# Patient Record
Sex: FEMALE | Race: WHITE | NOT HISPANIC OR LATINO | Employment: FULL TIME | URBAN - METROPOLITAN AREA
[De-identification: names, ages, dates, MRNs, and addresses within clinical notes are randomized per-mention and may not be internally consistent; named-entity substitution may affect disease eponyms.]

---

## 2017-06-10 ENCOUNTER — OFFICE VISIT (OUTPATIENT)
Dept: URGENT CARE | Facility: CLINIC | Age: 47
End: 2017-06-10
Payer: COMMERCIAL

## 2017-06-10 PROCEDURE — 99203 OFFICE O/P NEW LOW 30 MIN: CPT

## 2017-08-23 ENCOUNTER — ALLSCRIPTS OFFICE VISIT (OUTPATIENT)
Dept: OTHER | Facility: OTHER | Age: 47
End: 2017-08-23

## 2017-08-23 DIAGNOSIS — R63.5 ABNORMAL WEIGHT GAIN: ICD-10-CM

## 2017-08-23 DIAGNOSIS — E66.3 OVERWEIGHT(278.02): ICD-10-CM

## 2017-08-23 DIAGNOSIS — Z13.220 ENCOUNTER FOR SCREENING FOR LIPOID DISORDERS: ICD-10-CM

## 2018-01-12 NOTE — CONSULTS
Chief Complaint  Chief Complaint Free Text Note Form: New patient here with daughter for MWM consult; Gelacio Jimenes Poplin 1/8; patient reports of B/L ankle edema that radiates up into knees, states she's had multiple studies done with never any real sense of why the edema, now wonders if it could be a dietary issue  History of Present Illness  Free Text HPI: Obesity-  Severity: Mild  Onset: a few years ago and then slowly increased since then  Modifiers: Busy with her daughter  Has tried juicing, but was unable to sustain  Tried exercising for 2 years with a  with portion control, but had an abnormal mammogram and wasn't able to keep up with the lifestyle changes  Low carb/ketogenic diets with minimal results  1 cup decaf coffee and unsweetened ice tea, otherwise exclusively water  Associated Symptoms: self image, feels tired  Goals: improve health, feel more comfortable in her own skin, feel better in clothes, more energy  Past Medical History    1  History of Denial Of Any Significant Medical History   2  History of allergy (V15 09) (Z88 9)   3  History of contact dermatitis (V13 3) (Z87 2)   4  History of Skin rash (782 1) (R21)  Active Problems And Past Medical History Reviewed: The active problems and past medical history were reviewed and updated today  Assessment    1  Abnormal weight gain (783 1) (R63 5)   2  Overweight (BMI 25 0-29 9) (278 02) (E66 3)   3  Screening for hyperlipidemia (V77 91) (Z13 220)    Plan   1  (1) COMPREHENSIVE METABOLIC PANEL; Status:Active; Requested for:[id number];    2  (1) INSULIN, FASTING; Status:Active; Requested for:[id number];    3  (1) LIPID PANEL, FASTING; Status:Active; Requested for:[id number];     Discussion/Summary  Discussion Summary:   51 yo with w/ weight gain here to pursue medical weight management to improve weight and health      Overweight/Weight Gain:  -discussed options of conservative vs QI program +/- meal replacement  -discussed role of weight loss medications  -initial focus of 5-10% weight loss over 3-6 mos for improved health  -screening labs: Per patient were done about 6 months ago, but did not include LP  Was told by her PCP that labs were fine  LP, CMP, and fasting insulin ordered  Will request labs done by PCP  The patient is interested in VLCD or IL program  She will call when she is ready to schedule  Goals and Barriers: The patient has the current Goals: Goals:  1  Food log www  [employer] <[email or web address]>  2  No sugary beverages  At least 64oz of water daily  3  Increase physical activity by 10 minutes daily  Patient's Capacity to Self-Care: Patient is able to Self-Care  Understands and agrees with treatment plan: The treatment plan was reviewed with the patient/guardian  The patient/guardian understands and agrees with the treatment plan   Self Referrals:   Self Referrals: Yes Self Referred      Review of Systems  Focused-Female:   Constitutional: no fever and no chills  ENT: no sore throat  Cardiovascular: no chest pain    The patient presents with complaints of palpitations (infrequent)  Respiratory: no shortness of breath and no cough  Gastrointestinal: Denies GERD, but no abdominal pain, no nausea, no vomiting, no constipation and no diarrhea  Genitourinary: no dysuria  Musculoskeletal: no arthralgias  Integumentary: no rashes  The patient presents with complaints of headache (sinus headaches)  Other Symptoms: PSYCH: denies sx of depression and anxiety  ROS Reviewed:   ROS reviewed  Active Problems    1  Acute sinusitis (461 9) (J01 90)   2  Seasonal allergies (477 9) (J30 2)    Surgical History    1  History of Incisional Breast Biopsy   2  History of Oral Surgery Tooth Extraction [place] Tooth  Surgical History Reviewed: The surgical history was reviewed and updated today  Family History  Mother    1   Family history of malignant neoplasm of breast (V16 3) (Z80 3)  Paternal Grandmother    2  Family history of myocardial infarction (V17 3) (Z82 49)  Maternal Aunt    3  Family history of malignant neoplasm of breast (V16 3) (Z80 3)  Family History    4  Family history of Cancer  Family History Reviewed: The family history was reviewed and updated today  Social History    · Never A Smoker  Social History Reviewed: The social history was reviewed and updated today  Current Meds   1  Allegra Allergy 180 MG Oral Tablet; Therapy: (Lily Paige) to Recorded  Medication List Reviewed: The medication list was reviewed and updated today  Allergies    1  Sulfa Drugs    Vitals  Vital Signs    Recorded: 23Aug2017 01:08PM Recorded: 23Aug2017 12:49PM   Temperature 98 4 F 99 F   Heart Rate  72   Respiration  16   Systolic  606   Diastolic  82   Height  5 ft 3 5 in   Weight  164 lb 9 6 oz   BMI Calculated  28 7   BSA Calculated  1 79   Waist Circumference  34   Neck Circumference  13     Physical Exam    Constitutional   General appearance: Abnormal   well developed and overweight  Pulmonary   Respiratory effort: No increased work of breathing or signs of respiratory distress  Auscultation of lungs: Clear to auscultation  Cardiovascular   Auscultation of heart: Normal rate and rhythm, normal S1 and S2, without murmurs  Examination of extremities for edema and/or varicosities: Abnormal   bilateral ankle trace+ pitting edema  Abdomen   Abdomen: Non-tender, no masses  Musculoskeletal   Gait and station: Normal     Skin normal to inspection overall  Psychiatric tearful when discussing weight, otherwise affect was appropriate          Results/Data  STOP BANG Questionnaire [id number] 12:58PM User, Guilherme     Test Name Result Flag Reference   STOP BANG Questionnaire Risk of KIMMIE Low Risk     Snoring: No  Tired: Yes  Observed: No  Blood Pressure: No  BMI: No  Age: No  Neck Circumference: No  Gender: No   STOP BANG Questionnaire KIMMIE Total Score 1 Snoring: No  Tired: Yes  Observed: No  Blood Pressure: No  BMI: No  Age: No  Neck Circumference: No  Gender: No       Signatures   Electronically signed by : RAO Kwok;  Aug 23 2017  1:45PM EST                       (Author)    Electronically signed by : JIMMY Dorman ; Aug 23 2017  2:15PM EST                       (Co-author)

## 2018-01-14 VITALS
BODY MASS INDEX: 28.1 KG/M2 | HEART RATE: 72 BPM | DIASTOLIC BLOOD PRESSURE: 82 MMHG | WEIGHT: 164.6 LBS | RESPIRATION RATE: 16 BRPM | TEMPERATURE: 98.4 F | HEIGHT: 64 IN | SYSTOLIC BLOOD PRESSURE: 124 MMHG

## 2018-04-26 ENCOUNTER — OFFICE VISIT (OUTPATIENT)
Dept: URGENT CARE | Facility: CLINIC | Age: 48
End: 2018-04-26
Payer: COMMERCIAL

## 2018-04-26 VITALS
DIASTOLIC BLOOD PRESSURE: 82 MMHG | HEIGHT: 63 IN | WEIGHT: 150 LBS | OXYGEN SATURATION: 99 % | RESPIRATION RATE: 16 BRPM | BODY MASS INDEX: 26.58 KG/M2 | TEMPERATURE: 99.1 F | HEART RATE: 72 BPM | SYSTOLIC BLOOD PRESSURE: 132 MMHG

## 2018-04-26 DIAGNOSIS — J30.89 SEASONAL ALLERGIC RHINITIS DUE TO OTHER ALLERGIC TRIGGER: ICD-10-CM

## 2018-04-26 DIAGNOSIS — H10.33 ACUTE BACTERIAL CONJUNCTIVITIS OF BOTH EYES: Primary | ICD-10-CM

## 2018-04-26 PROCEDURE — 99213 OFFICE O/P EST LOW 20 MIN: CPT | Performed by: PHYSICIAN ASSISTANT

## 2018-04-26 RX ORDER — TOBRAMYCIN 3 MG/ML
2 SOLUTION/ DROPS OPHTHALMIC
Qty: 1 BOTTLE | Refills: 0 | Status: SHIPPED | OUTPATIENT
Start: 2018-04-26 | End: 2018-05-01

## 2018-04-26 NOTE — PATIENT INSTRUCTIONS
Wash hands frequently to prevent the spread of infection  Allergy medication as needed  Use eye drops as directed

## 2018-04-26 NOTE — PROGRESS NOTES
[facility]    NAME: Andrae Elder is a 50 y o  female  : 1970    MRN: [id number]  DATE: 2018  TIME: 12:35 PM    Assessment and Plan   Acute bacterial conjunctivitis of both eyes [H10 33]  1  Acute bacterial conjunctivitis of both eyes  tobramycin (TOBREX) 0 3 % SOLN   2  Seasonal allergic rhinitis due to other allergic trigger         Patient Instructions     Patient Instructions   Wash hands frequently to prevent the spread of infection  Allergy medication as needed  Use eye drops as directed  Chief Complaint     Chief Complaint   Patient presents with    Eye Pain     C/O redness and puralent drainage from right eye this AM  Pt denies any pain in the eye  Pt does wear contacts  History of Present Illness   24-year-old female here with complaint of right eye irritation, purulent drainage and redness that started today  Patient notices symptoms when she 1st woke up  Denies any pain in her eye  No vision changes  She has had some allergy symptoms the last week will with some nasal congestion, slight sore throat  Otherwise denies any other upper respiratory complaints  Review of Systems   Review of Systems   Constitutional: Negative for activity change, appetite change, chills, diaphoresis, fatigue, fever and unexpected weight change  HENT: Positive for congestion and sore throat  Negative for dental problem, hearing loss, sinus pressure, sneezing, tinnitus, trouble swallowing and voice change  Eyes: Positive for discharge and redness  Negative for photophobia and visual disturbance  Respiratory: Negative for apnea, cough, chest tightness, shortness of breath, wheezing and stridor  Cardiovascular: Negative for chest pain, palpitations and leg swelling  Gastrointestinal: Negative for abdominal distention, abdominal pain, blood in stool, constipation, diarrhea, nausea and vomiting     Endocrine: Negative for cold intolerance, heat intolerance, polydipsia, polyphagia and polyuria  Genitourinary: Negative for difficulty urinating, dysuria, flank pain, frequency, hematuria and urgency  Musculoskeletal: Negative for arthralgias, back pain, gait problem, joint swelling, myalgias, neck pain and neck stiffness  Skin: Negative for pallor, rash and wound  Neurological: Negative for dizziness, tremors, seizures, speech difficulty, weakness and headaches  Hematological: Negative for adenopathy  Does not bruise/bleed easily  Psychiatric/Behavioral: Negative for agitation, confusion, dysphoric mood and sleep disturbance  The patient is not nervous/anxious  All other systems reviewed and are negative  Current Medications     Current Outpatient Prescriptions:     tobramycin (TOBREX) 0 3 % SOLN, Administer 2 drops to both eyes every 4 (four) hours while awake for 5 days, Disp: 1 Bottle, Rfl: 0    Current Allergies     Allergies as of 04/26/2018 - Reviewed 04/26/2018   Allergen Reaction Noted    Sulfa antibiotics Rash 12/05/2013          The following portions of the patient's history were reviewed and updated as appropriate: allergies, current medications, past family history, past medical history, past social history, past surgical history and problem list    Past Medical History:   Diagnosis Date    Allergic rhinitis      Past Surgical History:   Procedure Laterality Date    BREAST BIOPSY       No family history on file  Medications have been verified  Objective   /82   Pulse 72   Temp 99 1 °F (37 3 °C) (Tympanic)   Resp 16   Ht 5' 3" (1 6 m)   Wt 68 kg (150 lb)   SpO2 99%   BMI 26 57 kg/m²      Physical Exam   Physical Exam   Constitutional: She appears well-developed and well-nourished  No distress  HENT:   Head: Normocephalic  Right Ear: Tympanic membrane and external ear normal    Left Ear: Tympanic membrane and external ear normal    Nose: Mucosal edema present   Right sinus exhibits no maxillary sinus tenderness and no frontal sinus tenderness  Left sinus exhibits no maxillary sinus tenderness and no frontal sinus tenderness  Mouth/Throat: Posterior oropharyngeal erythema (Mild) present  No oropharyngeal exudate  Eyes: Right eye exhibits discharge  Left eye exhibits no discharge  Right conjunctiva is injected  Left conjunctiva is injected  Neck: Normal range of motion  Neck supple  Cardiovascular: Normal rate, regular rhythm and normal heart sounds  No murmur heard  Pulmonary/Chest: Effort normal and breath sounds normal  No respiratory distress  She has no wheezes  She has no rales  Abdominal: Soft  Bowel sounds are normal  There is no tenderness  Musculoskeletal: Normal range of motion  Lymphadenopathy:     She has no cervical adenopathy  Skin: Skin is warm  No rash noted

## 2018-09-14 ENCOUNTER — APPOINTMENT (OUTPATIENT)
Dept: LAB | Facility: CLINIC | Age: 48
End: 2018-09-14
Payer: COMMERCIAL

## 2018-09-14 ENCOUNTER — TRANSCRIBE ORDERS (OUTPATIENT)
Dept: LAB | Facility: CLINIC | Age: 48
End: 2018-09-14

## 2018-09-14 DIAGNOSIS — R53.83 FATIGUE, UNSPECIFIED TYPE: Primary | ICD-10-CM

## 2018-09-14 DIAGNOSIS — R60.9 EDEMA, UNSPECIFIED TYPE: ICD-10-CM

## 2018-09-14 LAB
25(OH)D3 SERPL-MCNC: 18.5 NG/ML (ref 30–100)
ALBUMIN SERPL BCP-MCNC: 3.6 G/DL (ref 3.5–5)
ALP SERPL-CCNC: 66 U/L (ref 46–116)
ALT SERPL W P-5'-P-CCNC: 18 U/L (ref 12–78)
ANION GAP SERPL CALCULATED.3IONS-SCNC: 7 MMOL/L (ref 4–13)
AST SERPL W P-5'-P-CCNC: 13 U/L (ref 5–45)
BACTERIA UR QL AUTO: ABNORMAL /HPF
BASOPHILS # BLD AUTO: 0.06 THOUSANDS/ΜL (ref 0–0.1)
BASOPHILS NFR BLD AUTO: 1 % (ref 0–1)
BILIRUB SERPL-MCNC: 0.62 MG/DL (ref 0.2–1)
BILIRUB UR QL STRIP: NEGATIVE
BUN SERPL-MCNC: 12 MG/DL (ref 5–25)
CALCIUM SERPL-MCNC: 8.6 MG/DL (ref 8.3–10.1)
CHLORIDE SERPL-SCNC: 108 MMOL/L (ref 100–108)
CHLORIDE UR-SCNC: 198 MMOL/L (ref 10–330)
CHOLEST SERPL-MCNC: 207 MG/DL (ref 50–200)
CLARITY UR: CLEAR
CO2 SERPL-SCNC: 24 MMOL/L (ref 21–32)
COLOR UR: YELLOW
CREAT SERPL-MCNC: 0.94 MG/DL (ref 0.6–1.3)
EOSINOPHIL # BLD AUTO: 0.1 THOUSAND/ΜL (ref 0–0.61)
EOSINOPHIL NFR BLD AUTO: 2 % (ref 0–6)
ERYTHROCYTE [DISTWIDTH] IN BLOOD BY AUTOMATED COUNT: 13.7 % (ref 11.6–15.1)
GFR SERPL CREATININE-BSD FRML MDRD: 71 ML/MIN/1.73SQ M
GLUCOSE P FAST SERPL-MCNC: 89 MG/DL (ref 65–99)
GLUCOSE UR STRIP-MCNC: NEGATIVE MG/DL
HCT VFR BLD AUTO: 46 % (ref 34.8–46.1)
HDLC SERPL-MCNC: 63 MG/DL (ref 40–60)
HGB BLD-MCNC: 14.8 G/DL (ref 11.5–15.4)
HGB UR QL STRIP.AUTO: ABNORMAL
HYALINE CASTS #/AREA URNS LPF: ABNORMAL /LPF
IMM GRANULOCYTES # BLD AUTO: 0.01 THOUSAND/UL (ref 0–0.2)
IMM GRANULOCYTES NFR BLD AUTO: 0 % (ref 0–2)
KETONES UR STRIP-MCNC: NEGATIVE MG/DL
LDLC SERPL CALC-MCNC: 124 MG/DL (ref 0–100)
LEUKOCYTE ESTERASE UR QL STRIP: NEGATIVE
LYMPHOCYTES # BLD AUTO: 1.44 THOUSANDS/ΜL (ref 0.6–4.47)
LYMPHOCYTES NFR BLD AUTO: 24 % (ref 14–44)
MCH RBC QN AUTO: 27.8 PG (ref 26.8–34.3)
MCHC RBC AUTO-ENTMCNC: 32.2 G/DL (ref 31.4–37.4)
MCV RBC AUTO: 86 FL (ref 82–98)
MONOCYTES # BLD AUTO: 0.44 THOUSAND/ΜL (ref 0.17–1.22)
MONOCYTES NFR BLD AUTO: 7 % (ref 4–12)
NEUTROPHILS # BLD AUTO: 3.98 THOUSANDS/ΜL (ref 1.85–7.62)
NEUTS SEG NFR BLD AUTO: 66 % (ref 43–75)
NITRITE UR QL STRIP: NEGATIVE
NON-SQ EPI CELLS URNS QL MICRO: ABNORMAL /HPF
NONHDLC SERPL-MCNC: 144 MG/DL
NRBC BLD AUTO-RTO: 0 /100 WBCS
PH UR STRIP.AUTO: 6 [PH] (ref 4.5–8)
PLATELET # BLD AUTO: 298 THOUSANDS/UL (ref 149–390)
PMV BLD AUTO: 10.3 FL (ref 8.9–12.7)
POTASSIUM SERPL-SCNC: 4.4 MMOL/L (ref 3.5–5.3)
POTASSIUM UR-SCNC: 99.8 MMOL/L (ref 1–300)
PROT SERPL-MCNC: 7.5 G/DL (ref 6.4–8.2)
PROT UR STRIP-MCNC: ABNORMAL MG/DL
RBC # BLD AUTO: 5.33 MILLION/UL (ref 3.81–5.12)
RBC #/AREA URNS AUTO: ABNORMAL /HPF
SODIUM 24H UR-SCNC: 109 MOL/L
SODIUM SERPL-SCNC: 139 MMOL/L (ref 136–145)
SP GR UR STRIP.AUTO: 1.02 (ref 1–1.03)
T4 FREE SERPL-MCNC: 1.11 NG/DL (ref 0.76–1.46)
TRIGL SERPL-MCNC: 99 MG/DL
TSH SERPL DL<=0.05 MIU/L-ACNC: 2.46 UIU/ML (ref 0.36–3.74)
UROBILINOGEN UR QL STRIP.AUTO: 0.2 E.U./DL
WBC # BLD AUTO: 6.03 THOUSAND/UL (ref 4.31–10.16)
WBC #/AREA URNS AUTO: ABNORMAL /HPF

## 2018-09-14 PROCEDURE — 82436 ASSAY OF URINE CHLORIDE: CPT

## 2018-09-14 PROCEDURE — 84133 ASSAY OF URINE POTASSIUM: CPT

## 2018-09-14 PROCEDURE — 36415 COLL VENOUS BLD VENIPUNCTURE: CPT

## 2018-09-14 PROCEDURE — 80061 LIPID PANEL: CPT

## 2018-09-14 PROCEDURE — 82306 VITAMIN D 25 HYDROXY: CPT

## 2018-09-14 PROCEDURE — 84300 ASSAY OF URINE SODIUM: CPT

## 2018-09-14 PROCEDURE — 81001 URINALYSIS AUTO W/SCOPE: CPT

## 2018-09-14 PROCEDURE — 84443 ASSAY THYROID STIM HORMONE: CPT

## 2018-09-14 PROCEDURE — 80053 COMPREHEN METABOLIC PANEL: CPT

## 2018-09-14 PROCEDURE — 84439 ASSAY OF FREE THYROXINE: CPT

## 2018-09-14 PROCEDURE — 85025 COMPLETE CBC W/AUTO DIFF WBC: CPT

## 2018-09-24 ENCOUNTER — APPOINTMENT (OUTPATIENT)
Dept: LAB | Facility: CLINIC | Age: 48
End: 2018-09-24
Payer: COMMERCIAL

## 2018-09-24 ENCOUNTER — TRANSCRIBE ORDERS (OUTPATIENT)
Dept: URGENT CARE | Facility: CLINIC | Age: 48
End: 2018-09-24

## 2018-09-24 DIAGNOSIS — N39.0 URINARY TRACT INFECTION WITHOUT HEMATURIA, SITE UNSPECIFIED: Primary | ICD-10-CM

## 2018-09-24 LAB
AMORPH URATE CRY URNS QL MICRO: ABNORMAL /HPF
BACTERIA UR QL AUTO: ABNORMAL /HPF
BILIRUB UR QL STRIP: NEGATIVE
CLARITY UR: ABNORMAL
COLOR UR: ABNORMAL
GLUCOSE UR STRIP-MCNC: NEGATIVE MG/DL
HGB UR QL STRIP.AUTO: ABNORMAL
KETONES UR STRIP-MCNC: NEGATIVE MG/DL
LEUKOCYTE ESTERASE UR QL STRIP: NEGATIVE
NITRITE UR QL STRIP: NEGATIVE
NON-SQ EPI CELLS URNS QL MICRO: ABNORMAL /HPF
PH UR STRIP.AUTO: 6 [PH] (ref 4.5–8)
PROT UR STRIP-MCNC: ABNORMAL MG/DL
RBC #/AREA URNS AUTO: ABNORMAL /HPF
SP GR UR STRIP.AUTO: 1.03 (ref 1–1.03)
UROBILINOGEN UR QL STRIP.AUTO: 1 E.U./DL
WBC #/AREA URNS AUTO: ABNORMAL /HPF

## 2018-09-24 PROCEDURE — 81001 URINALYSIS AUTO W/SCOPE: CPT

## 2018-09-28 ENCOUNTER — TRANSCRIBE ORDERS (OUTPATIENT)
Dept: URGENT CARE | Facility: CLINIC | Age: 48
End: 2018-09-28

## 2018-09-28 ENCOUNTER — APPOINTMENT (OUTPATIENT)
Dept: LAB | Facility: CLINIC | Age: 48
End: 2018-09-28
Payer: COMMERCIAL

## 2018-09-28 DIAGNOSIS — R80.9 PROTEINURIA, UNSPECIFIED TYPE: Primary | ICD-10-CM

## 2018-09-28 DIAGNOSIS — R31.29 MICROSCOPIC HEMATURIA: ICD-10-CM

## 2018-09-28 LAB
CRP SERPL QL: <3 MG/L
ERYTHROCYTE [SEDIMENTATION RATE] IN BLOOD: 2 MM/HOUR (ref 0–20)
IGA SERPL-MCNC: 237 MG/DL (ref 70–400)
IGG SERPL-MCNC: 1100 MG/DL (ref 700–1600)
IGM SERPL-MCNC: 129 MG/DL (ref 40–230)

## 2018-09-28 PROCEDURE — 85652 RBC SED RATE AUTOMATED: CPT

## 2018-09-28 PROCEDURE — 84165 PROTEIN E-PHORESIS SERUM: CPT

## 2018-09-28 PROCEDURE — 84165 PROTEIN E-PHORESIS SERUM: CPT | Performed by: PATHOLOGY

## 2018-09-28 PROCEDURE — 86038 ANTINUCLEAR ANTIBODIES: CPT

## 2018-09-28 PROCEDURE — 86225 DNA ANTIBODY NATIVE: CPT

## 2018-09-28 PROCEDURE — 82784 ASSAY IGA/IGD/IGG/IGM EACH: CPT

## 2018-09-28 PROCEDURE — 86430 RHEUMATOID FACTOR TEST QUAL: CPT

## 2018-09-28 PROCEDURE — 84166 PROTEIN E-PHORESIS/URINE/CSF: CPT

## 2018-09-28 PROCEDURE — 36415 COLL VENOUS BLD VENIPUNCTURE: CPT

## 2018-09-28 PROCEDURE — 84166 PROTEIN E-PHORESIS/URINE/CSF: CPT | Performed by: PATHOLOGY

## 2018-09-28 PROCEDURE — 86140 C-REACTIVE PROTEIN: CPT

## 2018-09-29 LAB — DSDNA AB SER-ACNC: 1 IU/ML (ref 0–9)

## 2018-10-01 LAB
ALBUMIN SERPL ELPH-MCNC: 3.79 G/DL (ref 3.5–5)
ALBUMIN SERPL ELPH-MCNC: 56.5 % (ref 52–65)
ALBUMIN UR ELPH-MCNC: 68.9 %
ALPHA1 GLOB MFR UR ELPH: 4.5 %
ALPHA1 GLOB SERPL ELPH-MCNC: 0.27 G/DL (ref 0.1–0.4)
ALPHA1 GLOB SERPL ELPH-MCNC: 4.1 % (ref 2.5–5)
ALPHA2 GLOB MFR UR ELPH: 5.3 %
ALPHA2 GLOB SERPL ELPH-MCNC: 0.64 G/DL (ref 0.4–1.2)
ALPHA2 GLOB SERPL ELPH-MCNC: 9.5 % (ref 7–13)
B-GLOBULIN MFR UR ELPH: 7 %
BETA GLOB ABNORMAL SERPL ELPH-MCNC: 0.44 G/DL (ref 0.4–0.8)
BETA1 GLOB SERPL ELPH-MCNC: 6.6 % (ref 5–13)
BETA2 GLOB SERPL ELPH-MCNC: 5.2 % (ref 2–8)
BETA2+GAMMA GLOB SERPL ELPH-MCNC: 0.35 G/DL (ref 0.2–0.5)
GAMMA GLOB ABNORMAL SERPL ELPH-MCNC: 1.21 G/DL (ref 0.5–1.6)
GAMMA GLOB MFR UR ELPH: 14.3 %
GAMMA GLOB SERPL ELPH-MCNC: 18.1 % (ref 12–22)
IGG/ALB SER: 1.3 {RATIO} (ref 1.1–1.8)
PROT PATTERN SERPL ELPH-IMP: NORMAL
PROT PATTERN UR ELPH-IMP: ABNORMAL
PROT SERPL-MCNC: 6.7 G/DL (ref 6.4–8.2)
PROT UR-MCNC: 55 MG/DL
RHEUMATOID FACT SER QL LA: NEGATIVE
RYE IGE QN: NEGATIVE

## 2018-11-26 ENCOUNTER — TRANSCRIBE ORDERS (OUTPATIENT)
Dept: URGENT CARE | Facility: CLINIC | Age: 48
End: 2018-11-26

## 2018-11-26 ENCOUNTER — APPOINTMENT (OUTPATIENT)
Dept: LAB | Facility: CLINIC | Age: 48
End: 2018-11-26
Payer: COMMERCIAL

## 2018-11-26 DIAGNOSIS — R80.9 PROTEINURIA, UNSPECIFIED TYPE: Primary | ICD-10-CM

## 2018-11-26 PROCEDURE — 84156 ASSAY OF PROTEIN URINE: CPT

## 2018-11-27 LAB
PROT 24H UR-MCNC: 390 MG/24 HRS (ref 40–150)
SPECIMEN VOL UR: 1300 ML

## 2018-11-30 ENCOUNTER — TELEPHONE (OUTPATIENT)
Dept: INTERVENTIONAL RADIOLOGY/VASCULAR | Facility: HOSPITAL | Age: 48
End: 2018-11-30

## 2018-12-03 ENCOUNTER — TELEPHONE (OUTPATIENT)
Dept: INTERVENTIONAL RADIOLOGY/VASCULAR | Facility: HOSPITAL | Age: 48
End: 2018-12-03

## 2018-12-18 ENCOUNTER — TELEPHONE (OUTPATIENT)
Dept: SURGERY | Facility: HOSPITAL | Age: 48
End: 2018-12-18

## 2018-12-19 ENCOUNTER — HOSPITAL ENCOUNTER (OUTPATIENT)
Dept: INTERVENTIONAL RADIOLOGY/VASCULAR | Facility: HOSPITAL | Age: 48
Discharge: HOME | End: 2018-12-19
Attending: INTERNAL MEDICINE | Admitting: RADIOLOGY
Payer: COMMERCIAL

## 2018-12-19 VITALS
HEIGHT: 63 IN | OXYGEN SATURATION: 97 % | DIASTOLIC BLOOD PRESSURE: 88 MMHG | TEMPERATURE: 97.8 F | SYSTOLIC BLOOD PRESSURE: 136 MMHG | BODY MASS INDEX: 30.12 KG/M2 | RESPIRATION RATE: 18 BRPM | HEART RATE: 72 BPM | WEIGHT: 170 LBS

## 2018-12-19 DIAGNOSIS — N28.9 KIDNEY DISORDER: ICD-10-CM

## 2018-12-19 LAB — EXT PREGNANCY TEST URINE: NEGATIVE

## 2018-12-19 PROCEDURE — 77012 CT SCAN FOR NEEDLE BIOPSY: CPT | Performed by: RADIOLOGY

## 2018-12-19 PROCEDURE — 77012 CT SCAN FOR NEEDLE BIOPSY: CPT

## 2018-12-19 PROCEDURE — 50200 RENAL BIOPSY PERQ: CPT | Performed by: RADIOLOGY

## 2018-12-19 PROCEDURE — 81025 URINE PREGNANCY TEST: CPT | Performed by: RADIOLOGY

## 2018-12-19 PROCEDURE — 50200 RENAL BIOPSY PERQ: CPT

## 2018-12-19 RX ORDER — LABETALOL HYDROCHLORIDE 5 MG/ML
INJECTION, SOLUTION INTRAVENOUS CODE/TRAUMA/SEDATION MEDICATION
Status: COMPLETED | OUTPATIENT
Start: 2018-12-19 | End: 2018-12-19

## 2018-12-19 RX ORDER — FENTANYL CITRATE 50 UG/ML
INJECTION, SOLUTION INTRAMUSCULAR; INTRAVENOUS CODE/TRAUMA/SEDATION MEDICATION
Status: COMPLETED | OUTPATIENT
Start: 2018-12-19 | End: 2018-12-19

## 2018-12-19 RX ORDER — SODIUM CHLORIDE, SODIUM LACTATE, POTASSIUM CHLORIDE, CALCIUM CHLORIDE 600; 310; 30; 20 MG/100ML; MG/100ML; MG/100ML; MG/100ML
75 INJECTION, SOLUTION INTRAVENOUS CONTINUOUS
Status: DISCONTINUED | OUTPATIENT
Start: 2018-12-19 | End: 2018-12-23 | Stop reason: HOSPADM

## 2018-12-19 RX ORDER — LIDOCAINE HYDROCHLORIDE 10 MG/ML
INJECTION, SOLUTION INFILTRATION; PERINEURAL CODE/TRAUMA/SEDATION MEDICATION
Status: COMPLETED | OUTPATIENT
Start: 2018-12-19 | End: 2018-12-19

## 2018-12-19 RX ADMIN — SODIUM CHLORIDE, POTASSIUM CHLORIDE, SODIUM LACTATE AND CALCIUM CHLORIDE 75 ML/HR: 600; 310; 30; 20 INJECTION, SOLUTION INTRAVENOUS at 08:17

## 2018-12-19 RX ADMIN — SODIUM CHLORIDE, POTASSIUM CHLORIDE, SODIUM LACTATE AND CALCIUM CHLORIDE 75 ML/HR: 600; 310; 30; 20 INJECTION, SOLUTION INTRAVENOUS at 08:16

## 2018-12-19 RX ADMIN — FENTANYL CITRATE 25 MCG: 50 INJECTION INTRAMUSCULAR; INTRAVENOUS at 09:27

## 2018-12-19 RX ADMIN — LIDOCAINE HYDROCHLORIDE 8 ML: 10 INJECTION, SOLUTION INFILTRATION; PERINEURAL at 09:37

## 2018-12-19 RX ADMIN — LABETALOL 20 MG/4 ML (5 MG/ML) INTRAVENOUS SYRINGE 5 MG: at 09:56

## 2018-12-19 RX ADMIN — FENTANYL CITRATE 50 MCG: 50 INJECTION INTRAMUSCULAR; INTRAVENOUS at 09:44

## 2018-12-19 NOTE — PROGRESS NOTES
The history and physical were reviewed, along with progress notes, and the patient was examined  There are no changes since it was written      Pt w/ intermittent or paroxysmal hematuria and protienuria    Otherwise well    RRR, nl respiratory effort  Skin over flank intact bilaterally    Plan core Bx

## 2018-12-19 NOTE — DISCHARGE INSTRUCTIONS
[address]  DR Liza Siddiqui  [phone]    Percutaneous Kidney Biopsy   WHAT YOU NEED TO KNOW:   A percutaneous kidney biopsy is a procedure to remove a small sample of kidney tissue  It may also be done to check for kidney disease or cancer  DISCHARGE INSTRUCTIONS:   Follow up with your healthcare provider as directed:  Write down your questions so you remember to ask them during your visits  Wound care: The Band-Aid may be removed in 24 hours  For more information:   · National [employer] and [employer] Information Clearinghouse  [address] [place] [id number]  Phone: [phone]  Web Address: http://kidney  niddk nih gov/   Care after your procedure:    1  Limit your activities for 36 hours after your biopsy  2  No driving day of biopsy  3  Return to your normal diet  Flat sips of flat soda helps with mild nausea  4  Remove band-aid or dressing 24 hours after procedure  Contact Interventional Radiology at [phone]    Yaneth PATIENTS: Contact Interventional Radiology at [phone]) Mimi Espitia PATIENTS: Contact Interventional Radiology at [phone]) if:    1  Difficulty breathing, nausea or vomiting  2  You feel weak or dizzy  3  Chills or fever above 101 degrees F  You have persistent nausea or vomiting    4  You have severe pain in your abdomen or where You feel weak or dizzy  your           procedure was done  5  You have blood in your urine  You urinate small amounts or not at all  4  Develop any redness, swelling, heat, unusual drainage, heavy bruising or bleeding     from biopsy site

## 2018-12-26 ENCOUNTER — HOSPITAL ENCOUNTER (OUTPATIENT)
Dept: INTERVENTIONAL RADIOLOGY/VASCULAR | Facility: HOSPITAL | Age: 48
Discharge: HOME | End: 2018-12-26
Attending: RADIOLOGY

## 2018-12-26 ENCOUNTER — TELEPHONE (OUTPATIENT)
Dept: INTERVENTIONAL RADIOLOGY/VASCULAR | Facility: HOSPITAL | Age: 48
End: 2018-12-26

## 2018-12-26 DIAGNOSIS — N28.9 KIDNEY DISORDER: ICD-10-CM

## 2018-12-26 NOTE — PROGRESS NOTES
Pt c/o discomfort in left flank, starting on post Bx day #4  On US, I see no hematoma  She does not need analgesia  Will follow on Friday

## 2018-12-28 ENCOUNTER — TELEPHONE (OUTPATIENT)
Dept: INTERVENTIONAL RADIOLOGY/VASCULAR | Facility: HOSPITAL | Age: 48
End: 2018-12-28

## 2018-12-31 ENCOUNTER — HOSPITAL ENCOUNTER (OUTPATIENT)
Dept: CT IMAGING | Facility: HOSPITAL | Age: 48
Discharge: HOME | End: 2018-12-31
Attending: RADIOLOGY
Payer: COMMERCIAL

## 2018-12-31 DIAGNOSIS — R10.31 RIGHT LOWER QUADRANT ABDOMINAL PAIN: ICD-10-CM

## 2018-12-31 PROCEDURE — 74177 CT ABD & PELVIS W/CONTRAST: CPT

## 2018-12-31 RX ADMIN — IOHEXOL 80 ML: 350 INJECTION, SOLUTION INTRAVENOUS at 13:49

## 2019-01-03 NOTE — PROCEDURES
C/O pain starting several days after the procedure  No abnl on PE  Scanned with our [facility] [facility], and did not see any abnl      Will follow, and CT if Sx persist     RDR

## 2019-02-28 ENCOUNTER — TRANSCRIBE ORDERS (OUTPATIENT)
Dept: LAB | Facility: CLINIC | Age: 49
End: 2019-02-28

## 2019-02-28 ENCOUNTER — APPOINTMENT (OUTPATIENT)
Dept: LAB | Facility: CLINIC | Age: 49
End: 2019-02-28
Payer: COMMERCIAL

## 2019-02-28 DIAGNOSIS — Z79.899 LONG TERM USE OF DRUG: Primary | ICD-10-CM

## 2019-02-28 LAB
ALBUMIN SERPL BCP-MCNC: 4.1 G/DL (ref 3.5–5)
ALP SERPL-CCNC: 64 U/L (ref 46–116)
ALT SERPL W P-5'-P-CCNC: 18 U/L (ref 12–78)
AST SERPL W P-5'-P-CCNC: 13 U/L (ref 5–45)
BILIRUB DIRECT SERPL-MCNC: 0.12 MG/DL (ref 0–0.2)
BILIRUB SERPL-MCNC: 0.45 MG/DL (ref 0.2–1)
CHOLEST SERPL-MCNC: 151 MG/DL (ref 50–200)
HDLC SERPL-MCNC: 51 MG/DL (ref 40–60)
LDLC SERPL CALC-MCNC: 79 MG/DL (ref 0–100)
NONHDLC SERPL-MCNC: 100 MG/DL
PROT SERPL-MCNC: 7.9 G/DL (ref 6.4–8.2)
TRIGL SERPL-MCNC: 107 MG/DL

## 2019-02-28 PROCEDURE — 80076 HEPATIC FUNCTION PANEL: CPT

## 2019-02-28 PROCEDURE — 80061 LIPID PANEL: CPT

## 2019-02-28 PROCEDURE — 36415 COLL VENOUS BLD VENIPUNCTURE: CPT

## 2019-08-14 ENCOUNTER — OFFICE VISIT (OUTPATIENT)
Dept: URGENT CARE | Facility: CLINIC | Age: 49
End: 2019-08-14
Payer: COMMERCIAL

## 2019-08-14 VITALS
HEIGHT: 63 IN | DIASTOLIC BLOOD PRESSURE: 70 MMHG | RESPIRATION RATE: 18 BRPM | TEMPERATURE: 98 F | WEIGHT: 174 LBS | HEART RATE: 80 BPM | BODY MASS INDEX: 30.83 KG/M2 | SYSTOLIC BLOOD PRESSURE: 150 MMHG

## 2019-08-14 DIAGNOSIS — J01.90 ACUTE SINUSITIS, RECURRENCE NOT SPECIFIED, UNSPECIFIED LOCATION: Primary | ICD-10-CM

## 2019-08-14 PROCEDURE — 99213 OFFICE O/P EST LOW 20 MIN: CPT | Performed by: PHYSICIAN ASSISTANT

## 2019-08-14 RX ORDER — METHYLPREDNISOLONE 4 MG/1
TABLET ORAL
Qty: 1 EACH | Refills: 0 | Status: SHIPPED | OUTPATIENT
Start: 2019-08-14 | End: 2019-09-16

## 2019-08-14 RX ORDER — AMOXICILLIN AND CLAVULANATE POTASSIUM 875; 125 MG/1; MG/1
1 TABLET, FILM COATED ORAL EVERY 12 HOURS SCHEDULED
Qty: 14 TABLET | Refills: 0 | Status: SHIPPED | OUTPATIENT
Start: 2019-08-14 | End: 2019-08-21

## 2019-08-14 RX ORDER — SIMVASTATIN 40 MG
TABLET ORAL
COMMUNITY
Start: 2019-07-09 | End: 2021-12-06 | Stop reason: SDUPTHER

## 2019-08-14 RX ORDER — LISINOPRIL 10 MG/1
TABLET ORAL
COMMUNITY
Start: 2019-07-09 | End: 2021-12-06 | Stop reason: SDUPTHER

## 2019-08-14 NOTE — PROGRESS NOTES
[facility]        NAME: Tiff Lehman is a 44 y o  female  : 1970    MRN: [id number]  DATE: 2019  TIME: 9:35 AM    Assessment and Plan   Acute sinusitis, recurrence not specified, unspecified location [J01 90]  1  Acute sinusitis, recurrence not specified, unspecified location  methylPREDNISolone 4 MG tablet therapy pack    amoxicillin-clavulanate (AUGMENTIN) 875-125 mg per tablet         Patient Instructions     Recommend antibiotic therapy at this time and patient agrees  Also recommend supportive measures:   1  Push fluids and rest   2  OTC Tylenol/Motrin as needed for pain/fever   3  Flonase & Zyrtec daily  '  Follow up with PCP in 3-5 days  Proceed to  ER if symptoms worsen  Chief Complaint     Chief Complaint   Patient presents with    Headache     teeth pain, head congestion and scratchy throat  History of Present Illness       45 y/o F presents for eval of L sided pain and pressure onset 4 days ago with associated tooth pain, nasal congesiton, ear pressure  Pt has not taken anything OTC for this  She denies fever, chills, N/V/D, abd pain, chest pain, dyspnea  Review of Systems   Review of Systems   All other systems reviewed and are negative          Current Medications       Current Outpatient Medications:     lisinopril (ZESTRIL) 10 mg tablet, , Disp: , Rfl:     simvastatin (ZOCOR) 40 mg tablet, , Disp: , Rfl:     amoxicillin-clavulanate (AUGMENTIN) 875-125 mg per tablet, Take 1 tablet by mouth every 12 (twelve) hours for 7 days, Disp: 14 tablet, Rfl: 0    methylPREDNISolone 4 MG tablet therapy pack, Use as directed on package, Disp: 1 each, Rfl: 0    Current Allergies     Allergies as of 2019 - Reviewed 2019   Allergen Reaction Noted    Sulfasalazine Rash 2013            The following portions of the patient's history were reviewed and updated as appropriate: allergies, current medications, past family history, past medical history, past social history, past surgical history and problem list      Past Medical History:   Diagnosis Date    Allergic rhinitis        Past Surgical History:   Procedure Laterality Date    BREAST BIOPSY Left     l breast    IR IMAGE GUIDED BIOPSY/ASPIRATION KIDNEY  12/19/2018       No family history on file  Medications have been verified  Objective   /70   Pulse 80   Temp 98 °F (36 7 °C) (Tympanic)   Resp 18   Ht 5' 3" (1 6 m)   Wt 78 9 kg (174 lb)   LMP 07/27/2019   BMI 30 82 kg/m²        Physical Exam     Physical Exam   Constitutional: She is oriented to person, place, and time  She appears well-developed and well-nourished  No distress  HENT:   Head: Normocephalic and atraumatic  Right Ear: Hearing, tympanic membrane, external ear and ear canal normal    Left Ear: Hearing, tympanic membrane, external ear and ear canal normal    Nose: Left sinus exhibits maxillary sinus tenderness and frontal sinus tenderness  Mouth/Throat: Uvula is midline, oropharynx is clear and moist and mucous membranes are normal    Eyes: Pupils are equal, round, and reactive to light  Conjunctivae and EOM are normal    Cardiovascular: Normal rate and regular rhythm  Exam reveals no gallop and no friction rub  No murmur heard  Pulmonary/Chest: Effort normal and breath sounds normal  No respiratory distress  She has no wheezes  She has no rales  Neurological: She is alert and oriented to person, place, and time  Skin: Skin is warm and dry

## 2019-09-14 ENCOUNTER — OFFICE VISIT (OUTPATIENT)
Dept: URGENT CARE | Facility: HOSPITAL | Age: 49
End: 2019-09-14
Payer: COMMERCIAL

## 2019-09-14 VITALS
HEIGHT: 63 IN | TEMPERATURE: 98.3 F | OXYGEN SATURATION: 97 % | RESPIRATION RATE: 18 BRPM | DIASTOLIC BLOOD PRESSURE: 76 MMHG | SYSTOLIC BLOOD PRESSURE: 145 MMHG | BODY MASS INDEX: 30.79 KG/M2 | WEIGHT: 173.8 LBS | HEART RATE: 72 BPM

## 2019-09-14 DIAGNOSIS — M79.661 PAIN OF RIGHT CALF: Primary | ICD-10-CM

## 2019-09-14 PROCEDURE — 99213 OFFICE O/P EST LOW 20 MIN: CPT | Performed by: FAMILY MEDICINE

## 2019-09-14 RX ORDER — ASPIRIN 81 MG/1
81 TABLET, CHEWABLE ORAL DAILY
COMMUNITY
End: 2021-02-05

## 2019-09-14 RX ORDER — TAMOXIFEN CITRATE 20 MG/1
20 TABLET ORAL DAILY
COMMUNITY
End: 2021-02-05

## 2019-09-14 NOTE — PROGRESS NOTES
[facility]        NAME: Theron Kehr is a 44 y o  female  : 1970    MRN: [id number]  DATE: 2019  TIME: 1:30 PM    Assessment and Plan   Pain of right calf [M79 661]  1  Pain of right calf           Patient Instructions     Today there is no redness, warmth, or an increased size of the right calf, compared to the left  With the pain improved today over yesterday, I am fairly confident that this is nothing to do with a blood clot  However should the symptoms persist, or worsen, go to the emergency room for full evaluation  Follow up with PCP in 3-5 days  Proceed to  ER if symptoms worsen  Chief Complaint     Chief Complaint   Patient presents with    Leg Pain     Patient c/o right leg pain, underwent double masectomy , denies SOB         History of Present Illness       Leg Pain (Patient c/o right leg pain, underwent double masectomy , denies SOB)  Symptoms improved today over yesterday  Leg Pain          Review of Systems   Review of Systems   Constitutional: Negative  Respiratory: Negative  Cardiovascular: Negative      Musculoskeletal:        Right calf pain         Current Medications       Current Outpatient Medications:     aspirin 81 mg chewable tablet, Chew 81 mg daily, Disp: , Rfl:     lisinopril (ZESTRIL) 10 mg tablet, , Disp: , Rfl:     simvastatin (ZOCOR) 40 mg tablet, , Disp: , Rfl:     tamoxifen (NOLVADEX) 20 mg tablet, Take 20 mg by mouth daily, Disp: , Rfl:     methylPREDNISolone 4 MG tablet therapy pack, Use as directed on package (Patient not taking: Reported on 2019), Disp: 1 each, Rfl: 0    Current Allergies     Allergies as of 2019 - Reviewed 2019   Allergen Reaction Noted    Bactrim [sulfamethoxazole-trimethoprim]  2019    Sulfasalazine Rash 2013            The following portions of the patient's history were reviewed and updated as appropriate: allergies, current medications, past family history, past medical history, past social history, past surgical history and problem list      Past Medical History:   Diagnosis Date    Allergic rhinitis     Breast CA ([address] )     IgA nephropathy        Past Surgical History:   Procedure Laterality Date    BREAST BIOPSY Left     l breast    IR IMAGE GUIDED BIOPSY/ASPIRATION KIDNEY  12/19/2018    MASTECTOMY Bilateral 07/25/2019       No family history on file  Medications have been verified  Objective   /76   Pulse 72   Temp 98 3 °F (36 8 °C) (Tympanic)   Resp 18   Ht 5' 3" (1 6 m)   Wt 78 8 kg (173 lb 12 8 oz)   SpO2 97%   BMI 30 79 kg/m²        Physical Exam     Physical Exam   Constitutional: She appears well-developed and well-nourished  Cardiovascular: Normal rate and regular rhythm  Pulmonary/Chest: Effort normal and breath sounds normal    Musculoskeletal:        Right lower leg: She exhibits tenderness  She exhibits no swelling and no edema          Legs:

## 2019-09-14 NOTE — PATIENT INSTRUCTIONS
Today there is no redness, warmth, or an increased size of the right calf, compared to the left  With the pain improved today over yesterday, I am fairly confident that this is nothing to do with a blood clot  However should the symptoms persist, or worsen, go to the emergency room for full evaluation  Follow up with PCP in 3-5 days  Proceed to  ER if symptoms worsen  Deep Vein Thrombosis Prevention   WHAT YOU NEED TO KNOW:   Deep vein thrombosis (DVT) is a blood clot that forms in a deep vein of the body  The deep veins in the legs, thighs, and hips are the most common sites for DVT  DVT can also occur in your arms  The clot prevents the normal flow of blood in the vein  The blood backs up and causes pain and swelling  The DVT can break into smaller pieces and travel to your lungs and cause a blockage called a pulmonary embolism  A pulmonary embolism can become life-threatening  DISCHARGE INSTRUCTIONS:   Call 911 for any of the following:   · You feel lightheaded, short of breath, and have chest pain  · You cough up blood  Return to the emergency department if:   · Your arm or leg feels warm, tender, and painful  It may look swollen and red  Contact your healthcare provider if:   · You have questions or concerns about your condition or care  Risk factors for DVT:  A DVT can happen to anybody, but certain things can increase your risk  You may be at higher risk if you have had DVT in the past  You may also be at risk if you have a family member who has had blood clots   The following conditions also increase your risk:  · Limited activity caused by bed rest, a leg cast, or sitting for long periods    · Injury to a deep vein, or surgery    · A blood disorder that makes your blood clot faster than normal, such as factor V Leiden mutation    · Age older than 60 years    · Use of hormone replacement therapy or some types of birth control medicine    · Pregnancy, and for 6 weeks after childbirth · Cancer or heart failure     · A catheter placed in a large vein    · Smoking    · Obesity or varicose veins  Prevent DVT:   · Guidelines for everyone:      ¨ Maintain a healthy weight  Ask your healthcare provider how much you should weigh  Ask him to help you create a weight loss plan if you are overweight  ¨ Do not smoke  Nicotine and other chemicals in cigarettes and cigars can damage blood vessels and increase your risk for a DVT  Ask your healthcare provider for information if you currently smoke and need help to quit  E-cigarettes or smokeless tobacco still contain nicotine  Talk to your healthcare provider before you use these products  ¨ Move regularly if you sit for long periods of time  If you travel by car or work at a desk, move and stretch in your seat several times each hour  In an airplane, get up and walk every hour  Exercise your legs while sitting by raising and lowering your heels  Keep your toes on the floor while you do this  You can also raise and lower your toes while keeping your heels on the floor  Also tighten and release your leg muscles while sitting  ¨ Exercise regularly  to help increase your blood flow  Walking is a good low-impact exercise  Talk to your healthcare provider about the best exercise plan for you  · Guidelines for people at high risk for DVT:      ¨ Take blood thinner medicines as directed  Your healthcare provider may recommend blood thinners and other medicines to help prevent blood clots  ¨ Wear pressure stockings as directed  The stockings are tight and put pressure on your legs  This improves blood flow and helps prevent clots  Wear the stockings during the day  Do not wear them when you sleep  ¨ Elevate your legs  above the level of your heart as often as you can  This will help decrease swelling and pain  Prop your legs on pillows or blankets to keep them elevated comfortably       ¨ Get up and move as directed after surgery or an injury, or during an illness  Early and regular movement can help decrease your risk for DVT by helping to increase your blood flow  Ask your healthcare provider what type of activity you need and how often you should do it  ¨ Change body positions often if you are bedridden  Ask for help to change your position every 1 to 2 hours  Follow up with your healthcare provider as directed:  Write down your questions so you remember to ask them during your visits  © 2017 [facility] Information is for End User's use only and may not be sold, redistributed or otherwise used for commercial purposes  All illustrations and images included in CareNotes® are the copyrighted property of A D A M [employer] Inc  or Alireza Carrillo  The above information is an  only  It is not intended as medical advice for individual conditions or treatments  Talk to your doctor, nurse or pharmacist before following any medical regimen to see if it is safe and effective for you

## 2019-09-16 ENCOUNTER — HOSPITAL ENCOUNTER (EMERGENCY)
Facility: HOSPITAL | Age: 49
Discharge: HOME | End: 2019-09-16
Payer: COMMERCIAL

## 2019-09-16 ENCOUNTER — APPOINTMENT (EMERGENCY)
Dept: NON INVASIVE DIAGNOSTICS | Facility: HOSPITAL | Age: 49
End: 2019-09-16
Payer: COMMERCIAL

## 2019-09-16 VITALS
RESPIRATION RATE: 18 BRPM | DIASTOLIC BLOOD PRESSURE: 76 MMHG | TEMPERATURE: 98.4 F | HEIGHT: 63 IN | OXYGEN SATURATION: 98 % | BODY MASS INDEX: 30.48 KG/M2 | WEIGHT: 172 LBS | HEART RATE: 74 BPM | SYSTOLIC BLOOD PRESSURE: 138 MMHG

## 2019-09-16 DIAGNOSIS — M79.604 RIGHT LEG PAIN: Primary | ICD-10-CM

## 2019-09-16 LAB
ALBUMIN SERPL BCP-MCNC: 4 G/DL (ref 3.5–5.7)
ALP SERPL-CCNC: 47 U/L (ref 40–150)
ALT SERPL W P-5'-P-CCNC: 6 U/L (ref 7–52)
ANION GAP SERPL CALCULATED.3IONS-SCNC: 6 MMOL/L (ref 4–13)
APTT PPP: 32 SECONDS (ref 23–37)
AST SERPL W P-5'-P-CCNC: 13 U/L (ref 13–39)
BASOPHILS # BLD AUTO: 0.1 THOUSANDS/ΜL (ref 0–0.1)
BASOPHILS NFR BLD AUTO: 1 % (ref 0–2)
BILIRUB SERPL-MCNC: 0.5 MG/DL (ref 0.2–1)
BUN SERPL-MCNC: 12 MG/DL (ref 7–25)
CALCIUM SERPL-MCNC: 9.1 MG/DL (ref 8.6–10.5)
CHLORIDE SERPL-SCNC: 107 MMOL/L (ref 98–107)
CO2 SERPL-SCNC: 26 MMOL/L (ref 21–31)
CREAT SERPL-MCNC: 1.05 MG/DL (ref 0.6–1.2)
EOSINOPHIL # BLD AUTO: 0.2 THOUSAND/ΜL (ref 0–0.61)
EOSINOPHIL NFR BLD AUTO: 2 % (ref 0–5)
ERYTHROCYTE [DISTWIDTH] IN BLOOD BY AUTOMATED COUNT: 13.6 % (ref 11.5–14.5)
GFR SERPL CREATININE-BSD FRML MDRD: 62 ML/MIN/1.73SQ M
GLUCOSE SERPL-MCNC: 91 MG/DL (ref 65–99)
HCT VFR BLD AUTO: 41.1 % (ref 42–47)
HGB BLD-MCNC: 13.5 G/DL (ref 12–16)
INR PPP: 1.03 (ref 0.9–1.5)
LYMPHOCYTES # BLD AUTO: 1.7 THOUSANDS/ΜL (ref 0.6–4.47)
LYMPHOCYTES NFR BLD AUTO: 19 % (ref 21–51)
MCH RBC QN AUTO: 27.6 PG (ref 26–34)
MCHC RBC AUTO-ENTMCNC: 32.9 G/DL (ref 31–37)
MCV RBC AUTO: 84 FL (ref 81–99)
MONOCYTES # BLD AUTO: 0.6 THOUSAND/ΜL (ref 0.17–1.22)
MONOCYTES NFR BLD AUTO: 6 % (ref 2–12)
NEUTROPHILS # BLD AUTO: 6.6 THOUSANDS/ΜL (ref 1.4–6.5)
NEUTS SEG NFR BLD AUTO: 72 % (ref 42–75)
PLATELET # BLD AUTO: 244 THOUSANDS/UL (ref 149–390)
PMV BLD AUTO: 7.6 FL (ref 8.6–11.7)
POTASSIUM SERPL-SCNC: 4.4 MMOL/L (ref 3.5–5.5)
PROT SERPL-MCNC: 7.1 G/DL (ref 6.4–8.9)
PROTHROMBIN TIME: 11.9 SECONDS (ref 10.2–13)
RBC # BLD AUTO: 4.9 MILLION/UL (ref 3.9–5.2)
SODIUM SERPL-SCNC: 139 MMOL/L (ref 134–143)
WBC # BLD AUTO: 9.1 THOUSAND/UL (ref 4.8–10.8)

## 2019-09-16 PROCEDURE — 93971 EXTREMITY STUDY: CPT

## 2019-09-16 PROCEDURE — 93971 EXTREMITY STUDY: CPT | Performed by: SURGERY

## 2019-09-16 PROCEDURE — 85730 THROMBOPLASTIN TIME PARTIAL: CPT

## 2019-09-16 PROCEDURE — 99284 EMERGENCY DEPT VISIT MOD MDM: CPT

## 2019-09-16 PROCEDURE — 85610 PROTHROMBIN TIME: CPT

## 2019-09-16 PROCEDURE — 80053 COMPREHEN METABOLIC PANEL: CPT

## 2019-09-16 PROCEDURE — 36415 COLL VENOUS BLD VENIPUNCTURE: CPT

## 2019-09-16 PROCEDURE — 85025 COMPLETE CBC W/AUTO DIFF WBC: CPT

## 2019-09-16 NOTE — ED PROVIDER NOTES
History  Chief Complaint   Patient presents with    Leg Pain     right leg pain     Hi Vergara is a 56-year-old female who came to the emergency department due to right leg pain with started few days prior to arrival   Patient denies injury to the right lower extremity  Patient denies smoking  She denies chest pain or shortness of breath  History provided by:  Patient   used: No    Leg Pain   Location:  Leg  Time since incident: Few  Injury: no    Leg location:  R lower leg  Pain details:     Quality:  Aching    Radiates to:  Does not radiate    Severity:  Mild    Onset quality:  Gradual    Duration: Few  Timing:  Constant    Progression:  Unchanged  Chronicity:  New  Dislocation: no    Foreign body present:  No foreign bodies  Tetanus status:  Unknown  Prior injury to area:  No  Relieved by:  Nothing  Worsened by:  Nothing  Ineffective treatments:  None tried  Associated symptoms: no back pain, no decreased ROM, no fatigue, no fever, no itching, no muscle weakness, no neck pain, no numbness, no stiffness, no swelling and no tingling        Prior to Admission Medications   Prescriptions Last Dose Informant Patient Reported? Taking?   aspirin 81 mg chewable tablet  Self Yes No   Sig: Chew 81 mg daily   lisinopril (ZESTRIL) 10 mg tablet   Yes No   simvastatin (ZOCOR) 40 mg tablet   Yes No   tamoxifen (NOLVADEX) 20 mg tablet  Self Yes No   Sig: Take 20 mg by mouth daily      Facility-Administered Medications: None       Past Medical History:   Diagnosis Date    Allergic rhinitis     Breast CA (HCC)     IgA nephropathy        Past Surgical History:   Procedure Laterality Date    BREAST BIOPSY Left     l breast    IR IMAGE GUIDED BIOPSY/ASPIRATION KIDNEY  12/19/2018    MASTECTOMY Bilateral 07/25/2019       History reviewed  No pertinent family history  I have reviewed and agree with the history as documented      Social History     Tobacco Use    Smoking status: Never Smoker  Smokeless tobacco: Never Used   Substance Use Topics    Alcohol use: Yes     Comment: socially    Drug use: No        Review of Systems   Constitutional: Negative for fatigue and fever  HENT: Negative  Eyes: Negative  Respiratory: Negative  Cardiovascular: Negative  Gastrointestinal: Negative  Endocrine: Negative  Genitourinary: Negative  Musculoskeletal: Negative for back pain, neck pain and stiffness  Skin: Negative  Negative for itching  Allergic/Immunologic: Negative  Neurological: Negative  Hematological: Negative  Psychiatric/Behavioral: Negative  Physical Exam  Physical Exam   Constitutional: She is oriented to person, place, and time  She appears well-developed and well-nourished  No distress  HENT:   Head: Normocephalic and atraumatic  Right Ear: External ear normal    Left Ear: External ear normal    Nose: Nose normal    Mouth/Throat: Oropharynx is clear and moist  No oropharyngeal exudate  Eyes: Pupils are equal, round, and reactive to light  Conjunctivae and EOM are normal  Right eye exhibits no discharge  Left eye exhibits no discharge  No scleral icterus  Neck: Normal range of motion  Neck supple  No tracheal deviation present  No thyromegaly present  Cardiovascular: Normal rate, regular rhythm and normal heart sounds  Pulmonary/Chest: Effort normal and breath sounds normal  No stridor  No respiratory distress  Abdominal: Soft  Bowel sounds are normal  She exhibits no distension  There is no tenderness  Musculoskeletal: Normal range of motion  She exhibits no edema or deformity  Right lower leg: She exhibits tenderness  Lymphadenopathy:     She has no cervical adenopathy  Neurological: She is alert and oriented to person, place, and time  No cranial nerve deficit or sensory deficit  She exhibits normal muscle tone  Coordination normal    Skin: Skin is warm and dry  No rash noted  She is not diaphoretic  No erythema  No pallor  Psychiatric: She has a normal mood and affect  Her behavior is normal  Judgment and thought content normal    Nursing note and vitals reviewed        Vital Signs  ED Triage Vitals [09/16/19 1110]   Temperature Pulse Respirations Blood Pressure SpO2   97 8 °F (36 6 °C) 63 16 146/68 99 %      Temp Source Heart Rate Source Patient Position - Orthostatic VS BP Location FiO2 (%)   Temporal Monitor Sitting Left arm --      Pain Score       3           Vitals:    09/16/19 1110   BP: 146/68   Pulse: 63   Patient Position - Orthostatic VS: Sitting         Visual Acuity      ED Medications  Medications - No data to display    Diagnostic Studies  Results Reviewed     Procedure Component Value Units Date/Time    Protime-INR [[id number]]  (Normal) Collected:  09/16/19 1130    Lab Status:  Final result Specimen:  Blood from Arm, Left Updated:  09/16/19 1156     Protime 11 9 seconds      INR 1 03    APTT [[id number]]  (Normal) Collected:  09/16/19 1130    Lab Status:  Final result Specimen:  Blood from Arm, Left Updated:  09/16/19 1156     PTT 32 seconds     Comprehensive metabolic panel [[id number]]  (Abnormal) Collected:  09/16/19 1130    Lab Status:  Final result Specimen:  Blood from Arm, Left Updated:  09/16/19 1156     Sodium 139 mmol/L      Potassium 4 4 mmol/L      Chloride 107 mmol/L      CO2 26 mmol/L      ANION GAP 6 mmol/L      BUN 12 mg/dL      Creatinine 1 05 mg/dL      Glucose 91 mg/dL      Calcium 9 1 mg/dL      AST 13 U/L      ALT 6 U/L      Alkaline Phosphatase 47 U/L      Total Protein 7 1 g/dL      Albumin 4 0 g/dL      Total Bilirubin 0 50 mg/dL      eGFR 62 ml/min/1 73sq m     Narrative:       Yehuda guidelines for Chronic Kidney Disease (CKD):     Stage 1 with normal or high GFR (GFR > 90 mL/min/1 73 square meters)    Stage 2 Mild CKD (GFR = 60-89 mL/min/1 73 square meters)    Stage 3A Moderate CKD (GFR = 45-59 mL/min/1 73 square meters)    Stage 3B Moderate CKD (GFR = 30-44 mL/min/1 73 square meters)    Stage 4 Severe CKD (GFR = 15-29 mL/min/1 73 square meters)    Stage 5 End Stage CKD (GFR <15 mL/min/1 73 square meters)  Note: GFR calculation is accurate only with a steady state creatinine    CBC and differential [[id number]]  (Abnormal) Collected:  09/16/19 1130    Lab Status:  Final result Specimen:  Blood from Arm, Left Updated:  09/16/19 1147     WBC 9 10 Thousand/uL      RBC 4 90 Million/uL      Hemoglobin 13 5 g/dL      Hematocrit 41 1 %      MCV 84 fL      MCH 27 6 pg      MCHC 32 9 g/dL      RDW 13 6 %      MPV 7 6 fL      Platelets 145 Thousands/uL      Neutrophils Relative 72 %      Lymphocytes Relative 19 %      Monocytes Relative 6 %      Eosinophils Relative 2 %      Basophils Relative 1 %      Neutrophils Absolute 6 60 Thousands/µL      Lymphocytes Absolute 1 70 Thousands/µL      Monocytes Absolute 0 60 Thousand/µL      Eosinophils Absolute 0 20 Thousand/µL      Basophils Absolute 0 10 Thousands/µL                  VAS lower limb venous duplex study, unilateral/limited    (Results Pending)              Procedures  Procedures       ED Course  ED Course as of Sep 16 1306   Mon Sep 16, 2019   1300 Verbal report was obtained from the ultrasound technician and showed negative results for DVT  1300 Patient is resting comfortably on the stretcher  I discussed with her the results of her blood work, and venous doppler studies of her right leg                                    MDM  Number of Diagnoses or Management Options  Right leg pain: new and requires workup     Amount and/or Complexity of Data Reviewed  Clinical lab tests: ordered and reviewed  Tests in the radiology section of CPT®: ordered and reviewed  Tests in the medicine section of CPT®: ordered and reviewed  Decide to obtain previous medical records or to obtain history from someone other than the patient: yes  Review and summarize past medical records: yes  Independent visualization of images, tracings, or specimens: yes    Risk of Complications, Morbidity, and/or Mortality  Presenting problems: moderate  Diagnostic procedures: moderate  Management options: moderate    Patient Progress  Patient progress: stable      Disposition  Final diagnoses:   Right leg pain     Time reflects when diagnosis was documented in both MDM as applicable and the Disposition within this note     Time User Action Codes Description Comment    9/16/2019  1:04 PM Bobbi Thuy Add [V21 824] Right leg pain       ED Disposition     ED Disposition Condition Date/Time Comment    Discharge Stable Mon Sep 16, 2019  1:04 PM Mykel Mendoza discharge to home/self care  Follow-up Information     Follow up With Specialties Details Why Sue Johnson MD Nephrology, Internal Medicine In 3 days  [address] [address]  [phone]            Patient's Medications   Discharge Prescriptions    No medications on file     No discharge procedures on file      ED Provider  Electronically Signed by           Tessa Lorenzo MD  09/16/19 6894

## 2019-11-20 ENCOUNTER — APPOINTMENT (OUTPATIENT)
Dept: LAB | Facility: CLINIC | Age: 49
End: 2019-11-20
Payer: COMMERCIAL

## 2019-11-20 ENCOUNTER — TRANSCRIBE ORDERS (OUTPATIENT)
Dept: URGENT CARE | Facility: CLINIC | Age: 49
End: 2019-11-20

## 2019-11-20 DIAGNOSIS — C50.411 MALIGNANT NEOPLASM OF UPPER-OUTER QUADRANT OF RIGHT FEMALE BREAST, UNSPECIFIED ESTROGEN RECEPTOR STATUS (HCC): Primary | ICD-10-CM

## 2019-11-20 DIAGNOSIS — Z17.0 ESTROGEN RECEPTOR POSITIVE: ICD-10-CM

## 2019-11-20 LAB
ALBUMIN SERPL BCP-MCNC: 3.8 G/DL (ref 3.5–5)
ALP SERPL-CCNC: 46 U/L (ref 46–116)
ALT SERPL W P-5'-P-CCNC: 21 U/L (ref 12–78)
ANION GAP SERPL CALCULATED.3IONS-SCNC: 6 MMOL/L (ref 4–13)
AST SERPL W P-5'-P-CCNC: 16 U/L (ref 5–45)
BILIRUB SERPL-MCNC: 0.55 MG/DL (ref 0.2–1)
BUN SERPL-MCNC: 11 MG/DL (ref 5–25)
CALCIUM SERPL-MCNC: 9.1 MG/DL (ref 8.3–10.1)
CHLORIDE SERPL-SCNC: 111 MMOL/L (ref 100–108)
CO2 SERPL-SCNC: 24 MMOL/L (ref 21–32)
CREAT SERPL-MCNC: 1.19 MG/DL (ref 0.6–1.3)
GFR SERPL CREATININE-BSD FRML MDRD: 53 ML/MIN/1.73SQ M
GLUCOSE P FAST SERPL-MCNC: 93 MG/DL (ref 65–99)
POTASSIUM SERPL-SCNC: 4.3 MMOL/L (ref 3.5–5.3)
PROT SERPL-MCNC: 7.4 G/DL (ref 6.4–8.2)
SODIUM SERPL-SCNC: 141 MMOL/L (ref 136–145)

## 2019-11-20 PROCEDURE — 80053 COMPREHEN METABOLIC PANEL: CPT

## 2019-11-20 PROCEDURE — 36415 COLL VENOUS BLD VENIPUNCTURE: CPT

## 2019-11-27 ENCOUNTER — TRANSCRIBE ORDERS (OUTPATIENT)
Dept: URGENT CARE | Facility: CLINIC | Age: 49
End: 2019-11-27

## 2019-11-27 ENCOUNTER — APPOINTMENT (OUTPATIENT)
Dept: LAB | Facility: CLINIC | Age: 49
End: 2019-11-27
Payer: COMMERCIAL

## 2019-11-27 DIAGNOSIS — E78.5 HYPERLIPIDEMIA, UNSPECIFIED HYPERLIPIDEMIA TYPE: ICD-10-CM

## 2019-11-27 DIAGNOSIS — N02.8 PRIMARY IGA NEPHROPATHY: Primary | ICD-10-CM

## 2019-11-27 DIAGNOSIS — I10 HYPERTENSION, UNSPECIFIED TYPE: ICD-10-CM

## 2019-11-27 LAB
25(OH)D3 SERPL-MCNC: 14.2 NG/ML (ref 30–100)
ALBUMIN SERPL BCP-MCNC: 3.7 G/DL (ref 3.5–5)
ALP SERPL-CCNC: 40 U/L (ref 46–116)
ALT SERPL W P-5'-P-CCNC: 16 U/L (ref 12–78)
AMORPH URATE CRY URNS QL MICRO: ABNORMAL /HPF
ANION GAP SERPL CALCULATED.3IONS-SCNC: 3 MMOL/L (ref 4–13)
AST SERPL W P-5'-P-CCNC: 13 U/L (ref 5–45)
BACTERIA UR QL AUTO: ABNORMAL /HPF
BASOPHILS # BLD AUTO: 0.08 THOUSANDS/ΜL (ref 0–0.1)
BASOPHILS NFR BLD AUTO: 1 % (ref 0–1)
BILIRUB SERPL-MCNC: 0.38 MG/DL (ref 0.2–1)
BILIRUB UR QL STRIP: NEGATIVE
BUN SERPL-MCNC: 12 MG/DL (ref 5–25)
CALCIUM SERPL-MCNC: 8.7 MG/DL (ref 8.3–10.1)
CHLORIDE SERPL-SCNC: 113 MMOL/L (ref 100–108)
CHOLEST SERPL-MCNC: 115 MG/DL (ref 50–200)
CLARITY UR: ABNORMAL
CO2 SERPL-SCNC: 27 MMOL/L (ref 21–32)
COLOR UR: YELLOW
CREAT SERPL-MCNC: 1.09 MG/DL (ref 0.6–1.3)
EOSINOPHIL # BLD AUTO: 0.1 THOUSAND/ΜL (ref 0–0.61)
EOSINOPHIL NFR BLD AUTO: 2 % (ref 0–6)
ERYTHROCYTE [DISTWIDTH] IN BLOOD BY AUTOMATED COUNT: 13.5 % (ref 11.6–15.1)
GFR SERPL CREATININE-BSD FRML MDRD: 59 ML/MIN/1.73SQ M
GLUCOSE P FAST SERPL-MCNC: 91 MG/DL (ref 65–99)
GLUCOSE UR STRIP-MCNC: NEGATIVE MG/DL
HCT VFR BLD AUTO: 39.7 % (ref 34.8–46.1)
HDLC SERPL-MCNC: 42 MG/DL
HGB BLD-MCNC: 12.2 G/DL (ref 11.5–15.4)
HGB UR QL STRIP.AUTO: ABNORMAL
IMM GRANULOCYTES # BLD AUTO: 0.01 THOUSAND/UL (ref 0–0.2)
IMM GRANULOCYTES NFR BLD AUTO: 0 % (ref 0–2)
IRON SERPL-MCNC: 46 UG/DL (ref 50–170)
KETONES UR STRIP-MCNC: NEGATIVE MG/DL
LDLC SERPL CALC-MCNC: 42 MG/DL (ref 0–100)
LEUKOCYTE ESTERASE UR QL STRIP: NEGATIVE
LYMPHOCYTES # BLD AUTO: 1.47 THOUSANDS/ΜL (ref 0.6–4.47)
LYMPHOCYTES NFR BLD AUTO: 24 % (ref 14–44)
MCH RBC QN AUTO: 26.4 PG (ref 26.8–34.3)
MCHC RBC AUTO-ENTMCNC: 30.7 G/DL (ref 31.4–37.4)
MCV RBC AUTO: 86 FL (ref 82–98)
MONOCYTES # BLD AUTO: 0.42 THOUSAND/ΜL (ref 0.17–1.22)
MONOCYTES NFR BLD AUTO: 7 % (ref 4–12)
NEUTROPHILS # BLD AUTO: 4.11 THOUSANDS/ΜL (ref 1.85–7.62)
NEUTS SEG NFR BLD AUTO: 66 % (ref 43–75)
NITRITE UR QL STRIP: NEGATIVE
NON-SQ EPI CELLS URNS QL MICRO: ABNORMAL /HPF
NONHDLC SERPL-MCNC: 73 MG/DL
NRBC BLD AUTO-RTO: 0 /100 WBCS
PH UR STRIP.AUTO: 5 [PH]
PLATELET # BLD AUTO: 197 THOUSANDS/UL (ref 149–390)
PMV BLD AUTO: 10.7 FL (ref 8.9–12.7)
POTASSIUM SERPL-SCNC: 4.7 MMOL/L (ref 3.5–5.3)
PROT SERPL-MCNC: 6.6 G/DL (ref 6.4–8.2)
PROT UR STRIP-MCNC: NEGATIVE MG/DL
RBC # BLD AUTO: 4.62 MILLION/UL (ref 3.81–5.12)
RBC #/AREA URNS AUTO: ABNORMAL /HPF
SODIUM SERPL-SCNC: 143 MMOL/L (ref 136–145)
SP GR UR STRIP.AUTO: 1.03 (ref 1–1.03)
T4 FREE SERPL-MCNC: 1.11 NG/DL (ref 0.76–1.46)
TRIGL SERPL-MCNC: 153 MG/DL
TSH SERPL DL<=0.05 MIU/L-ACNC: 2.74 UIU/ML (ref 0.36–3.74)
UROBILINOGEN UR QL STRIP.AUTO: 0.2 E.U./DL
VIT B12 SERPL-MCNC: 438 PG/ML (ref 100–900)
WBC # BLD AUTO: 6.19 THOUSAND/UL (ref 4.31–10.16)
WBC #/AREA URNS AUTO: ABNORMAL /HPF

## 2019-11-27 PROCEDURE — 82306 VITAMIN D 25 HYDROXY: CPT

## 2019-11-27 PROCEDURE — 80061 LIPID PANEL: CPT

## 2019-11-27 PROCEDURE — 81001 URINALYSIS AUTO W/SCOPE: CPT

## 2019-11-27 PROCEDURE — 84439 ASSAY OF FREE THYROXINE: CPT

## 2019-11-27 PROCEDURE — 82607 VITAMIN B-12: CPT

## 2019-11-27 PROCEDURE — 85025 COMPLETE CBC W/AUTO DIFF WBC: CPT

## 2019-11-27 PROCEDURE — 80053 COMPREHEN METABOLIC PANEL: CPT

## 2019-11-27 PROCEDURE — 36415 COLL VENOUS BLD VENIPUNCTURE: CPT

## 2019-11-27 PROCEDURE — 83540 ASSAY OF IRON: CPT

## 2019-11-27 PROCEDURE — 84443 ASSAY THYROID STIM HORMONE: CPT

## 2020-02-25 ENCOUNTER — APPOINTMENT (OUTPATIENT)
Dept: LAB | Facility: CLINIC | Age: 50
End: 2020-02-25
Payer: COMMERCIAL

## 2020-02-25 ENCOUNTER — TRANSCRIBE ORDERS (OUTPATIENT)
Dept: LAB | Facility: CLINIC | Age: 50
End: 2020-02-25

## 2020-02-25 DIAGNOSIS — C50.411 MALIGNANT NEOPLASM OF UPPER-OUTER QUADRANT OF RIGHT FEMALE BREAST, UNSPECIFIED ESTROGEN RECEPTOR STATUS (HCC): ICD-10-CM

## 2020-02-25 DIAGNOSIS — C50.411 MALIGNANT NEOPLASM OF UPPER-OUTER QUADRANT OF RIGHT FEMALE BREAST, UNSPECIFIED ESTROGEN RECEPTOR STATUS (HCC): Primary | ICD-10-CM

## 2020-02-25 DIAGNOSIS — Z17.0 ESTROGEN RECEPTOR POSITIVE: ICD-10-CM

## 2020-02-25 LAB
BASOPHILS # BLD AUTO: 0.05 THOUSANDS/ΜL (ref 0–0.1)
BASOPHILS NFR BLD AUTO: 1 % (ref 0–1)
EOSINOPHIL # BLD AUTO: 0.11 THOUSAND/ΜL (ref 0–0.61)
EOSINOPHIL NFR BLD AUTO: 2 % (ref 0–6)
ERYTHROCYTE [DISTWIDTH] IN BLOOD BY AUTOMATED COUNT: 13.6 % (ref 11.6–15.1)
ESTRADIOL SERPL-MCNC: 383 PG/ML
FSH SERPL-ACNC: 42.8 MIU/ML
HCT VFR BLD AUTO: 40.6 % (ref 34.8–46.1)
HGB BLD-MCNC: 12.7 G/DL (ref 11.5–15.4)
IMM GRANULOCYTES # BLD AUTO: 0.01 THOUSAND/UL (ref 0–0.2)
IMM GRANULOCYTES NFR BLD AUTO: 0 % (ref 0–2)
LYMPHOCYTES # BLD AUTO: 1.57 THOUSANDS/ΜL (ref 0.6–4.47)
LYMPHOCYTES NFR BLD AUTO: 28 % (ref 14–44)
MCH RBC QN AUTO: 27 PG (ref 26.8–34.3)
MCHC RBC AUTO-ENTMCNC: 31.3 G/DL (ref 31.4–37.4)
MCV RBC AUTO: 86 FL (ref 82–98)
MONOCYTES # BLD AUTO: 0.37 THOUSAND/ΜL (ref 0.17–1.22)
MONOCYTES NFR BLD AUTO: 7 % (ref 4–12)
NEUTROPHILS # BLD AUTO: 3.49 THOUSANDS/ΜL (ref 1.85–7.62)
NEUTS SEG NFR BLD AUTO: 62 % (ref 43–75)
NRBC BLD AUTO-RTO: 0 /100 WBCS
PLATELET # BLD AUTO: 228 THOUSANDS/UL (ref 149–390)
PMV BLD AUTO: 10 FL (ref 8.9–12.7)
RBC # BLD AUTO: 4.7 MILLION/UL (ref 3.81–5.12)
WBC # BLD AUTO: 5.6 THOUSAND/UL (ref 4.31–10.16)

## 2020-02-25 PROCEDURE — 83001 ASSAY OF GONADOTROPIN (FSH): CPT

## 2020-02-25 PROCEDURE — 85025 COMPLETE CBC W/AUTO DIFF WBC: CPT

## 2020-02-25 PROCEDURE — 82670 ASSAY OF TOTAL ESTRADIOL: CPT

## 2020-02-25 PROCEDURE — 36415 COLL VENOUS BLD VENIPUNCTURE: CPT

## 2020-06-29 ENCOUNTER — APPOINTMENT (OUTPATIENT)
Dept: LAB | Facility: CLINIC | Age: 50
End: 2020-06-29
Payer: COMMERCIAL

## 2020-06-29 ENCOUNTER — TRANSCRIBE ORDERS (OUTPATIENT)
Dept: URGENT CARE | Facility: CLINIC | Age: 50
End: 2020-06-29

## 2020-06-29 DIAGNOSIS — C50.919 MALIGNANT NEOPLASM OF FEMALE BREAST, UNSPECIFIED ESTROGEN RECEPTOR STATUS, UNSPECIFIED LATERALITY, UNSPECIFIED SITE OF BREAST (HCC): Primary | ICD-10-CM

## 2020-06-29 LAB
BASOPHILS # BLD AUTO: 0.06 THOUSANDS/ΜL (ref 0–0.1)
BASOPHILS NFR BLD AUTO: 1 % (ref 0–1)
EOSINOPHIL # BLD AUTO: 0.09 THOUSAND/ΜL (ref 0–0.61)
EOSINOPHIL NFR BLD AUTO: 1 % (ref 0–6)
ERYTHROCYTE [DISTWIDTH] IN BLOOD BY AUTOMATED COUNT: 13.5 % (ref 11.6–15.1)
ESTRADIOL SERPL-MCNC: 38 PG/ML
FSH SERPL-ACNC: 66.6 MIU/ML
HCT VFR BLD AUTO: 40.5 % (ref 34.8–46.1)
HGB BLD-MCNC: 13.1 G/DL (ref 11.5–15.4)
IMM GRANULOCYTES # BLD AUTO: 0.01 THOUSAND/UL (ref 0–0.2)
IMM GRANULOCYTES NFR BLD AUTO: 0 % (ref 0–2)
LH SERPL-ACNC: 36.1 MIU/ML
LYMPHOCYTES # BLD AUTO: 1.71 THOUSANDS/ΜL (ref 0.6–4.47)
LYMPHOCYTES NFR BLD AUTO: 27 % (ref 14–44)
MCH RBC QN AUTO: 28.1 PG (ref 26.8–34.3)
MCHC RBC AUTO-ENTMCNC: 32.3 G/DL (ref 31.4–37.4)
MCV RBC AUTO: 87 FL (ref 82–98)
MONOCYTES # BLD AUTO: 0.52 THOUSAND/ΜL (ref 0.17–1.22)
MONOCYTES NFR BLD AUTO: 8 % (ref 4–12)
NEUTROPHILS # BLD AUTO: 3.85 THOUSANDS/ΜL (ref 1.85–7.62)
NEUTS SEG NFR BLD AUTO: 63 % (ref 43–75)
NRBC BLD AUTO-RTO: 0 /100 WBCS
PLATELET # BLD AUTO: 203 THOUSANDS/UL (ref 149–390)
PMV BLD AUTO: 10.7 FL (ref 8.9–12.7)
RBC # BLD AUTO: 4.66 MILLION/UL (ref 3.81–5.12)
WBC # BLD AUTO: 6.24 THOUSAND/UL (ref 4.31–10.16)

## 2020-06-29 PROCEDURE — 85025 COMPLETE CBC W/AUTO DIFF WBC: CPT

## 2020-06-29 PROCEDURE — 36415 COLL VENOUS BLD VENIPUNCTURE: CPT

## 2020-06-29 PROCEDURE — 83002 ASSAY OF GONADOTROPIN (LH): CPT

## 2020-06-29 PROCEDURE — 82670 ASSAY OF TOTAL ESTRADIOL: CPT

## 2020-06-29 PROCEDURE — 83001 ASSAY OF GONADOTROPIN (FSH): CPT

## 2020-07-17 ENCOUNTER — TRANSCRIBE ORDERS (OUTPATIENT)
Dept: LAB | Facility: HOSPITAL | Age: 50
End: 2020-07-17

## 2020-07-17 ENCOUNTER — TRANSCRIBE ORDERS (OUTPATIENT)
Dept: ADMINISTRATIVE | Facility: HOSPITAL | Age: 50
End: 2020-07-17

## 2020-07-17 ENCOUNTER — HOSPITAL ENCOUNTER (OUTPATIENT)
Dept: RADIOLOGY | Facility: HOSPITAL | Age: 50
Discharge: HOME | End: 2020-07-17
Attending: INTERNAL MEDICINE
Payer: COMMERCIAL

## 2020-07-17 ENCOUNTER — LAB (OUTPATIENT)
Dept: LAB | Facility: HOSPITAL | Age: 50
End: 2020-07-17
Attending: INTERNAL MEDICINE
Payer: COMMERCIAL

## 2020-07-17 DIAGNOSIS — R80.9 PROTEINURIA, UNSPECIFIED TYPE: ICD-10-CM

## 2020-07-17 DIAGNOSIS — C50.411 MALIGNANT NEOPLASM OF UPPER-OUTER QUADRANT OF RIGHT FEMALE BREAST, UNSPECIFIED ESTROGEN RECEPTOR STATUS (HCC): ICD-10-CM

## 2020-07-17 DIAGNOSIS — E55.9 AVITAMINOSIS D: ICD-10-CM

## 2020-07-17 DIAGNOSIS — R05.3 CHRONIC COUGH: ICD-10-CM

## 2020-07-17 DIAGNOSIS — R05.3 CHRONIC COUGH: Primary | ICD-10-CM

## 2020-07-17 LAB
25(OH)D3 SERPL-MCNC: 35.5 NG/ML (ref 30–100)
ALBUMIN SERPL BCP-MCNC: 4.1 G/DL (ref 3.5–5.7)
ALP SERPL-CCNC: 45 U/L (ref 40–150)
ALT SERPL W P-5'-P-CCNC: 17 U/L (ref 7–52)
ANION GAP SERPL CALCULATED.3IONS-SCNC: 9 MMOL/L (ref 4–13)
AST SERPL W P-5'-P-CCNC: 18 U/L (ref 13–39)
BACTERIA UR QL AUTO: ABNORMAL /HPF
BASOPHILS # BLD AUTO: 0.1 THOUSANDS/ΜL (ref 0–0.1)
BASOPHILS NFR BLD AUTO: 1 % (ref 0–2)
BILIRUB SERPL-MCNC: 0.4 MG/DL (ref 0.2–1)
BILIRUB UR QL STRIP: NEGATIVE
BUN SERPL-MCNC: 8 MG/DL (ref 7–25)
CALCIUM SERPL-MCNC: 9.3 MG/DL (ref 8.6–10.5)
CHLORIDE SERPL-SCNC: 106 MMOL/L (ref 98–107)
CHOLEST SERPL-MCNC: 129 MG/DL (ref 0–200)
CLARITY UR: ABNORMAL
CO2 SERPL-SCNC: 28 MMOL/L (ref 21–31)
COLOR UR: YELLOW
CREAT SERPL-MCNC: 1.08 MG/DL (ref 0.6–1.2)
EOSINOPHIL # BLD AUTO: 0.1 THOUSAND/ΜL (ref 0–0.61)
EOSINOPHIL NFR BLD AUTO: 2 % (ref 0–5)
ERYTHROCYTE [DISTWIDTH] IN BLOOD BY AUTOMATED COUNT: 13.9 % (ref 11.5–14.5)
GFR SERPL CREATININE-BSD FRML MDRD: 59 ML/MIN/1.73SQ M
GLUCOSE P FAST SERPL-MCNC: 90 MG/DL (ref 65–99)
GLUCOSE UR STRIP-MCNC: NEGATIVE MG/DL
HCT VFR BLD AUTO: 39.9 % (ref 42–47)
HDLC SERPL-MCNC: 40 MG/DL
HGB BLD-MCNC: 13.5 G/DL (ref 12–16)
HGB UR QL STRIP.AUTO: ABNORMAL
KETONES UR STRIP-MCNC: NEGATIVE MG/DL
LDLC SERPL CALC-MCNC: 64 MG/DL (ref 0–100)
LEUKOCYTE ESTERASE UR QL STRIP: ABNORMAL
LYMPHOCYTES # BLD AUTO: 1.7 THOUSANDS/ΜL (ref 0.6–4.47)
LYMPHOCYTES NFR BLD AUTO: 26 % (ref 21–51)
MCH RBC QN AUTO: 28.5 PG (ref 26–34)
MCHC RBC AUTO-ENTMCNC: 33.8 G/DL (ref 31–37)
MCV RBC AUTO: 85 FL (ref 81–99)
MONOCYTES # BLD AUTO: 0.4 THOUSAND/ΜL (ref 0.17–1.22)
MONOCYTES NFR BLD AUTO: 6 % (ref 2–12)
NEUTROPHILS # BLD AUTO: 4.4 THOUSANDS/ΜL (ref 1.4–6.5)
NEUTS SEG NFR BLD AUTO: 65 % (ref 42–75)
NITRITE UR QL STRIP: NEGATIVE
NON-SQ EPI CELLS URNS QL MICRO: ABNORMAL /HPF
NONHDLC SERPL-MCNC: 89 MG/DL
PH UR STRIP.AUTO: 5.5 [PH]
PLATELET # BLD AUTO: 221 THOUSANDS/UL (ref 149–390)
PMV BLD AUTO: 8.1 FL (ref 8.6–11.7)
POTASSIUM SERPL-SCNC: 3.7 MMOL/L (ref 3.5–5.5)
PROT SERPL-MCNC: 7.5 G/DL (ref 6.4–8.9)
PROT UR STRIP-MCNC: NEGATIVE MG/DL
RBC # BLD AUTO: 4.72 MILLION/UL (ref 3.9–5.2)
RBC #/AREA URNS AUTO: ABNORMAL /HPF
SODIUM SERPL-SCNC: 143 MMOL/L (ref 134–143)
SP GR UR STRIP.AUTO: 1.01 (ref 1–1.03)
T4 FREE SERPL-MCNC: 1.12 NG/DL (ref 0.76–1.46)
TRIGL SERPL-MCNC: 125 MG/DL (ref 44–166)
TSH SERPL DL<=0.05 MIU/L-ACNC: 2.49 UIU/ML (ref 0.45–5.33)
UROBILINOGEN UR QL STRIP.AUTO: 0.2 E.U./DL
WBC # BLD AUTO: 6.7 THOUSAND/UL (ref 4.8–10.8)
WBC #/AREA URNS AUTO: ABNORMAL /HPF

## 2020-07-17 PROCEDURE — 71046 X-RAY EXAM CHEST 2 VIEWS: CPT

## 2020-07-17 PROCEDURE — 81003 URINALYSIS AUTO W/O SCOPE: CPT

## 2020-07-17 PROCEDURE — 36415 COLL VENOUS BLD VENIPUNCTURE: CPT

## 2020-07-17 PROCEDURE — 80061 LIPID PANEL: CPT

## 2020-07-17 PROCEDURE — 85025 COMPLETE CBC W/AUTO DIFF WBC: CPT

## 2020-07-17 PROCEDURE — 84443 ASSAY THYROID STIM HORMONE: CPT

## 2020-07-17 PROCEDURE — 81001 URINALYSIS AUTO W/SCOPE: CPT

## 2020-07-17 PROCEDURE — 84439 ASSAY OF FREE THYROXINE: CPT

## 2020-07-17 PROCEDURE — 80053 COMPREHEN METABOLIC PANEL: CPT

## 2020-07-17 PROCEDURE — 82306 VITAMIN D 25 HYDROXY: CPT

## 2020-07-20 NOTE — PROCEDURES
Interventional Radiology Procedure Note    PATIENT NAME: Ankita Lockhart  : 1970  MRN: [id number]     Pre-op Diagnosis:   1  Kidney disorder      2  Paroxysmal proteinuria and hematuria    Post-op Diagnosis:   1  Kidney disorder      2     Same    Procedure: CT Guided Biopsy of the left kidney  Surgeon: Heather Cooper MD  Assistants: No qualified resident was available, Resident is only observing  Estimated Blood Loss: 10 cc  Findings: Please see full report in PACS  Specimens: Please see full report in PACS  Complications: None  Anesthesia: Conscious sedation  Prep: 2% Chlorhexidine and alcohol, allowed to dry prior to sterile draping  Timeout: Performed  Fluoro time: Please see full report in PACS  Radiation dose: Please see full report in PACS  Contrast dose: Please see full report in PACS  Contrast type: Please see full report in PACS  Contrast strength: Please see full report in PACS  Contrast route of administration: Please see full report in PACS  Antibiotics: None        Heather Cooper MD     Date: 2018  Time: 11:18 AM DISPLAY PLAN FREE TEXT

## 2020-07-22 ENCOUNTER — OFFICE VISIT (OUTPATIENT)
Dept: URGENT CARE | Facility: CLINIC | Age: 50
End: 2020-07-22
Payer: COMMERCIAL

## 2020-07-22 ENCOUNTER — APPOINTMENT (EMERGENCY)
Dept: CT IMAGING | Facility: HOSPITAL | Age: 50
End: 2020-07-22
Payer: COMMERCIAL

## 2020-07-22 ENCOUNTER — HOSPITAL ENCOUNTER (EMERGENCY)
Facility: HOSPITAL | Age: 50
End: 2020-07-22
Attending: INTERNAL MEDICINE
Payer: COMMERCIAL

## 2020-07-22 ENCOUNTER — HOSPITAL ENCOUNTER (INPATIENT)
Facility: HOSPITAL | Age: 50
LOS: 3 days | Discharge: HOME | DRG: 176 | End: 2020-07-25
Attending: INTERNAL MEDICINE | Admitting: INTERNAL MEDICINE
Payer: COMMERCIAL

## 2020-07-22 ENCOUNTER — APPOINTMENT (OUTPATIENT)
Dept: RADIOLOGY | Facility: CLINIC | Age: 50
End: 2020-07-22
Payer: COMMERCIAL

## 2020-07-22 VITALS
WEIGHT: 155 LBS | TEMPERATURE: 99.4 F | HEART RATE: 88 BPM | RESPIRATION RATE: 18 BRPM | HEIGHT: 63 IN | DIASTOLIC BLOOD PRESSURE: 83 MMHG | OXYGEN SATURATION: 99 % | SYSTOLIC BLOOD PRESSURE: 128 MMHG | BODY MASS INDEX: 27.46 KG/M2

## 2020-07-22 VITALS
DIASTOLIC BLOOD PRESSURE: 80 MMHG | SYSTOLIC BLOOD PRESSURE: 138 MMHG | TEMPERATURE: 99.6 F | RESPIRATION RATE: 18 BRPM | OXYGEN SATURATION: 91 %

## 2020-07-22 DIAGNOSIS — I26.99 PULMONARY INFARCT (HCC): Primary | ICD-10-CM

## 2020-07-22 DIAGNOSIS — R06.02 SHORTNESS OF BREATH: ICD-10-CM

## 2020-07-22 DIAGNOSIS — I26.99 BILATERAL PULMONARY EMBOLISM (HCC): Primary | ICD-10-CM

## 2020-07-22 DIAGNOSIS — I26.99 PULMONARY EMBOLISM AND INFARCTION (HCC): ICD-10-CM

## 2020-07-22 DIAGNOSIS — R06.02 SHORTNESS OF BREATH: Primary | ICD-10-CM

## 2020-07-22 PROBLEM — C50.919 BREAST CANCER (HCC): Status: ACTIVE | Noted: 2020-07-22

## 2020-07-22 PROBLEM — E78.5 HYPERLIPIDEMIA: Status: ACTIVE | Noted: 2020-07-22

## 2020-07-22 PROBLEM — I10 ESSENTIAL HYPERTENSION: Status: ACTIVE | Noted: 2020-07-22

## 2020-07-22 LAB
ALBUMIN SERPL BCP-MCNC: 3.8 G/DL (ref 3.5–5.7)
ALP SERPL-CCNC: 44 U/L (ref 40–150)
ALT SERPL W P-5'-P-CCNC: 11 U/L (ref 7–52)
ANION GAP SERPL CALCULATED.3IONS-SCNC: 11 MMOL/L (ref 4–13)
APTT PPP: 100 SECONDS (ref 23–37)
APTT PPP: 34 SECONDS (ref 23–37)
AST SERPL W P-5'-P-CCNC: 12 U/L (ref 13–39)
BASOPHILS # BLD AUTO: 0.1 THOUSANDS/ΜL (ref 0–0.1)
BASOPHILS NFR BLD AUTO: 1 % (ref 0–2)
BILIRUB SERPL-MCNC: 0.5 MG/DL (ref 0.2–1)
BUN SERPL-MCNC: 11 MG/DL (ref 7–25)
CALCIUM SERPL-MCNC: 8.8 MG/DL (ref 8.6–10.5)
CHLORIDE SERPL-SCNC: 103 MMOL/L (ref 98–107)
CO2 SERPL-SCNC: 25 MMOL/L (ref 21–31)
CREAT SERPL-MCNC: 1.15 MG/DL (ref 0.6–1.2)
D DIMER PPP FEU-MCNC: 1.37 UG/ML FEU
EOSINOPHIL # BLD AUTO: 0 THOUSAND/ΜL (ref 0–0.61)
EOSINOPHIL NFR BLD AUTO: 0 % (ref 0–5)
ERYTHROCYTE [DISTWIDTH] IN BLOOD BY AUTOMATED COUNT: 13.7 % (ref 11.5–14.5)
GFR SERPL CREATININE-BSD FRML MDRD: 55 ML/MIN/1.73SQ M
GLUCOSE SERPL-MCNC: 98 MG/DL (ref 65–99)
HCT VFR BLD AUTO: 37.1 % (ref 42–47)
HGB BLD-MCNC: 12.6 G/DL (ref 12–16)
INR PPP: 1.16 (ref 0.84–1.19)
LYMPHOCYTES # BLD AUTO: 1.6 THOUSANDS/ΜL (ref 0.6–4.47)
LYMPHOCYTES NFR BLD AUTO: 15 % (ref 21–51)
MCH RBC QN AUTO: 28.5 PG (ref 26–34)
MCHC RBC AUTO-ENTMCNC: 34.1 G/DL (ref 31–37)
MCV RBC AUTO: 84 FL (ref 81–99)
MONOCYTES # BLD AUTO: 0.7 THOUSAND/ΜL (ref 0.17–1.22)
MONOCYTES NFR BLD AUTO: 7 % (ref 2–12)
NEUTROPHILS # BLD AUTO: 8.1 THOUSANDS/ΜL (ref 1.4–6.5)
NEUTS SEG NFR BLD AUTO: 77 % (ref 42–75)
PLATELET # BLD AUTO: 222 THOUSANDS/UL (ref 149–390)
PMV BLD AUTO: 7.6 FL (ref 8.6–11.7)
POTASSIUM SERPL-SCNC: 3.8 MMOL/L (ref 3.5–5.5)
PROCALCITONIN SERPL-MCNC: <0.05 NG/ML
PROT SERPL-MCNC: 7 G/DL (ref 6.4–8.9)
PROTHROMBIN TIME: 14.7 SECONDS (ref 11.6–14.5)
RBC # BLD AUTO: 4.44 MILLION/UL (ref 3.9–5.2)
SARS-COV-2 RNA RESP QL NAA+PROBE: NEGATIVE
SODIUM SERPL-SCNC: 139 MMOL/L (ref 134–143)
TROPONIN I SERPL-MCNC: <0.03 NG/ML
WBC # BLD AUTO: 10.5 THOUSAND/UL (ref 4.8–10.8)

## 2020-07-22 PROCEDURE — 87635 SARS-COV-2 COVID-19 AMP PRB: CPT | Performed by: INTERNAL MEDICINE

## 2020-07-22 PROCEDURE — 85306 CLOT INHIBIT PROT S FREE: CPT | Performed by: INTERNAL MEDICINE

## 2020-07-22 PROCEDURE — 99223 1ST HOSP IP/OBS HIGH 75: CPT | Performed by: INTERNAL MEDICINE

## 2020-07-22 PROCEDURE — 85730 THROMBOPLASTIN TIME PARTIAL: CPT | Performed by: INTERNAL MEDICINE

## 2020-07-22 PROCEDURE — 85610 PROTHROMBIN TIME: CPT | Performed by: INTERNAL MEDICINE

## 2020-07-22 PROCEDURE — 85300 ANTITHROMBIN III ACTIVITY: CPT | Performed by: INTERNAL MEDICINE

## 2020-07-22 PROCEDURE — 85670 THROMBIN TIME PLASMA: CPT | Performed by: INTERNAL MEDICINE

## 2020-07-22 PROCEDURE — 85613 RUSSELL VIPER VENOM DILUTED: CPT | Performed by: INTERNAL MEDICINE

## 2020-07-22 PROCEDURE — 81240 F2 GENE: CPT | Performed by: INTERNAL MEDICINE

## 2020-07-22 PROCEDURE — 96361 HYDRATE IV INFUSION ADD-ON: CPT

## 2020-07-22 PROCEDURE — 99213 OFFICE O/P EST LOW 20 MIN: CPT | Performed by: NURSE PRACTITIONER

## 2020-07-22 PROCEDURE — 85303 CLOT INHIBIT PROT C ACTIVITY: CPT | Performed by: INTERNAL MEDICINE

## 2020-07-22 PROCEDURE — 86147 CARDIOLIPIN ANTIBODY EA IG: CPT | Performed by: INTERNAL MEDICINE

## 2020-07-22 PROCEDURE — 80053 COMPREHEN METABOLIC PANEL: CPT | Performed by: INTERNAL MEDICINE

## 2020-07-22 PROCEDURE — 99285 EMERGENCY DEPT VISIT HI MDM: CPT

## 2020-07-22 PROCEDURE — 71046 X-RAY EXAM CHEST 2 VIEWS: CPT

## 2020-07-22 PROCEDURE — 96375 TX/PRO/DX INJ NEW DRUG ADDON: CPT

## 2020-07-22 PROCEDURE — 85705 THROMBOPLASTIN INHIBITION: CPT | Performed by: INTERNAL MEDICINE

## 2020-07-22 PROCEDURE — 96366 THER/PROPH/DIAG IV INF ADDON: CPT

## 2020-07-22 PROCEDURE — 85305 CLOT INHIBIT PROT S TOTAL: CPT | Performed by: INTERNAL MEDICINE

## 2020-07-22 PROCEDURE — 85732 THROMBOPLASTIN TIME PARTIAL: CPT | Performed by: INTERNAL MEDICINE

## 2020-07-22 PROCEDURE — 81241 F5 GENE: CPT | Performed by: INTERNAL MEDICINE

## 2020-07-22 PROCEDURE — 71275 CT ANGIOGRAPHY CHEST: CPT

## 2020-07-22 PROCEDURE — 84484 ASSAY OF TROPONIN QUANT: CPT | Performed by: INTERNAL MEDICINE

## 2020-07-22 PROCEDURE — 99285 EMERGENCY DEPT VISIT HI MDM: CPT | Performed by: INTERNAL MEDICINE

## 2020-07-22 PROCEDURE — 84145 PROCALCITONIN (PCT): CPT | Performed by: INTERNAL MEDICINE

## 2020-07-22 PROCEDURE — 93005 ELECTROCARDIOGRAM TRACING: CPT

## 2020-07-22 PROCEDURE — 86146 BETA-2 GLYCOPROTEIN ANTIBODY: CPT | Performed by: INTERNAL MEDICINE

## 2020-07-22 PROCEDURE — 85379 FIBRIN DEGRADATION QUANT: CPT | Performed by: INTERNAL MEDICINE

## 2020-07-22 PROCEDURE — 36415 COLL VENOUS BLD VENIPUNCTURE: CPT | Performed by: INTERNAL MEDICINE

## 2020-07-22 PROCEDURE — 85025 COMPLETE CBC W/AUTO DIFF WBC: CPT | Performed by: INTERNAL MEDICINE

## 2020-07-22 PROCEDURE — 96365 THER/PROPH/DIAG IV INF INIT: CPT

## 2020-07-22 RX ORDER — LEVOCETIRIZINE DIHYDROCHLORIDE 5 MG/1
5 TABLET, FILM COATED ORAL EVERY EVENING
COMMUNITY

## 2020-07-22 RX ORDER — HEPARIN SODIUM 1000 [USP'U]/ML
2800 INJECTION, SOLUTION INTRAVENOUS; SUBCUTANEOUS
Status: DISCONTINUED | OUTPATIENT
Start: 2020-07-22 | End: 2020-07-22 | Stop reason: HOSPADM

## 2020-07-22 RX ORDER — LISINOPRIL 10 MG/1
10 TABLET ORAL DAILY
Status: DISCONTINUED | OUTPATIENT
Start: 2020-07-23 | End: 2020-07-25 | Stop reason: HOSPADM

## 2020-07-22 RX ORDER — MORPHINE SULFATE 4 MG/ML
4 INJECTION, SOLUTION INTRAMUSCULAR; INTRAVENOUS EVERY 4 HOURS PRN
Status: DISCONTINUED | OUTPATIENT
Start: 2020-07-22 | End: 2020-07-25 | Stop reason: HOSPADM

## 2020-07-22 RX ORDER — HEPARIN SODIUM 10000 [USP'U]/100ML
3-30 INJECTION, SOLUTION INTRAVENOUS
Status: DISPENSED | OUTPATIENT
Start: 2020-07-22 | End: 2020-07-23

## 2020-07-22 RX ORDER — PRAVASTATIN SODIUM 80 MG/1
80 TABLET ORAL
Status: DISCONTINUED | OUTPATIENT
Start: 2020-07-23 | End: 2020-07-25 | Stop reason: HOSPADM

## 2020-07-22 RX ORDER — ASPIRIN 81 MG/1
81 TABLET, CHEWABLE ORAL DAILY
Status: DISCONTINUED | OUTPATIENT
Start: 2020-07-23 | End: 2020-07-25 | Stop reason: HOSPADM

## 2020-07-22 RX ORDER — HEPARIN SODIUM 1000 [USP'U]/ML
5600 INJECTION, SOLUTION INTRAVENOUS; SUBCUTANEOUS ONCE
Status: COMPLETED | OUTPATIENT
Start: 2020-07-22 | End: 2020-07-22

## 2020-07-22 RX ORDER — ACETAMINOPHEN 325 MG/1
650 TABLET ORAL EVERY 6 HOURS PRN
Status: DISCONTINUED | OUTPATIENT
Start: 2020-07-22 | End: 2020-07-25 | Stop reason: HOSPADM

## 2020-07-22 RX ORDER — HYDRALAZINE HYDROCHLORIDE 20 MG/ML
5 INJECTION INTRAMUSCULAR; INTRAVENOUS EVERY 6 HOURS PRN
Status: DISCONTINUED | OUTPATIENT
Start: 2020-07-22 | End: 2020-07-25 | Stop reason: HOSPADM

## 2020-07-22 RX ORDER — HEPARIN SODIUM 1000 [USP'U]/ML
5600 INJECTION, SOLUTION INTRAVENOUS; SUBCUTANEOUS
Status: DISCONTINUED | OUTPATIENT
Start: 2020-07-22 | End: 2020-07-22 | Stop reason: HOSPADM

## 2020-07-22 RX ORDER — ONDANSETRON 2 MG/ML
4 INJECTION INTRAMUSCULAR; INTRAVENOUS EVERY 4 HOURS PRN
Status: DISCONTINUED | OUTPATIENT
Start: 2020-07-22 | End: 2020-07-25 | Stop reason: HOSPADM

## 2020-07-22 RX ORDER — LORATADINE 10 MG/1
10 TABLET ORAL DAILY
Status: DISCONTINUED | OUTPATIENT
Start: 2020-07-23 | End: 2020-07-25 | Stop reason: HOSPADM

## 2020-07-22 RX ORDER — MELATONIN
2000 DAILY
Status: DISCONTINUED | OUTPATIENT
Start: 2020-07-23 | End: 2020-07-25 | Stop reason: HOSPADM

## 2020-07-22 RX ORDER — SODIUM CHLORIDE 9 MG/ML
125 INJECTION, SOLUTION INTRAVENOUS CONTINUOUS
Status: DISCONTINUED | OUTPATIENT
Start: 2020-07-22 | End: 2020-07-22 | Stop reason: HOSPADM

## 2020-07-22 RX ORDER — HEPARIN SODIUM 10000 [USP'U]/100ML
3-30 INJECTION, SOLUTION INTRAVENOUS
Status: DISCONTINUED | OUTPATIENT
Start: 2020-07-22 | End: 2020-07-22 | Stop reason: HOSPADM

## 2020-07-22 RX ORDER — TAMOXIFEN CITRATE 10 MG/1
20 TABLET ORAL DAILY
Status: DISCONTINUED | OUTPATIENT
Start: 2020-07-23 | End: 2020-07-25 | Stop reason: HOSPADM

## 2020-07-22 RX ADMIN — IOHEXOL 100 ML: 350 INJECTION, SOLUTION INTRAVENOUS at 16:24

## 2020-07-22 RX ADMIN — HEPARIN SODIUM AND DEXTROSE 18 UNITS/KG/HR: 10000; 5 INJECTION INTRAVENOUS at 22:32

## 2020-07-22 RX ADMIN — SODIUM CHLORIDE 125 ML/HR: 0.9 INJECTION, SOLUTION INTRAVENOUS at 16:00

## 2020-07-22 RX ADMIN — HEPARIN SODIUM 5600 UNITS: 1000 INJECTION, SOLUTION INTRAVENOUS; SUBCUTANEOUS at 17:25

## 2020-07-22 RX ADMIN — HEPARIN SODIUM AND DEXTROSE 18 UNITS/KG/HR: 10000; 5 INJECTION INTRAVENOUS at 17:26

## 2020-07-22 NOTE — QUICK NOTE
I was called by Dr Celia Quinn to review this patient for possible admission to our [facility]  Patient is a 52-year-old female, who recently underwent mastectomy surgery because of a history of breast cancer, and is a patient that presented to the emergency department today with shortness of breath  As part of the workup in the emergency room, the patient had a CT scan of the chest PE protocol which yielded the following results:    Findings consistent with bilateral lower lobe segmental/subsegmental pulmonary emboli with overall moderate clot burden   Somewhat wedge-shaped opacity in the right lower lobe is likely secondary to pulmonary infarct  For the most part the patient is hemodynamically stable, however is demonstrating signs and symptoms of hypoxia requiring supplemental oxygen  I reviewed this case with the critical care physician, Dr Chip Guevara, who felt that the patient may be better served at a higher level tertiary care center such as the [facility] where Interventional Radiology is available for possible thrombolysis/thrombectomy  Additionally CT surgery would be available if need be  I then reviewed the patient's case, with the patient's primary care physician Dr Juice Grace, he was in agreement that the patient will benefit from transfer  I finally spoke with Dr Claudeen Poplar from Interventional Radiology, he also felt that the patient would benefit from going to the [address] [place] [address] for a thrombectomy and or thrombolysis  The interventional radiology department here does not have all of the necessary means to perform thrombolysis, and we also do not have a pulmonary embolus rescue team either  I reiterated these concerns to the ER physician, who too was in agreement that the patient should be transferred  I did not have a face-to-face encounter with this patient

## 2020-07-22 NOTE — ED PROVIDER NOTES
History  Chief Complaint   Patient presents with    Shortness of Breath     chest heaviness  worsens when she went outside  Very pleasant 56-year-old female sent to the emergency room by the [facility] heard care  Patient was seen down there earlier today for increasing chest tightness and shortness of breath on exertion  She denied any chest pain pressure heaviness just complains of a tightness  Patient has history of IgA nephropathy, and breast cancer status post bilateral mastectomy in March of 2019  She has no history of coronary artery disease, diabetes, prior history of DVT or PE, and no history of tobacco use  The patient has not been exercising lately but before COVID try to exercise regularly  She denies any recent injury trauma or travel, the patient also denies any calf pain or swelling  Patient was unaware that she was febrile, she is febrile on arrival at 100 5  Her oxygen saturation was in the low 90s at the urgent care  The patient states she developed a dry nonproductive hacking cough past several days  She has not really chest pain pressure heaviness but more of a difficulty catching her air and states  Prior to Admission Medications   Prescriptions Last Dose Informant Patient Reported? Taking?    Cholecalciferol 50 MCG (2000 UT) CAPS   Yes No   Sig: Take 1 capsule by mouth daily   aspirin 81 mg chewable tablet  Self Yes No   Sig: Chew 81 mg daily   levocetirizine (Xyzal) 5 MG tablet   Yes Yes   Sig: Take 5 mg by mouth every evening   lisinopril (ZESTRIL) 10 mg tablet   Yes No   simvastatin (ZOCOR) 40 mg tablet   Yes No   tamoxifen (NOLVADEX) 20 mg tablet  Self Yes No   Sig: Take 20 mg by mouth daily      Facility-Administered Medications: None       Past Medical History:   Diagnosis Date    Allergic rhinitis     Breast CA (HCC)     IgA nephropathy        Past Surgical History:   Procedure Laterality Date    BREAST BIOPSY Left     l breast    IR IMAGE GUIDED BIOPSY/ASPIRATION KIDNEY  12/19/2018    MASTECTOMY Bilateral 07/25/2019       History reviewed  No pertinent family history  I have reviewed and agree with the history as documented  E-Cigarette/Vaping     E-Cigarette/Vaping Substances     Social History     Tobacco Use    Smoking status: Never Smoker    Smokeless tobacco: Never Used   Substance Use Topics    Alcohol use: Yes     Comment: socially    Drug use: No       Review of Systems   Constitutional: Negative  HENT: Negative  Eyes: Negative  Respiratory: Positive for cough, chest tightness and shortness of breath  Negative for apnea, choking, wheezing and stridor  Cardiovascular: Negative  Gastrointestinal: Negative  Genitourinary: Negative  Musculoskeletal: Negative  Allergic/Immunologic: Negative  Neurological: Negative  Psychiatric/Behavioral: Negative  Physical Exam  Physical Exam   Constitutional: She is oriented to person, place, and time  She appears well-developed and well-nourished  HENT:   Head: Normocephalic and atraumatic  Mouth/Throat: Oropharynx is clear and moist    Eyes: Pupils are equal, round, and reactive to light  EOM are normal    Pulmonary/Chest: Effort normal and breath sounds normal  No accessory muscle usage  No tachypnea  No respiratory distress  Abdominal: Soft  Bowel sounds are normal    Musculoskeletal: Normal range of motion  Right lower leg: Normal  She exhibits no tenderness and no edema  Left lower leg: Normal  She exhibits no tenderness and no edema  Neurological: She is alert and oriented to person, place, and time  Skin: Skin is warm and dry  Capillary refill takes less than 2 seconds  Psychiatric: She has a normal mood and affect  Her behavior is normal    Nursing note and vitals reviewed        Vital Signs  ED Triage Vitals [07/22/20 1402]   Temperature Pulse Respirations Blood Pressure SpO2   100 5 °F (38 1 °C) 95 18 131/62 97 %      Temp Source Heart Rate Source Patient Position - Orthostatic VS BP Location FiO2 (%)   Temporal Monitor Lying Left arm --      Pain Score       --           Vitals:    07/22/20 1402   BP: 131/62   Pulse: 95   Patient Position - Orthostatic VS: Lying         Visual Acuity      ED Medications  Medications - No data to display    Diagnostic Studies  Results Reviewed     None                 No orders to display              Procedures  ECG 12 Lead Documentation Only  Date/Time: 7/22/2020 2:24 PM  Performed by: Bina Greco MD  Authorized by: Bina Greco MD     Indications / Diagnosis:  SOB  ECG reviewed by me, the ED Provider: yes    Patient location:  ED  Previous ECG:     Previous ECG:  Unavailable  Interpretation:     Interpretation: normal    Rate:     ECG rate:  85    ECG rate assessment: normal    Rhythm:     Rhythm: sinus rhythm    Ectopy:     Ectopy: none    QRS:     QRS axis:  Normal  Conduction:     Conduction: normal    ST segments:     ST segments:  Normal  T waves:     T waves: normal               ED Course       US AUDIT      Most Recent Value   Initial Alcohol Screen: US AUDIT-C    1  How often do you have a drink containing alcohol?  0 Filed at: 07/22/2020 1403   2  How many drinks containing alcohol do you have on a typical day you are drinking? 0 Filed at: 07/22/2020 1403   3b  FEMALE Any Age, or MALE 65+: How often do you have 4 or more drinks on one occassion? 0 Filed at: 07/22/2020 1403   Audit-C Score  0 Filed at: 07/22/2020 1403                  RAUDEL/DAST-10      Most Recent Value   How many times in the past year have you    Used an illegal drug or used a prescription medication for non-medical reasons? Never Filed at: 07/22/2020 1403                                MDM      Disposition  Final diagnoses:   None     ED Disposition     None      Follow-up Information    None         Patient's Medications   Discharge Prescriptions    No medications on file     No discharge procedures on file      PDMP Review     None          ED Provider  Electronically Signed by           Bina Greco MD  07/22/20 1556       Bina Greco MD  08/01/20 3507

## 2020-07-22 NOTE — PATIENT INSTRUCTIONS
Chest xray show no change  Will send you to ER as oxygen is only 91-93% in office today  ER notified of your arrival   You state you feel okay to drive

## 2020-07-22 NOTE — PROGRESS NOTES
[facility]        NAME: Kaci Grimaldo is a 44 y o  female  : 1970    MRN: [id number]  DATE: 2020  TIME: 1:00 PM    Assessment and Plan   Shortness of breath [R06 02]  1  Shortness of breath  XR chest pa & lateral    CANCELED: MISC COVID-19 TEST- Office Collection         Patient Instructions     Patient Instructions   Chest xray show no change  Will send you to ER as oxygen is only 91-93% in office today  ER notified of your arrival   You state you feel okay to drive  Chief Complaint     Chief Complaint   Patient presents with    Shortness of Breath     Patient c/o heaviness in chest, shortness of breath, coughing, and bodyaches  Patient denies fever or exposure to Covid-19  History of Present Illness   Kaci Grimaldo presents to the clinic c/o    This is a 24-year-old female here today with complaints of cough, shortness of breath, body aches  She states symptoms started about 3 days ago  She states she did talk to her PCP several days ago and had blood work and chest x-ray ordered  Chest x-ray was normal at that time  She states last night was awful she states she was very short of breath had a persistent hacking cough in almost went to the ER  She denies any known fevers but does have a low-grade fever here  She denies any exposure to COVID-19  She is working in office setting  She states that she did have a similar episode this several years ago and was told was an allergic asthma  Denies any sore throat, nasal congestion      Review of Systems   Review of Systems   Constitutional: Negative for activity change, chills, fatigue and fever  HENT: Negative for congestion, sinus pressure and sinus pain  Respiratory: Positive for cough, chest tightness and shortness of breath  Cardiovascular: Negative for chest pain  Gastrointestinal: Negative  Skin: Negative  Neurological: Negative  Psychiatric/Behavioral: Negative            Current Medications     Long-Term Medications   Medication Sig Dispense Refill    levocetirizine (Xyzal) 5 MG tablet Take 5 mg by mouth every evening      aspirin 81 mg chewable tablet Chew 81 mg daily      Cholecalciferol 50 MCG (2000 UT) CAPS Take 1 capsule by mouth daily      lisinopril (ZESTRIL) 10 mg tablet       simvastatin (ZOCOR) 40 mg tablet       tamoxifen (NOLVADEX) 20 mg tablet Take 20 mg by mouth daily         Current Allergies     Allergies as of 07/22/2020 - Reviewed 07/22/2020   Allergen Reaction Noted    Bactrim [sulfamethoxazole-trimethoprim]  09/14/2019    Sulfasalazine Rash 12/05/2013            The following portions of the patient's history were reviewed and updated as appropriate: allergies, current medications, past family history, past medical history, past social history, past surgical history and problem list     Objective   /80   Temp 99 6 °F (37 6 °C)   Resp 18   SpO2 91% Comment: - 93%       Physical Exam     Physical Exam   Constitutional: She is oriented to person, place, and time  She appears well-developed and well-nourished  Non-toxic appearance  She does not appear ill  No distress  Neck: Normal range of motion  Cardiovascular: Normal rate, regular rhythm and normal heart sounds  Pulmonary/Chest: Effort normal  No accessory muscle usage  Bradypnea noted  No respiratory distress  Decreased air movement,  Neurological: She is alert and oriented to person, place, and time  Psychiatric: She has a normal mood and affect  Her behavior is normal    Nursing note and vitals reviewed      Chest xray: no change

## 2020-07-22 NOTE — ED NOTES
Patient's mom arrived and at bedside - patient remains with monitoring intact and heparin infusing - aware being admitted, but transferred and awaiting transfer information     Lane Ruiz, CHARLENE  07/22/20 1911

## 2020-07-22 NOTE — EMTALA/ACUTE CARE TRANSFER
[facility]  [address] [id number] [phone]  Dept: [phone]      EMTALA TRANSFER CONSENT    NAME Radha GANT 1970                              MRN [id number]    I have been informed of my rights regarding examination, treatment, and transfer   by Dr Emily Guevara MD    Benefits: Specialized equipment and/or services available at the receiving facility (Include comment)________________________    Risks: Potential for delay in receiving treatment, Loss of IV, Possible worsening of condition or death during transfer, Increased discomfort during transfer, Potential deterioration of medical condition      Transfer Request   I acknowledge that my medical condition has been evaluated and explained to me by the emergency department physician or other qualified medical person and/or my attending physician who has recommended and offered to me further medical examination and treatment  I understand the Hospital's obligation with respect to the treatment and stabilization of my emergency medical condition  I nevertheless request to be transferred  I release the Hospital, the doctor, and any other persons caring for me from all responsibility or liability for any injury or ill effects that may result from my transfer and agree to accept all responsibility for the consequences of my choice to transfer, rather than receive stabilizing treatment at the Hospital  I understand that because the transfer is my request, my insurance may not provide reimbursement for the services  The Hospital will assist and direct me and my family in how to make arrangements for transfer, but the hospital is not liable for any fees charged by the transport service  In spite of this understanding, I refuse to consent to further medical examination and treatment which has been offered to me, and request transfer to     I authorize the performance of emergency medical procedures and treatments upon me in both transit and upon arrival at the receiving facility  Additionally, I authorize the release of any and all medical records to the receiving facility and request they be transported with me, if possible  I authorize the performance of emergency medical procedures and treatments upon me in both transit and upon arrival at the receiving facility  Additionally, I authorize the release of any and all medical records to the receiving facility and request they be transported with me, if possible  I understand that the safest mode of transportation during a medical emergency is an ambulance and that the Hospital advocates the use of this mode of transport  Risks of traveling to the receiving facility by car, including absence of medical control, life sustaining equipment, such as oxygen, and medical personnel has been explained to me and I fully understand them  (ISAIAS CORRECT BOX BELOW)  [  ]  I consent to the stated transfer and to be transported by ambulance/helicopter  [  ]  I consent to the stated transfer, but refuse transportation by ambulance and accept full responsibility for my transportation by car  I understand the risks of non-ambulance transfers and I exonerate the Hospital and its staff from any deterioration in my condition that results from this refusal     X___________________________________________    DATE  20  TIME________  Signature of patient or legally responsible individual signing on patient behalf           RELATIONSHIP TO PATIENT_________________________          Provider Certification    NAME Uma Mares                                        [facility] 1970                              MRN [id number]    A medical screening exam was performed on the above named patient    Based on the examination:    Condition Necessitating Transfer The primary encounter diagnosis was Pulmonary infarct [facility])  A diagnosis of Pulmonary embolism and infarction [facility]) was also pertinent to this visit  Patient Condition: The patient has been stabilized such that within reasonable medical probability, no material deterioration of the patient condition or the condition of the unborn child(kathy) is likely to result from the transfer    Reason for Transfer: Level of Care needed not available at this facility    Transfer Requirements: Facility     · Space available and qualified personnel available for treatment as acknowledged by    · Agreed to accept transfer and to provide appropriate medical treatment as acknowledged by       Dr Marco Daniel  · Appropriate medical records of the examination and treatment of the patient are provided at the time of transfer   [address] [facility], [address] _______  · Transfer will be performed by qualified personnel from    and appropriate transfer equipment as required, including the use of necessary and appropriate life support measures      Provider Certification: I have examined the patient and explained the following risks and benefits of being transferred/refusing transfer to the patient/family:  General risk, such as traffic hazards, adverse weather conditions, rough terrain or turbulence, possible failure of equipment (including vehicle or aircraft), or consequences of actions of persons outside the control of the transport personnel, Unanticipated needs of medical equipment and personnel during transport, Risk of worsening condition      Based on these reasonable risks and benefits to the patient and/or the unborn child(kathy), and based upon the information available at the time of the patients examination, I certify that the medical benefits reasonably to be expected from the provision of appropriate medical treatments at another medical facility outweigh the increasing risks, if any, to the individuals medical condition, and in the case of labor to the unborn child, from effecting the transfer      X____________________________________________ DATE 07/22/20        TIME_______      ORIGINAL - SEND TO MEDICAL RECORDS   COPY - SEND WITH PATIENT DURING TRANSFER

## 2020-07-23 ENCOUNTER — APPOINTMENT (INPATIENT)
Dept: NON INVASIVE DIAGNOSTICS | Facility: HOSPITAL | Age: 50
DRG: 176 | End: 2020-07-23
Payer: COMMERCIAL

## 2020-07-23 LAB
APTT PPP: 113 SECONDS (ref 23–37)
APTT PPP: 75 SECONDS (ref 23–37)
ATRIAL RATE: 85 BPM
BASOPHILS # BLD AUTO: 0.06 THOUSANDS/ΜL (ref 0–0.1)
BASOPHILS NFR BLD AUTO: 1 % (ref 0–1)
DEPRECATED AT III PPP: 85 % OF NORMAL (ref 92–136)
EOSINOPHIL # BLD AUTO: 0.06 THOUSAND/ΜL (ref 0–0.61)
EOSINOPHIL NFR BLD AUTO: 1 % (ref 0–6)
ERYTHROCYTE [DISTWIDTH] IN BLOOD BY AUTOMATED COUNT: 12.9 % (ref 11.6–15.1)
GLUCOSE SERPL-MCNC: 105 MG/DL (ref 65–140)
HCT VFR BLD AUTO: 35.1 % (ref 34.8–46.1)
HGB BLD-MCNC: 11.3 G/DL (ref 11.5–15.4)
IMM GRANULOCYTES # BLD AUTO: 0.03 THOUSAND/UL (ref 0–0.2)
IMM GRANULOCYTES NFR BLD AUTO: 0 % (ref 0–2)
LYMPHOCYTES # BLD AUTO: 2.2 THOUSANDS/ΜL (ref 0.6–4.47)
LYMPHOCYTES NFR BLD AUTO: 22 % (ref 14–44)
MCH RBC QN AUTO: 27.8 PG (ref 26.8–34.3)
MCHC RBC AUTO-ENTMCNC: 32.2 G/DL (ref 31.4–37.4)
MCV RBC AUTO: 87 FL (ref 82–98)
MONOCYTES # BLD AUTO: 0.88 THOUSAND/ΜL (ref 0.17–1.22)
MONOCYTES NFR BLD AUTO: 9 % (ref 4–12)
NEUTROPHILS # BLD AUTO: 6.88 THOUSANDS/ΜL (ref 1.85–7.62)
NEUTS SEG NFR BLD AUTO: 67 % (ref 43–75)
NRBC BLD AUTO-RTO: 0 /100 WBCS
NT-PROBNP SERPL-MCNC: 113 PG/ML
P AXIS: 67 DEGREES
PLATELET # BLD AUTO: 220 THOUSANDS/UL (ref 149–390)
PMV BLD AUTO: 9.8 FL (ref 8.9–12.7)
PR INTERVAL: 138 MS
PROT C AG ACT/NOR PPP IA: 85 % OF NORMAL (ref 60–150)
QRS AXIS: 49 DEGREES
QRSD INTERVAL: 72 MS
QT INTERVAL: 356 MS
QTC INTERVAL: 423 MS
RBC # BLD AUTO: 4.06 MILLION/UL (ref 3.81–5.12)
T WAVE AXIS: 47 DEGREES
VENTRICULAR RATE: 85 BPM
WBC # BLD AUTO: 10.11 THOUSAND/UL (ref 4.31–10.16)

## 2020-07-23 PROCEDURE — 85730 THROMBOPLASTIN TIME PARTIAL: CPT | Performed by: INTERNAL MEDICINE

## 2020-07-23 PROCEDURE — 85025 COMPLETE CBC W/AUTO DIFF WBC: CPT | Performed by: INTERNAL MEDICINE

## 2020-07-23 PROCEDURE — 99232 SBSQ HOSP IP/OBS MODERATE 35: CPT | Performed by: INTERNAL MEDICINE

## 2020-07-23 PROCEDURE — 93970 EXTREMITY STUDY: CPT | Performed by: SURGERY

## 2020-07-23 PROCEDURE — 93970 EXTREMITY STUDY: CPT

## 2020-07-23 PROCEDURE — 99253 IP/OBS CNSLTJ NEW/EST LOW 45: CPT | Performed by: NURSE PRACTITIONER

## 2020-07-23 PROCEDURE — 82948 REAGENT STRIP/BLOOD GLUCOSE: CPT

## 2020-07-23 PROCEDURE — 93306 TTE W/DOPPLER COMPLETE: CPT | Performed by: INTERNAL MEDICINE

## 2020-07-23 PROCEDURE — 83880 ASSAY OF NATRIURETIC PEPTIDE: CPT | Performed by: RADIOLOGY

## 2020-07-23 PROCEDURE — 93306 TTE W/DOPPLER COMPLETE: CPT

## 2020-07-23 PROCEDURE — 93010 ELECTROCARDIOGRAM REPORT: CPT | Performed by: INTERNAL MEDICINE

## 2020-07-23 RX ADMIN — PRAVASTATIN SODIUM 80 MG: 80 TABLET ORAL at 16:42

## 2020-07-23 RX ADMIN — TAMOXIFEN CITRATE 20 MG: 10 TABLET, FILM COATED ORAL at 08:21

## 2020-07-23 RX ADMIN — HEPARIN SODIUM AND DEXTROSE 15 UNITS/KG/HR: 10000; 5 INJECTION INTRAVENOUS at 16:44

## 2020-07-23 RX ADMIN — ASPIRIN 81 MG 81 MG: 81 TABLET ORAL at 08:21

## 2020-07-23 RX ADMIN — LISINOPRIL 10 MG: 10 TABLET ORAL at 08:23

## 2020-07-23 RX ADMIN — MELATONIN 2000 UNITS: at 08:21

## 2020-07-23 RX ADMIN — LORATADINE 10 MG: 10 TABLET ORAL at 08:21

## 2020-07-23 RX ADMIN — ACETAMINOPHEN 650 MG: 325 TABLET, FILM COATED ORAL at 20:56

## 2020-07-23 RX ADMIN — ACETAMINOPHEN 650 MG: 325 TABLET, FILM COATED ORAL at 08:26

## 2020-07-23 RX ADMIN — APIXABAN 10 MG: 5 TABLET, FILM COATED ORAL at 18:22

## 2020-07-23 NOTE — ASSESSMENT & PLAN NOTE
Status post right lumpectomy in April 2019 w/ sentinel node biopsy for infiltrating ductal carcinoma - s/p bilateral mastectomy in July 2019  Per [facility] plastic surgery documentation earlier this year on 3/19/20: "1) right breast Extensive Formal Capsulectomy  2) right breast revisional mammoplasty with attention toward breast mound remodeling and reshaping and adjustment of the lateral breast pocket with extensive capsuloraphy   3) right breast autologous fat grafting with utilzation of the REVOLVE fat transfer system  4) Suction Assisted Lipectomy (SAL) of 6 separate distinct anatomic regions inclusive of right abdomen, right flank, right hip, left abdomen, left hip, and left flank  5) Left breast Extensive Formal Capsulectomy  6) Left breast revisional mammoplasty with attention toward breast mound remodeling and reshaping and adjustment of the lateral breast pocket with extensive capsuloraphy   7) Left breast autologous fat grafting with utilzation of the REVOLVE fat transfer system"    Continue daily Tamoxifen  Outpatient follow-up with oncology

## 2020-07-23 NOTE — H&P
History & Physical - [facility] Internal Medicine  Patient: Nicole Villarreal 44 y o  female MRN: [id number]  Unit/Bed#: [facility] 710-01 Encounter: [id number]  Primary Care Provider: Sujatha Velasco MD  Date & Time of Admission: 7/22/2020  9:50 PM        Assessment & Plan:    * Bilateral pulmonary emboli  Assessment & Plan  CT angiogram of chest today "consistent with bilateral lower lobe segmental/subsegmental pulmonary emboli with overall moderate clot burden    Somewhat wedge-shaped opacity in the right lower lobe is likely secondary to pulmonary infarct"  Continue heparin drip -> check echocardiogram to assess for RV strain  Monitor vitals and maintain hemodynamics  Baseline troponin normal  Await IR-evaluation regarding thrombolysis/thrombectomy  Etiology likely secondary to history of breast cancer s/p surgical intervention earlier this year - thrombosis panel pending  Maintain oxygenation as necessary   PRN pain control    Breast cancer   Assessment & Plan  Status post right lumpectomy in April 2019 w/ sentinel node biopsy for infiltrating ductal carcinoma - s/p bilateral mastectomy in July 2019  Per [facility] plastic surgery documentation earlier this year on 3/19/20: "1) right breast Extensive Formal Capsulectomy  2) right breast revisional mammoplasty with attention toward breast mound remodeling and reshaping and adjustment of the lateral breast pocket with extensive capsuloraphy   3) right breast autologous fat grafting with utilzation of the REVOLVE fat transfer system  4) Suction Assisted Lipectomy (SAL) of 6 separate distinct anatomic regions inclusive of right abdomen, right flank, right hip, left abdomen, left hip, and left flank  5) Left breast Extensive Formal Capsulectomy  6) Left breast revisional mammoplasty with attention toward breast mound remodeling and reshaping and adjustment of the lateral breast pocket with extensive capsuloraphy   7) Left breast autologous fat grafting with utilzation of the REVOLVE fat transfer system"    Continue daily Tamoxifen  Outpatient follow-up with oncology    Essential hypertension  Assessment & Plan  Low-sodium diet  Continue Lisinopril - PRN IV Hydralazine for BP spikes    Hyperlipidemia  Assessment & Plan  Continue ASA/statin      DVT Prophylaxis:  Heparin drip    Code Status:  Full code    Discussion with:  Patient at bedside    Anticipated Length of Stay:  Patient will be admitted on an Inpatient basis with an anticipated length of stay of greater than 2 midnights  Justification for Hospital Stay: Bilateral pulmonary emboli requiring IV heparin along with IR evaluation for possible thrombolysis/thrombectomy  Total Time for Visit, including Counseling / Coordination of Care: 72 minutes  Greater than 50% of this total time spent on direct patient counseling and coordination of care  Chief Complaint:  Shortness of breath      History of Present Illness:    Uma Mares is a 44 y o  female who presents as a transfer from the [facility] where she presented earlier today with complaints of chest heaviness and shortness of breath  Notably, she has a history of breast cancer status post bilateral mastectomies and subsequent reconstruction earlier this year  She underwent a CT angiogram of the chest which revealed evidence of bilateral pulmonary emboli with moderate clot burden along with a right lower lobe pulmonary infarct  COVID-19 testing was negative  She was initiated on a heparin drip  She has been transferred here to the [facility] for interventional radiology evaluation regarding possible thrombectomy or thrombolysis  Upon my encounter post arrival, she sitting upright in bed stating that leaning forward alleviates some of the discomfort leaning backwards and lying flat increases her chest discomfort  She denies any calf tenderness  Denies any known family history of clotting disorders    Overall, she remains in pleasant and hopeful spirits  Review of Systems:    Review of Systems - A thorough 12 point review systems was conducted  Pertinent positives and negatives are mentioned in the history of present illness  Past Medical and Surgical History:     Past Medical History:   Diagnosis Date    Allergic rhinitis     Breast CA ([address] )     IgA nephropathy        Past Surgical History:   Procedure Laterality Date    BREAST BIOPSY Left     l breast    IR IMAGE GUIDED BIOPSY/ASPIRATION KIDNEY  12/19/2018    MASTECTOMY Bilateral 07/25/2019         Medications & Allergies:    Prior to Admission medications    Medication Sig Start Date End Date Taking? Authorizing Provider   aspirin 81 mg chewable tablet Chew 81 mg daily    Historical Provider, MD   Cholecalciferol 50 MCG (2000 UT) CAPS Take 1 capsule by mouth daily    Historical Provider, MD   levocetirizine (Xyzal) 5 MG tablet Take 5 mg by mouth every evening    Historical Provider, MD   lisinopril (ZESTRIL) 10 mg tablet  7/9/19   Historical Provider, MD   simvastatin (ZOCOR) 40 mg tablet  7/9/19   Historical Provider, MD   tamoxifen (NOLVADEX) 20 mg tablet Take 20 mg by mouth daily    Historical Provider, MD         Allergies: Allergies   Allergen Reactions    Bactrim [Sulfamethoxazole-Trimethoprim]     Sulfasalazine Rash         Social History:    Substance Use History:   Social History     Substance and Sexual Activity   Alcohol Use Yes    Comment: socially     Social History     Tobacco Use   Smoking Status Never Smoker   Smokeless Tobacco Never Used     Social History     Substance and Sexual Activity   Drug Use No         Family History:    Significant for various cancers in various family members        Physical Exam:     Vitals:   Blood Pressure: 120/79 (07/22/20 2210)  Pulse: 82 (07/22/20 2210)  Temperature: 99 5 °F (37 5 °C) (07/22/20 2210)  Temp Source: Oral (07/22/20 2210)  Respirations: 20 (07/22/20 2210)  Height: 5' 3" (160 cm) (07/22/20 2210)  Weight - Scale: 71 1 kg (156 lb 12 oz) (07/22/20 2210)  SpO2: 98 % (07/22/20 2210)      GENERAL:  Well-developed/nourished   HEAD:  Normocephalic - atraumatic  EYES: PERRL - EOMI   MOUTH:  Mucosa moist  NECK:  Supple - full range of motion  CARDIAC:  Rate controlled - S1/S2 positive  PULMONARY:  Fairly clear to auscultation - nonlabored respirations our  ABDOMEN:  Soft - nontender/nondistended - active bowel sounds  MUSCULOSKELETAL:  Motor strength/range of motion intact - no calf tenderness  NEUROLOGIC:  Alert/oriented at baseline  SKIN:  Chronic wrinkles/blemishes   PSYCHIATRIC:  Mood/affect pleasant      Additional Data:     Labs & Recent Cultures:    Results from last 7 days   Lab Units 07/22/20  1430   WBC Thousand/uL 10 50   HEMOGLOBIN g/dL 12 6   HEMATOCRIT % 37 1*   PLATELETS Thousands/uL 222   NEUTROS PCT % 77*   LYMPHS PCT % 15*   MONOS PCT % 7   EOS PCT % 0     Results from last 7 days   Lab Units 07/22/20  1430   SODIUM mmol/L 139   POTASSIUM mmol/L 3 8   CHLORIDE mmol/L 103   CO2 mmol/L 25   BUN mg/dL 11   CREATININE mg/dL 1 15   ANION GAP mmol/L 11   CALCIUM mg/dL 8 8   ALBUMIN g/dL 3 8   TOTAL BILIRUBIN mg/dL 0 50   ALK PHOS U/L 44   ALT U/L 11   AST U/L 12*   GLUCOSE RANDOM mg/dL 98     Results from last 7 days   Lab Units 07/22/20  1716   INR  1 16             Results from last 7 days   Lab Units 07/22/20  1430   PROCALCITONIN ng/ml <0 05                 Imaging:     Xr Chest Pa & Lateral    Result Date: 7/22/2020  Narrative: CHEST INDICATION:   R06 02: Shortness of breath  COMPARISON:  7/17/2020 EXAM PERFORMED/VIEWS:  XR CHEST PA & LATERAL FINDINGS: Cardiomediastinal silhouette appears unremarkable  The lungs are clear  No pneumothorax or pleural effusion  Osseous structures appear within normal limits for patient age  Impression: No acute cardiopulmonary disease   Workstation performed: [id number]         Stefano Cunha Ed Chest Pe Study    Result Date: 7/22/2020  Narrative: CTA - CHEST WITH IV CONTRAST - PULMONARY ANGIOGRAM INDICATION:   PE suspected, intermediate prob, positive D-dimer  COMPARISON: None  TECHNIQUE: CTA examination of the chest was performed using angiographic technique according to a protocol specifically tailored to evaluate for pulmonary embolism  Axial, sagittal, and coronal 2D reformatted images were created from the source data and  submitted for interpretation  In addition, coronal 3D MIP postprocessing was performed on the acquisition scanner  Radiation dose length product (DLP) for this visit:  149 7 mGy-cm   This examination, like all CT scans performed in the [facility], was performed utilizing techniques to minimize radiation dose exposure, including the use of iterative reconstruction and automated exposure control  IV Contrast:  100 mL of iohexol (OMNIPAQUE)  FINDINGS: PULMONARY ARTERIAL TREE:  There are filling defects in segmental and subsegmental pulmonary arteries supplying bilateral lower lobes consistent with pulmonary emboli  LUNGS:  Somewhat wedge-shaped opacity in the right lower lobe is likely secondary to pulmonary infarct  There is no tracheal or endobronchial lesion  PLEURA:  Unremarkable  HEART/GREAT VESSELS:  Unremarkable for patient's age  MEDIASTINUM AND BRANNON:  Unremarkable  CHEST WALL AND LOWER NECK:   Partially visualized bilateral breast prostheses  VISUALIZED STRUCTURES IN THE UPPER ABDOMEN:  Unremarkable  OSSEOUS STRUCTURES:  No acute fracture or destructive osseous lesion  Impression: Findings consistent with bilateral lower lobe segmental/subsegmental pulmonary emboli with overall moderate clot burden  Somewhat wedge-shaped opacity in the right lower lobe is likely secondary to pulmonary infarct  I personally discussed this study with Karyle Graces on 7/22/2020 at 4:45 PM  Workstation performed: [id number]                 ** Please Note: This note is constructed using a voice recognition dictation system    An occasional wrong word/phrase or sound-a-like substitution may have been picked up by dictation device due to the inherent limitations of voice recognition software  Read the chart carefully and recognize, using reasonable context, where substitutions may have occurred  **

## 2020-07-23 NOTE — ED NOTES
Received phone call that [facility] will be her around 8:15pm to transfer patient     Ayan Yi RN  07/22/20 2004

## 2020-07-23 NOTE — UTILIZATION REVIEW
Initial Clinical Review    Admission: Date/Time/Statement: Admission Orders (From admission, onward)     Ordered        07/22/20 2207  Inpatient Admission  Once                   Orders Placed This Encounter   Procedures    Inpatient Admission     Standing Status:   Standing     Number of Occurrences:   1     Order Specific Question:   Admitting Physician     Answer:   Prema Spann [[id number]]     Order Specific Question:   Level of Care     Answer:   Level 2 Stepdown / HOT [14]     Order Specific Question:   Estimated length of stay     Answer:   More than 2 Midnights     Order Specific Question:   Certification     Answer:   I certify that inpatient services are medically necessary for this patient for a duration of greater than two midnights  See H&P and MD Progress Notes for additional information about the patient's course of treatment  Assessment/Plan: 44year old female directly admitted to [facility] INpatient step down level of care from [facility] for bilateral PE with right lower lobe pulmonary infarct  Transferred to [facility] for interventional radiology evaluation  She had been experiencing chest heaviness and shortness of breath  H/O breast CA s/p bilateral mastectomies, IG A nephropathy, DM, prior h/o DVT,PE  COVID neg  Lungs are clear  NSR of monitor  Thrombosis panel pending  Troponin neg  IR consult  Heparin drip started  7/23 IR consult: She has pain under right axilla, right sided ribs, able to position herself so it doesn't hurt  Continue Heparin drip        Temperature Pulse Respirations Blood Pressure SpO2   07/22/20 2210 07/22/20 2210 07/22/20 2210 07/22/20 2210 07/22/20 2210   99 5 °F (37 5 °C) 82 20 120/79 98 %      Temp Source Heart Rate Source Patient Position - Orthostatic VS BP Location FiO2 (%)   07/22/20 2210 07/22/20 2210 07/22/20 2210 07/22/20 2210 --   Oral Monitor Lying Right arm       Pain Score       07/22/20 2211       No Pain Wt Readings from Last 1 Encounters:   07/22/20 71 1 kg (156 lb 12 oz)     Additional Vital Signs:   Date/Time  Temp  Pulse  Resp  BP  MAP (mmHg)  SpO2  O2 Device  Patient Position - Orthostatic VS   07/23/20 08:23:29  98 8 °F (37 1 °C)  89  20  124/81  95  97 %  --  --   07/23/20 0823  --  --  --  124/81  --  --  --  --   07/22/20 2230  --  --  --  --  --  97 %  None (Room air)  --   07/22/20 22:10:26  99 5 °F (37 5 °C)  82  20  120/79             Pertinent Labs/Diagnostic Test Results:   7/22 EKG: Normal sinus rhythm  Normal ECG  No previous ECGs available  7/22 CT C/A/P:   Findings consistent with bilateral lower lobe segmental/subsegmental pulmonary emboli with overall moderate clot burden   Somewhat wedge-shaped opacity in the right lower lobe is likely secondary to pulmonary infarct      Results from last 7 days   Lab Units 07/22/20  1430   SARS-COV-2  Negative     Results from last 7 days   Lab Units 07/23/20  0454 07/22/20  1430 07/17/20  1208   WBC Thousand/uL 10 11 10 50 6 70   HEMOGLOBIN g/dL 11 3* 12 6 13 5   HEMATOCRIT % 35 1 37 1* 39 9*   PLATELETS Thousands/uL 220 222 221   NEUTROS ABS Thousands/µL 6 88 8 10* 4 40         Results from last 7 days   Lab Units 07/22/20  1430 07/17/20  1208   SODIUM mmol/L 139 143   POTASSIUM mmol/L 3 8 3 7   CHLORIDE mmol/L 103 106   CO2 mmol/L 25 28   ANION GAP mmol/L 11 9   BUN mg/dL 11 8   CREATININE mg/dL 1 15 1 08   EGFR ml/min/1 73sq m 55 59   CALCIUM mg/dL 8 8 9 3     Results from last 7 days   Lab Units 07/22/20  1430 07/17/20  1208   AST U/L 12* 18   ALT U/L 11 17   ALK PHOS U/L 44 45   TOTAL PROTEIN g/dL 7 0 7 5   ALBUMIN g/dL 3 8 4 1   TOTAL BILIRUBIN mg/dL 0 50 0 40         Results from last 7 days   Lab Units 07/22/20  1430   GLUCOSE RANDOM mg/dL 98       Results from last 7 days   Lab Units 07/22/20  1430   TROPONIN I ng/mL <0 03     Results from last 7 days   Lab Units 07/22/20  1431   D-DIMER QUANTITATIVE ug/ml FEU 1 37*     Results from last 7 days Lab Units 07/23/20  0428 07/22/20  2244 07/22/20  1716   PROTIME seconds  --   --  14 7*   INR   --   --  1 16   PTT seconds 113* 100* 34     Results from last 7 days   Lab Units 07/17/20  1208   TSH 3RD GENERATON uIU/mL 2 490     Results from last 7 days   Lab Units 07/22/20  1430   PROCALCITONIN ng/ml <0 05       Results from last 7 days   Lab Units 07/23/20  0826   NT-PRO BNP pg/mL 113         Results from last 7 days   Lab Units 07/17/20  1208   CLARITY UA  Slightly Cloudy*   COLOR UA  Yellow   SPEC GRAV UA  1 010   PH UA  5 5   GLUCOSE UA mg/dl Negative   KETONES UA mg/dl Negative   BLOOD UA  2+*   PROTEIN UA mg/dl Negative   NITRITE UA  Negative   BILIRUBIN UA  Negative   UROBILINOGEN UA E U /dl 0 2   LEUKOCYTES UA  1+*   WBC UA /hpf 2-4*   RBC UA /hpf 0-1*   BACTERIA UA /hpf None Seen   EPITHELIAL CELLS WET PREP /hpf Occasional           Past Medical History:   Diagnosis Date    Allergic rhinitis     Breast CA (HCC)     IgA nephropathy          Admitting Diagnosis: Bilateral pulmonary embolism (HCC) [I26 99]  Pulmonary infarct ([address] ) [I26 99]  Age/Sex: 44 y o  female  Admission Orders:  IR consult  PTT routine with heparin infusion  CBC, thrombosis panel,  Cardiolipin , factore 5 leiden, lupus, protein s, prothrombin gen mutation, beta 2 glycoprotein antibodie  ECHO  Scheduled Medications:    Medications:  aspirin 81 mg Oral Daily   cholecalciferol 2,000 Units Oral Daily   lisinopril 10 mg Oral Daily   loratadine 10 mg Oral Daily   pravastatin 80 mg Oral Daily With Dinner   tamoxifen 20 mg Oral Daily     Continuous IV Infusions:    heparin (porcine) 3-30 Units/kg/hr (Order-Specific) Intravenous Titrated     PRN Meds:    acetaminophen 650 mg Oral Q6H PRN   hydrALAZINE 5 mg Intravenous Q6H PRN   morphine injection 4 mg Intravenous Q4H PRN   morphine injection 2 mg Intravenous Q4H PRN   ondansetron 4 mg Intravenous Q4H PRN       Network Utilization Review Department  [email or web address]  ATTENTION: Please call with any questions or concerns to [phone] and carefully listen to the prompts so that you are directed to the right person  All voicemails are confidential   Marcelle Lambert all requests for admission clinical reviews, approved or denied determinations and any other requests to dedicated fax number below belonging to the campus where the patient is receiving treatment   List of dedicated fax numbers for the Facilities:  [address] DENIALS (Administrative/Medical Necessity) [phone]   [address] (Maternity/NICU/Pediatrics) [phone]   Sascha Seen [phone]   Yaquelin Abraham [phone]   [address] [place] [address] [phone]   [address] [place] [address]  [phone]   [address] [place] [address] [phone]   [facility]  [phone]   [address] [place] [address], [address]  [address] [facility] [place] [address] [facility] [phone]

## 2020-07-23 NOTE — ASSESSMENT & PLAN NOTE
CT angiogram of chest today "consistent with bilateral lower lobe segmental/subsegmental pulmonary emboli with overall moderate clot burden    Somewhat wedge-shaped opacity in the right lower lobe is likely secondary to pulmonary infarct"  Continue heparin drip -> check echocardiogram to assess for RV strain  Monitor vitals and maintain hemodynamics  Baseline troponin normal  Await IR-evaluation regarding thrombolysis/thrombectomy  Etiology likely secondary to history of breast cancer s/p surgical intervention earlier this year - thrombosis panel pending  Maintain oxygenation as necessary   PRN pain control

## 2020-07-23 NOTE — PROGRESS NOTES
Progress Note - Kevin Schmidt 1970, 44 y o  female MRN: [id number]    Unit/Bed#: [facility] 710-01 Encounter: [id number]    Primary Care Provider: Nicol Hair MD   Date and time admitted to hospital: 7/22/2020  9:50 PM        Hyperlipidemia  Assessment & Plan  Continue ASA/statin    Essential hypertension  Assessment & Plan  Low-sodium diet  Continue Lisinopril - PRN IV Hydralazine for BP spikes    Breast cancer   Assessment & Plan  Status post right lumpectomy in April 2019 w/ sentinel node biopsy for infiltrating ductal carcinoma - s/p bilateral mastectomy in July 2019  Per [facility] plastic surgery documentation earlier this year on 3/19/20: "1) right breast Extensive Formal Capsulectomy  2) right breast revisional mammoplasty with attention toward breast mound remodeling and reshaping and adjustment of the lateral breast pocket with extensive capsuloraphy   3) right breast autologous fat grafting with utilzation of the REVOLVE fat transfer system  4) Suction Assisted Lipectomy (SAL) of 6 separate distinct anatomic regions inclusive of right abdomen, right flank, right hip, left abdomen, left hip, and left flank  5) Left breast Extensive Formal Capsulectomy  6) Left breast revisional mammoplasty with attention toward breast mound remodeling and reshaping and adjustment of the lateral breast pocket with extensive capsuloraphy   7) Left breast autologous fat grafting with utilzation of the REVOLVE fat transfer system"    Continue daily Tamoxifen  Outpatient follow-up with oncology    * Bilateral pulmonary emboli  Assessment & Plan  CT angiogram of chest  "consistent with bilateral lower lobe segmental/subsegmental pulmonary emboli with overall moderate clot burden    Somewhat wedge-shaped opacity in the right lower lobe is likely secondary to pulmonary infarct"  Echocardiogram was unremarkable  Lower extremity Dopplers negative for residual DVT  No indication for thrombolysis per interventional radiology  Will transition IV heparin to Eliquis this evening  Monitor overnight for stability  Check ambulatory pulse oximetry tomorrow  thrombosis panel pending        VTE Pharmacologic Prophylaxis:   Pharmacologic: Heparin Drip  Mechanical VTE Prophylaxis in Place: Yes    Patient Centered Rounds: I have performed bedside rounds with nursing staff today  Education and Discussions with Family / Patient:  Patient, plan of care    Time Spent for Care: 20 minutes  More than 50% of total time spent on counseling and coordination of care as described above  Current Length of Stay: 1 day(s)    Current Patient Status: Inpatient   Certification Statement: The patient will continue to require additional inpatient hospital stay due to Monitor for stability post pulmonary embolism    Discharge Plan:  Home possibly tomorrow    Code Status: Level 1 - Full Code      Subjective:   Continues to have chest heaviness, pain but has improved since admission  Objective:     Vitals:   Temp (24hrs), Av 3 °F (37 4 °C), Min:98 8 °F (37 1 °C), Max:99 6 °F (37 6 °C)    Temp:  [98 8 °F (37 1 °C)-99 6 °F (37 6 °C)] 99 6 °F (37 6 °C)  HR:  [71-89] 85  Resp:  [18-20] 18  BP: (120-145)/(70-87) 129/75  SpO2:  [97 %-99 %] 99 %  Body mass index is 27 77 kg/m²  Input and Output Summary (last 24 hours): Intake/Output Summary (Last 24 hours) at 2020 1727  Last data filed at 2020 1124  Gross per 24 hour   Intake 720 ml   Output --   Net 720 ml       Physical Exam:     Physical Exam   Constitutional: She is oriented to person, place, and time  She appears well-developed and well-nourished  HENT:   Head: Normocephalic and atraumatic  Eyes: EOM are normal    Neck: Neck supple  Cardiovascular: Normal rate and regular rhythm  Pulmonary/Chest: Effort normal  She has no wheezes  She has no rales  Musculoskeletal: She exhibits no edema  Neurological: She is alert and oriented to person, place, and time     Skin: Skin is warm and dry    Psychiatric: She has a normal mood and affect  Her behavior is normal        Additional Data:     Labs:    Results from last 7 days   Lab Units 07/23/20  0454   WBC Thousand/uL 10 11   HEMOGLOBIN g/dL 11 3*   HEMATOCRIT % 35 1   PLATELETS Thousands/uL 220   NEUTROS PCT % 67   LYMPHS PCT % 22   MONOS PCT % 9   EOS PCT % 1     Results from last 7 days   Lab Units 07/22/20  1430   SODIUM mmol/L 139   POTASSIUM mmol/L 3 8   CHLORIDE mmol/L 103   CO2 mmol/L 25   BUN mg/dL 11   CREATININE mg/dL 1 15   ANION GAP mmol/L 11   CALCIUM mg/dL 8 8   ALBUMIN g/dL 3 8   TOTAL BILIRUBIN mg/dL 0 50   ALK PHOS U/L 44   ALT U/L 11   AST U/L 12*   GLUCOSE RANDOM mg/dL 98     Results from last 7 days   Lab Units 07/22/20  1716   INR  1 16             Results from last 7 days   Lab Units 07/22/20  1430   PROCALCITONIN ng/ml <0 05           * I Have Reviewed All Lab Data Listed Above  * Additional Pertinent Lab Tests Reviewed:  All Labs Within Last 24 Hours Reviewed    Echocardiogram reviewed  Lower extremity venous duplex reviewed    Recent Cultures (last 7 days):           Last 24 Hours Medication List:     Current Facility-Administered Medications:  acetaminophen 650 mg Oral Q6H PRN Clair Rabago MD    apixaban 10 mg Oral BID Unknown MD Mildred    Followed by        Kaleigh Stevens ON 7/30/2020] apixaban 5 mg Oral BID Unknown MD Mildred    aspirin 81 mg Oral Daily Clair Rabago MD    cholecalciferol 2,000 Units Oral Daily Clair Rabago MD    heparin (porcine) 3-30 Units/kg/hr (Order-Specific) Intravenous Titrated Unknown MD Mildred Last Rate: 15 Units/kg/hr (07/23/20 1644)   hydrALAZINE 5 mg Intravenous Q6H PRN Clair Rabago MD    lisinopril 10 mg Oral Daily Clair Rabago MD    loratadine 10 mg Oral Daily Clair Rabago MD    morphine injection 4 mg Intravenous Q4H PRN Clair Rabago MD    morphine injection 2 mg Intravenous Q4H PRN Clair Rabago MD    ondansetron 4 mg Intravenous Q4H PRN Clair Rabago MD    pravastatin 80 mg Oral Daily With Talon Colbert MD    tamoxifen 20 mg Oral Daily Robinson Tan MD         Today, Patient Was Seen By: Marci Terrazas MD    ** Please Note: Dictation voice to text software may have been used in the creation of this document   **

## 2020-07-23 NOTE — ASSESSMENT & PLAN NOTE
Status post right lumpectomy in April 2019 w/ sentinel node biopsy for infiltrating ductal carcinoma - s/p bilateral mastectomy in July 2019  Per [facility] plastic surgery documentation earlier this year on 3/19/20: "1) right breast Extensive Formal Capsulectomy  2) right breast revisional mammoplasty with attention toward breast mound remodeling and reshaping and adjustment of the lateral breast pocket with extensive capsuloraphy   3) right breast autologous fat grafting with utilzation of the REVOLVE fat transfer system  4) Suction Assisted Lipectomy (SAL) of 6 separate distinct anatomic regions inclusive of right abdomen, right flank, right hip, left abdomen, left hip, and left flank  5) Left breast Extensive Formal Capsulectomy  6) Left breast revisional mammoplasty with attention toward breast mound remodeling and reshaping and adjustment of the lateral breast pocket with extensive capsuloraphy   7) Left breast autologous fat grafting with utilzation of the REVOLVE fat transfer system"    Continue daily Tamoxifen  Outpatient follow-up with oncology

## 2020-07-23 NOTE — PLAN OF CARE
Problem: PAIN - ADULT  Goal: Verbalizes/displays adequate comfort level or baseline comfort level  Description  Interventions:  - Encourage patient to monitor pain and request assistance  - Assess pain using appropriate pain scale  - Administer analgesics based on type and severity of pain and evaluate response  - Implement non-pharmacological measures as appropriate and evaluate response  - Consider cultural and social influences on pain and pain management  - Notify physician/advanced practitioner if interventions unsuccessful or patient reports new pain  Outcome: Progressing     Problem: INFECTION - ADULT  Goal: Absence or prevention of progression during hospitalization  Description  INTERVENTIONS:  - Assess and monitor for signs and symptoms of infection  - Monitor lab/diagnostic results  - Monitor all insertion sites, i e  indwelling lines, tubes, and drains  - [place] appropriate cooling/warming therapies per order  - Administer medications as ordered  - Instruct and encourage patient and family to use good hand hygiene technique  - Identify and instruct in appropriate isolation precautions for identified infection/condition   Outcome: Progressing  Goal: Absence of fever/infection during neutropenic period  Description  INTERVENTIONS:  - Monitor WBC    Outcome: Progressing     Problem: SAFETY ADULT  Goal: Patient will remain free of falls  Description  INTERVENTIONS:  - Assess patient frequently for physical needs  -  Identify cognitive and physical deficits and behaviors that affect risk of falls    -  [place] fall precautions as indicated by assessment   - Educate patient/family on patient safety including physical limitations  - Instruct patient to call for assistance with activity based on assessment  - Modify environment to reduce risk of injury  - Consider OT/PT consult to assist with strengthening/mobility  Outcome: Progressing  Goal: Maintain or return to baseline ADL function  Description  INTERVENTIONS:  -  Assess patient's ability to carry out ADLs; assess patient's baseline for ADL function and identify physical deficits which impact ability to perform ADLs (bathing, care of mouth/teeth, toileting, grooming, dressing, etc )  - Assess/evaluate cause of self-care deficits   - Assess range of motion  - Assess patient's mobility; develop plan if impaired  - Assess patient's need for assistive devices and provide as appropriate  - Encourage maximum independence but intervene and supervise when necessary  - Involve family in performance of ADLs  - Assess for home care needs following discharge   - Consider OT consult to assist with ADL evaluation and planning for discharge  - Provide patient education as appropriate  Outcome: Progressing  Goal: Maintain or return mobility status to optimal level  Description  INTERVENTIONS:  - Assess patient's baseline mobility status (ambulation, transfers, stairs, etc )    - Identify cognitive and physical deficits and behaviors that affect mobility  - Identify mobility aids required to assist with transfers and/or ambulation (gait belt, sit-to-stand, lift, walker, cane, etc )  - [place] fall precautions as indicated by assessment  - Record patient progress and toleration of activity level on Mobility SBAR; progress patient to next Phase/Stage  - Instruct patient to call for assistance with activity based on assessment  - Consider rehabilitation consult to assist with strengthening/weightbearing, etc   Outcome: Progressing     Problem: DISCHARGE PLANNING  Goal: Discharge to home or other facility with appropriate resources  Description  INTERVENTIONS:  - Identify barriers to discharge w/patient and caregiver  - Arrange for needed discharge resources and transportation as appropriate  - Identify discharge learning needs (meds, wound care, etc )  - Arrange for interpretive services to assist at discharge as needed  - Refer to Case Management Department for coordinating discharge planning if the patient needs post-hospital services based on physician/advanced practitioner order or complex needs related to functional status, cognitive ability, or social support system  Outcome: Progressing     Problem: Knowledge Deficit  Goal: Patient/family/caregiver demonstrates understanding of disease process, treatment plan, medications, and discharge instructions  Description  Complete learning assessment and assess knowledge base  Interventions:  - Provide teaching at level of understanding  - Provide teaching via preferred learning methods  Outcome: Progressing     Problem: Nutrition/Hydration-ADULT  Goal: Nutrient/Hydration intake appropriate for improving, restoring or maintaining nutritional needs  Description  Monitor and assess patient's nutrition/hydration status for malnutrition  Collaborate with interdisciplinary team and initiate plan and interventions as ordered  Monitor patient's weight and dietary intake as ordered or per policy  Utilize nutrition screening tool and intervene as necessary  Determine patient's food preferences and provide high-protein, high-caloric foods as appropriate       INTERVENTIONS:  - Monitor oral intake, urinary output, labs, and treatment plans  - Assess nutrition and hydration status and recommend course of action  - Evaluate amount of meals eaten  - Assist patient with eating if necessary   - Allow adequate time for meals  - Recommend/ encourage appropriate diets, oral nutritional supplements, and vitamin/mineral supplements  - Order, calculate, and assess calorie counts as needed  - Recommend, monitor, and adjust tube feedings and TPN/PPN based on assessed needs  - Assess need for intravenous fluids  - Provide specific nutrition/hydration education as appropriate  - Include patient/family/caregiver in decisions related to nutrition  Outcome: Progressing     Problem: CARDIOVASCULAR - ADULT  Goal: Maintains optimal cardiac output and hemodynamic stability  Description  INTERVENTIONS:  - Monitor I/O, vital signs and rhythm  - Monitor for S/S and trends of decreased cardiac output  - Administer and titrate ordered vasoactive medications to optimize hemodynamic stability  - Assess quality of pulses, skin color and temperature  - Assess for signs of decreased coronary artery perfusion  - Instruct patient to report change in severity of symptoms  Outcome: Progressing  Goal: Absence of cardiac dysrhythmias or at baseline rhythm  Description  INTERVENTIONS:  - Continuous cardiac monitoring, vital signs, obtain 12 lead EKG if ordered  - Administer antiarrhythmic and heart rate control medications as ordered  - Monitor electrolytes and administer replacement therapy as ordered  Outcome: Progressing     Problem: RESPIRATORY - ADULT  Goal: Achieves optimal ventilation and oxygenation  Description  INTERVENTIONS:  - Assess for changes in respiratory status  - Assess for changes in mentation and behavior  - Position to facilitate oxygenation and minimize respiratory effort  - Oxygen administered by appropriate delivery if ordered  - Initiate smoking cessation education as indicated  - Encourage broncho-pulmonary hygiene including cough, deep breathe, Incentive Spirometry  - Assess the need for suctioning and aspirate as needed  - Assess and instruct to report SOB or any respiratory difficulty  - Respiratory Therapy support as indicated  Outcome: Progressing

## 2020-07-23 NOTE — SOCIAL WORK
Met with pt and pt's mother Beverly Hunter to discuss the role of CM and to discuss any help pt may need prior to dc  Pt lives with her mother, father, and daughter in a 3 story home with 12 EYIMI; bathroom on the 1st floor and bedroom on the 2nd floor  Pt performed ADL's indptly pta, no use of DME  No hx of HHC or rehab  No hx of mental health or D&A treatment  Pt's PCP is Dr Lou Ramos  Pt's preferred pharmacy is [facility] in [facility]  CM was informed that pt will need Eliquis at dc  Pt is requesting [facility]  CM informed Dr Carty Deon of same  Contact: Beverly Dale (mother) [phone](J) or [phone](Z)  No POA or living will  Beverly Hunter will transport pt home at dc  CM reviewed d/c planning process including the following: identifying help at home, patient preference for d/c planning needs, Discharge Lounge, [facility] Meds to Bed program, availability of treatment team to discuss questions or concerns patient and/or family may have regarding understanding medications and recognizing signs and symptoms once discharged  CM also encouraged patient to follow up with all recommended appointments after discharge  Patient advised of importance for patient and family to participate in managing patients medical well being  Patient/caregiver received discharge checklist  Content reviewed  Patient/caregiver encouraged to participate in discharge plan of care prior to discharge home

## 2020-07-23 NOTE — ASSESSMENT & PLAN NOTE
CT angiogram of chest  "consistent with bilateral lower lobe segmental/subsegmental pulmonary emboli with overall moderate clot burden    Somewhat wedge-shaped opacity in the right lower lobe is likely secondary to pulmonary infarct"  Echocardiogram was unremarkable  Lower extremity Dopplers negative for residual DVT  No indication for thrombolysis per interventional radiology  Will transition IV heparin to Eliquis this evening  Monitor overnight for stability  Check ambulatory pulse oximetry tomorrow  thrombosis panel pending

## 2020-07-23 NOTE — CONSULTS
Consultation - Interventional Radiology  Jose Calvo 44 y o  female MRN: [id number]  [facility] Encounter: [id number]      ASSESSMENT/PLAN:    44year old female presented to [facility] and subsequently transferred to [facility] [place] for chest heaviness and SOB  Patient has a history of breast CA s/p b/l mastectomies and reconstruction  Chest CT  "Findings consistent with bilateral lower lobe segmental/subsegmental pulmonary emboli with overall moderate clot burden  Somewhat wedge-shaped opacity in the right lower lobe is likely secondary to pulmonary infarct "     Upon examination, patient is sitting upright in bed, on RA  She is able to carryout a conversation without developing SOB and her O2 ranges from 95-97%  Patient stated she has pain under her right pit and right sided ribs, but is able to position herself where it does not hurt  She went to her family doctor on Monday for these persistent symptoms and had blood work and a chest x-ray, when these symptoms continued to worsen she went to urgent care where she was directed to the ER because she had a pulse ox of 91%  She had some cramping in her b/l LE last week and then developed her symptoms which brought her to the hospital      Given the current symptomology of the patient, labs, CTA, and pending echo, the risks outweigh the benefit of performing a PE thrombolysis on her  Evaluated the CTA, awaiting the echo, and the negative troponin, non-elevated pro-BNP, and clinical status of the patient, it is not recommended to proceed with thrombolysis       This was discussed in detail with the patient and family at bedside and they agree that it is not worth the risk of proceeding      - continue heparin gtt  - likely need lifelong a/c given CA history  - please notify IR if there is any change in the clinical status and can re-assess and consider thrombolysis        Problem List     * (Principal) Bilateral pulmonary emboli    Right leg pain    Breast cancer Essential hypertension    Hyperlipidemia          Reason for Consult / Principal Problem:    HPI: Prosper Daily is a 44y o  year old female who presents with Bilateral [facility]ism ([facility]      Review of Systems   Constitutional: Positive for activity change and fatigue  Negative for fever  Respiratory: Positive for chest tightness  Cardiovascular: Positive for chest pain  Gastrointestinal: Negative for abdominal pain and constipation  Genitourinary: Negative for difficulty urinating  Musculoskeletal: Negative for joint swelling  Neurological: Negative for dizziness  Psychiatric/Behavioral: Negative for confusion  Past Medical History:  Past Medical History:   Diagnosis Date    Allergic rhinitis     [facility] CA [facility]     IgA nephropathy        Past Surgical History:  Past Surgical History:   Procedure Laterality Date    BREAST BIOPSY Left     l breast    IR IMAGE GUIDED BIOPSY/ASPIRATION KIDNEY  12/19/2018    MASTECTOMY Bilateral 07/25/2019       Social History:  Social History     Substance and Sexual Activity   Alcohol Use Yes    Comment: socially     Social History     Substance and Sexual Activity   Drug Use No     Social History     Tobacco Use   Smoking Status Never Smoker   Smokeless Tobacco Never Used       Family History:  No family history on file  Allergies:   Allergies   Allergen Reactions    Bactrim [Sulfamethoxazole-Trimethoprim]     Sulfasalazine Rash       Medications:  Medications Prior to Admission   Medication    aspirin 81 mg chewable tablet    Cholecalciferol 50 MCG (2000 UT) CAPS    levocetirizine (Xyzal) 5 MG tablet    lisinopril (ZESTRIL) 10 mg tablet    simvastatin (ZOCOR) 40 mg tablet    tamoxifen (NOLVADEX) 20 mg tablet     Current Facility-Administered Medications   Medication Dose Route Frequency    acetaminophen (TYLENOL) tablet 650 mg  650 mg Oral Q6H PRN    aspirin chewable tablet 81 mg  81 mg Oral Daily    cholecalciferol (VITAMIN D3) tablet 2,000 Units  2,000 Units Oral Daily    heparin (porcine) 25,000 units in 250 mL infusion (premix)  3-30 Units/kg/hr (Order-Specific) Intravenous Titrated    hydrALAZINE (APRESOLINE) injection 5 mg  5 mg Intravenous Q6H PRN    lisinopril (ZESTRIL) tablet 10 mg  10 mg Oral Daily    loratadine (CLARITIN) tablet 10 mg  10 mg Oral Daily    morphine (PF) 4 mg/mL injection 4 mg  4 mg Intravenous Q4H PRN    morphine injection 2 mg  2 mg Intravenous Q4H PRN    ondansetron (ZOFRAN) injection 4 mg  4 mg Intravenous Q4H PRN    pravastatin (PRAVACHOL) tablet 80 mg  80 mg Oral Daily With Dinner    tamoxifen (NOLVADEX) tablet 20 mg  20 mg Oral Daily       Vitals:  /81   Pulse 89   Temp 98 8 °F (37 1 °C) (Oral)   Resp 20   Ht 5' 3" (1 6 m)   Wt 71 1 kg (156 lb 12 oz)   SpO2 97%   BMI 27 77 kg/m²   Body mass index is 27 77 kg/m²  Weight (last 2 days)     Date/Time   Weight    07/22/20 22:10:26   71 1 (156 75)              I/Os:  No intake or output data in the 24 hours ending 07/23/20 1025    PHYSICAL EXAM  Physical Exam   Constitutional: She is oriented to person, place, and time  She appears well-developed and well-nourished  HENT:   Head: Normocephalic and atraumatic  Neck: Normal range of motion  Neck supple  Cardiovascular: Normal rate and regular rhythm  Pulmonary/Chest: Effort normal and breath sounds normal  No respiratory distress  She has no wheezes  Abdominal: Soft  Bowel sounds are normal  She exhibits no distension  Musculoskeletal: Normal range of motion  She exhibits no edema  Neurological: She is alert and oriented to person, place, and time  Skin: Skin is warm and dry  Capillary refill takes less than 2 seconds  Psychiatric: She has a normal mood and affect   Her behavior is normal         Lab Results and Cultures:   CBC with diff:   Lab Results   Component Value Date    WBC 10 11 07/23/2020    HGB 11 3 (L) 07/23/2020    HCT 35 1 07/23/2020    MCV 87 07/23/2020    PLT 220 07/23/2020    MCH 27 8 07/23/2020    MCHC 32 2 07/23/2020    RDW 12 9 07/23/2020    MPV 9 8 07/23/2020    NRBC 0 07/23/2020      BMP/CMP:  Lab Results   Component Value Date    K 3 8 07/22/2020     07/22/2020    CO2 25 07/22/2020    BUN 11 07/22/2020    CREATININE 1 15 07/22/2020    CALCIUM 8 8 07/22/2020    AST 12 (L) 07/22/2020    ALT 11 07/22/2020    ALKPHOS 44 07/22/2020    EGFR 55 07/22/2020   ,     Coags:   Lab Results   Component Value Date     (H) 07/23/2020    INR 1 16 07/22/2020   ,   Results from last 7 days   Lab Units 07/23/20  0428 07/22/20  2244 07/22/20  1716   PTT seconds 113* 100* 34   INR   --   --  1 16        Lipid Panel: No results found for: CHOL  Lab Results   Component Value Date    HDL 40 07/17/2020     Lab Results   Component Value Date    HDL 40 07/17/2020     Lab Results   Component Value Date    LDLCALC 64 07/17/2020     Lab Results   Component Value Date    TRIG 125 07/17/2020       HgbA1c: No results found for: HGBA1C    Blood Culture: No results found for: BLOODCX,   Urinalysis:   Lab Results   Component Value Date    COLORU Yellow 07/17/2020    CLARITYU Slightly Cloudy (A) 07/17/2020    SPECGRAV 1 010 07/17/2020    PHUR 5 5 07/17/2020    PHUR 6 0 09/24/2018    LEUKOCYTESUR 1+ (A) 07/17/2020    NITRITE Negative 07/17/2020    GLUCOSEU Negative 07/17/2020    KETONESU Negative 07/17/2020    BILIRUBINUR Negative 07/17/2020    BLOODU 2+ (A) 07/17/2020   ,   Urine Culture: No results found for: URINECX,   Wound Culure:  No results found for: WOUNDCULT    Imaging Studies: I have personally reviewed pertinent films in PACS with a Radiologist     Counseling / Coordination of Care  Total time spent today  45 minutes  Greater than 50% of total time was spent with the patient and / or family counseling and / or coordination of care  Thank you for allowing me to participate in the care of [address] [place] [address]   Please don't hesitate to call, text, email, or TigerText with any questions  This text is generated with voice recognition software  There may be translation, syntax,  or grammatical errors  If you have any questions, please contact the dictating provider

## 2020-07-24 ENCOUNTER — APPOINTMENT (INPATIENT)
Dept: RADIOLOGY | Facility: HOSPITAL | Age: 50
DRG: 176 | End: 2020-07-24
Attending: INTERNAL MEDICINE
Payer: COMMERCIAL

## 2020-07-24 LAB
ATRIAL RATE: 78 BPM
ATRIAL RATE: 78 BPM
BASOPHILS # BLD AUTO: 0.05 THOUSANDS/ΜL (ref 0–0.1)
BASOPHILS NFR BLD AUTO: 1 % (ref 0–1)
CARDIOLIPIN IGA SER IA-ACNC: <9 APL U/ML (ref 0–11)
CARDIOLIPIN IGG SER IA-ACNC: <9 GPL U/ML (ref 0–14)
CARDIOLIPIN IGM SER IA-ACNC: <9 MPL U/ML (ref 0–12)
EOSINOPHIL # BLD AUTO: 0.12 THOUSAND/ΜL (ref 0–0.61)
EOSINOPHIL NFR BLD AUTO: 1 % (ref 0–6)
ERYTHROCYTE [DISTWIDTH] IN BLOOD BY AUTOMATED COUNT: 13 % (ref 11.6–15.1)
HCT VFR BLD AUTO: 36.6 % (ref 34.8–46.1)
HGB BLD-MCNC: 11.6 G/DL (ref 11.5–15.4)
IMM GRANULOCYTES # BLD AUTO: 0.02 THOUSAND/UL (ref 0–0.2)
IMM GRANULOCYTES NFR BLD AUTO: 0 % (ref 0–2)
LYMPHOCYTES # BLD AUTO: 1.68 THOUSANDS/ΜL (ref 0.6–4.47)
LYMPHOCYTES NFR BLD AUTO: 19 % (ref 14–44)
MCH RBC QN AUTO: 27.8 PG (ref 26.8–34.3)
MCHC RBC AUTO-ENTMCNC: 31.7 G/DL (ref 31.4–37.4)
MCV RBC AUTO: 88 FL (ref 82–98)
MONOCYTES # BLD AUTO: 0.69 THOUSAND/ΜL (ref 0.17–1.22)
MONOCYTES NFR BLD AUTO: 8 % (ref 4–12)
NEUTROPHILS # BLD AUTO: 6.44 THOUSANDS/ΜL (ref 1.85–7.62)
NEUTS SEG NFR BLD AUTO: 71 % (ref 43–75)
NRBC BLD AUTO-RTO: 0 /100 WBCS
P AXIS: 60 DEGREES
P AXIS: 62 DEGREES
PLATELET # BLD AUTO: 227 THOUSANDS/UL (ref 149–390)
PMV BLD AUTO: 9.9 FL (ref 8.9–12.7)
PR INTERVAL: 132 MS
PR INTERVAL: 136 MS
QRS AXIS: 21 DEGREES
QRS AXIS: 21 DEGREES
QRSD INTERVAL: 80 MS
QRSD INTERVAL: 82 MS
QT INTERVAL: 376 MS
QT INTERVAL: 386 MS
QTC INTERVAL: 428 MS
QTC INTERVAL: 440 MS
RBC # BLD AUTO: 4.18 MILLION/UL (ref 3.81–5.12)
T WAVE AXIS: 22 DEGREES
T WAVE AXIS: 23 DEGREES
VENTRICULAR RATE: 78 BPM
VENTRICULAR RATE: 78 BPM
WBC # BLD AUTO: 9 THOUSAND/UL (ref 4.31–10.16)

## 2020-07-24 PROCEDURE — 71045 X-RAY EXAM CHEST 1 VIEW: CPT

## 2020-07-24 PROCEDURE — 93005 ELECTROCARDIOGRAM TRACING: CPT

## 2020-07-24 PROCEDURE — 99232 SBSQ HOSP IP/OBS MODERATE 35: CPT | Performed by: INTERNAL MEDICINE

## 2020-07-24 PROCEDURE — 85025 COMPLETE CBC W/AUTO DIFF WBC: CPT | Performed by: INTERNAL MEDICINE

## 2020-07-24 PROCEDURE — 93010 ELECTROCARDIOGRAM REPORT: CPT | Performed by: INTERNAL MEDICINE

## 2020-07-24 RX ORDER — BENZONATATE 100 MG/1
100 CAPSULE ORAL 3 TIMES DAILY
Status: DISCONTINUED | OUTPATIENT
Start: 2020-07-24 | End: 2020-07-25 | Stop reason: HOSPADM

## 2020-07-24 RX ADMIN — ACETAMINOPHEN 650 MG: 325 TABLET, FILM COATED ORAL at 16:57

## 2020-07-24 RX ADMIN — LISINOPRIL 10 MG: 10 TABLET ORAL at 09:39

## 2020-07-24 RX ADMIN — APIXABAN 10 MG: 5 TABLET, FILM COATED ORAL at 09:39

## 2020-07-24 RX ADMIN — ACETAMINOPHEN 650 MG: 325 TABLET, FILM COATED ORAL at 09:39

## 2020-07-24 RX ADMIN — LORATADINE 10 MG: 10 TABLET ORAL at 09:39

## 2020-07-24 RX ADMIN — HYDROCODONE BITARTRATE AND HOMATROPINE METHYLBROMIDE ORAL SOLUTION 5 ML: 5; 1.5 LIQUID ORAL at 22:39

## 2020-07-24 RX ADMIN — MELATONIN 2000 UNITS: at 09:39

## 2020-07-24 RX ADMIN — BENZONATATE 100 MG: 100 CAPSULE ORAL at 21:50

## 2020-07-24 RX ADMIN — TAMOXIFEN CITRATE 20 MG: 10 TABLET, FILM COATED ORAL at 10:06

## 2020-07-24 RX ADMIN — APIXABAN 10 MG: 5 TABLET, FILM COATED ORAL at 16:53

## 2020-07-24 RX ADMIN — PRAVASTATIN SODIUM 80 MG: 80 TABLET ORAL at 16:52

## 2020-07-24 RX ADMIN — ASPIRIN 81 MG 81 MG: 81 TABLET ORAL at 09:39

## 2020-07-24 RX ADMIN — BENZONATATE 100 MG: 100 CAPSULE ORAL at 13:50

## 2020-07-24 NOTE — PROGRESS NOTES
Pastoral Care done via tele-visit  Patient appeared to be in great spirits  However she did say that she was in some pain  Just went for testing and she believes she has one more today  Did not appear to be struggling spiritually       07/24/20 1100   Clinical Encounter Type   Visited With Patient   Routine Visit Introduction   Continue Visiting No

## 2020-07-24 NOTE — SOCIAL WORK
Received a call from [address] [place] [address] who states copay is $12    Pt is eligible for free 30 day trial      Pt is agreeable to copay  Dr Gino Cordoba aware of same

## 2020-07-24 NOTE — PROGRESS NOTES
Progress Note - Ankita Lockhart 1970, 44 y o  female MRN: [id number]    Unit/Bed#: [facility] 710-01 Encounter: [id number]    Primary Care Provider: Sherrill Rodriguez MD   Date and time admitted to hospital: 7/22/2020  9:50 PM        Hyperlipidemia  Assessment & Plan  Continue ASA/statin    Essential hypertension  Assessment & Plan  Low-sodium diet  Continue Lisinopril - PRN IV Hydralazine for BP spikes    Breast cancer   Assessment & Plan  Status post right lumpectomy in April 2019 w/ sentinel node biopsy for infiltrating ductal carcinoma - s/p bilateral mastectomy in July 2019  Per [facility] plastic surgery documentation earlier this year on 3/19/20: "1) right breast Extensive Formal Capsulectomy  2) right breast revisional mammoplasty with attention toward breast mound remodeling and reshaping and adjustment of the lateral breast pocket with extensive capsuloraphy   3) right breast autologous fat grafting with utilzation of the REVOLVE fat transfer system  4) Suction Assisted Lipectomy (SAL) of 6 separate distinct anatomic regions inclusive of right abdomen, right flank, right hip, left abdomen, left hip, and left flank  5) Left breast Extensive Formal Capsulectomy  6) Left breast revisional mammoplasty with attention toward breast mound remodeling and reshaping and adjustment of the lateral breast pocket with extensive capsuloraphy   7) Left breast autologous fat grafting with utilzation of the REVOLVE fat transfer system"    Continue daily Tamoxifen  Outpatient follow-up with oncology    * Bilateral pulmonary emboli  Assessment & Plan  CT angiogram of chest  "consistent with bilateral lower lobe segmental/subsegmental pulmonary emboli with overall moderate clot burden    Somewhat wedge-shaped opacity in the right lower lobe is likely secondary to pulmonary infarct"  Echocardiogram was unremarkable  Lower extremity Dopplers negative for residual DVT  No indication for thrombolysis per interventional radiology  Currently off IV heparin and on p o  Eliquis  Reports that she does have significant pain at the site of the right pulmonary infarction  Also reports intrusive dry cough  Will start Tessalon around the clock and add p r n  Hycodan for symptom control  Continue to monitor for stability and reassess tomorrow        VTE Pharmacologic Prophylaxis:   Pharmacologic: Apixaban (Eliquis)  Mechanical VTE Prophylaxis in Place: Yes    Patient Centered Rounds: I have performed bedside rounds with nursing staff today  Education and Discussions with Family / Patient:  Patient, plan of care    Time Spent for Care: 20 minutes  More than 50% of total time spent on counseling and coordination of care as described above  Current Length of Stay: 2 day(s)    Current Patient Status: Inpatient   Certification Statement: The patient will continue to require additional inpatient hospital stay due to Uncontrolled pain and cough    Discharge Plan:  Home within 24 hours    Code Status: Level 1 - Full Code      Subjective:   Reports right-sided chest pain which was not present yesterday  Also has worsening of cough symptoms, denies any sputum  Objective:     Vitals:   Temp (24hrs), Av °F (37 2 °C), Min:98 4 °F (36 9 °C), Max:100 2 °F (37 9 °C)    Temp:  [98 4 °F (36 9 °C)-100 2 °F (37 9 °C)] 99 °F (37 2 °C)  HR:  [] 101  Resp:  [16-19] 18  BP: (106-133)/(61-77) 133/73  SpO2:  [94 %-98 %] 98 %  Body mass index is 27 77 kg/m²  Input and Output Summary (last 24 hours): Intake/Output Summary (Last 24 hours) at 2020 1653  Last data filed at 2020 1700  Gross per 24 hour   Intake 240 ml   Output --   Net 240 ml       Physical Exam:     Physical Exam   Constitutional: She is oriented to person, place, and time  She appears well-developed and well-nourished  HENT:   Head: Normocephalic and atraumatic  Eyes: EOM are normal    Neck: Neck supple  Cardiovascular: Normal rate and regular rhythm  Pulmonary/Chest: Effort normal  She has no wheezes  She has no rales  She exhibits no tenderness  Abdominal: Soft  Musculoskeletal: She exhibits no edema  Neurological: She is alert and oriented to person, place, and time  Skin: Skin is warm and dry  Psychiatric: She has a normal mood and affect  Her behavior is normal          Additional Data:     Labs:    Results from last 7 days   Lab Units 07/24/20  0505   WBC Thousand/uL 9 00   HEMOGLOBIN g/dL 11 6   HEMATOCRIT % 36 6   PLATELETS Thousands/uL 227   NEUTROS PCT % 71   LYMPHS PCT % 19   MONOS PCT % 8   EOS PCT % 1     Results from last 7 days   Lab Units 07/22/20  1430   SODIUM mmol/L 139   POTASSIUM mmol/L 3 8   CHLORIDE mmol/L 103   CO2 mmol/L 25   BUN mg/dL 11   CREATININE mg/dL 1 15   ANION GAP mmol/L 11   CALCIUM mg/dL 8 8   ALBUMIN g/dL 3 8   TOTAL BILIRUBIN mg/dL 0 50   ALK PHOS U/L 44   ALT U/L 11   AST U/L 12*   GLUCOSE RANDOM mg/dL 98     Results from last 7 days   Lab Units 07/22/20  1716   INR  1 16     Results from last 7 days   Lab Units 07/23/20  2121   POC GLUCOSE mg/dl 105         Results from last 7 days   Lab Units 07/22/20  1430   PROCALCITONIN ng/ml <0 05           * I Have Reviewed All Lab Data Listed Above  * Additional Pertinent Lab Tests Reviewed:  All Labs Within Last 24 Hours Reviewed    Imaging:    Imaging Reports Reviewed Today Include: CXR  Imaging Personally Reviewed by Myself Includes:  CXR, mild hazyness in RLL at the site of the pulmonary infarction    Recent Cultures (last 7 days):           Last 24 Hours Medication List:     Current Facility-Administered Medications:  acetaminophen 650 mg Oral Q6H PRN Rose Pyle MD   apixaban 10 mg Oral BID Billy John MD   Followed by       Brandi Bermudez ON 7/30/2020] apixaban 5 mg Oral BID Billy John MD   aspirin 81 mg Oral Daily Rose Pyle MD   benzonatate 100 mg Oral TID Billy John MD   cholecalciferol 2,000 Units Oral Daily Rose Pyle MD   hydrALAZINE 5 mg Intravenous Q6H PRN Sunita Freeman MD   HYDROcodone-homatropine 5 mL Oral Q4H PRN Vinnie Garcia MD   lisinopril 10 mg Oral Daily Sunita Freeman MD   loratadine 10 mg Oral Daily Sunita Freeman MD   morphine injection 4 mg Intravenous Q4H PRN Sunita Freeman MD   morphine injection 2 mg Intravenous Q4H PRN Suntia Freeman MD   ondansetron 4 mg Intravenous Q4H PRN Sunita Freeman MD   pravastatin 80 mg Oral Daily With Stefany Yin MD   tamoxifen 20 mg Oral Daily Sunita Freeman MD        Today, Patient Was Seen By: Mark Julio MD    ** Please Note: Dictation voice to text software may have been used in the creation of this document   **

## 2020-07-24 NOTE — ASSESSMENT & PLAN NOTE
Status post right lumpectomy in April 2019 w/ sentinel node biopsy for infiltrating ductal carcinoma - s/p bilateral mastectomy in July 2019  Per [facility] plastic surgery documentation earlier this year on 3/19/20: "1) right breast Extensive Formal Capsulectomy  2) right breast revisional mammoplasty with attention toward breast mound remodeling and reshaping and adjustment of the lateral breast pocket with extensive capsuloraphy   3) right breast autologous fat grafting with utilzation of the REVOLVE fat transfer system  4) Suction Assisted Lipectomy (SAL) of 6 separate distinct anatomic regions inclusive of right abdomen, right flank, right hip, left abdomen, left hip, and left flank  5) Left breast Extensive Formal Capsulectomy  6) Left breast revisional mammoplasty with attention toward breast mound remodeling and reshaping and adjustment of the lateral breast pocket with extensive capsuloraphy   7) Left breast autologous fat grafting with utilzation of the REVOLVE fat transfer system"    Continue daily Tamoxifen  Outpatient follow-up with oncology

## 2020-07-24 NOTE — ASSESSMENT & PLAN NOTE
CT angiogram of chest  "consistent with bilateral lower lobe segmental/subsegmental pulmonary emboli with overall moderate clot burden  Somewhat wedge-shaped opacity in the right lower lobe is likely secondary to pulmonary infarct"  Echocardiogram was unremarkable  Lower extremity Dopplers negative for residual DVT  No indication for thrombolysis per interventional radiology  Currently off IV heparin and on p o  Eliquis  Reports that she does have significant pain at the site of the right pulmonary infarction  Also reports intrusive dry cough  Will start Tessalon around the clock and add p r n  Hycodan for symptom control    Continue to monitor for stability and reassess tomorrow

## 2020-07-25 VITALS
SYSTOLIC BLOOD PRESSURE: 105 MMHG | RESPIRATION RATE: 18 BRPM | WEIGHT: 156.75 LBS | HEIGHT: 63 IN | DIASTOLIC BLOOD PRESSURE: 64 MMHG | TEMPERATURE: 98.1 F | OXYGEN SATURATION: 95 % | BODY MASS INDEX: 27.77 KG/M2 | HEART RATE: 74 BPM

## 2020-07-25 LAB
BASOPHILS # BLD AUTO: 0.05 THOUSANDS/ΜL (ref 0–0.1)
BASOPHILS NFR BLD AUTO: 1 % (ref 0–1)
EOSINOPHIL # BLD AUTO: 0.15 THOUSAND/ΜL (ref 0–0.61)
EOSINOPHIL NFR BLD AUTO: 2 % (ref 0–6)
ERYTHROCYTE [DISTWIDTH] IN BLOOD BY AUTOMATED COUNT: 13.1 % (ref 11.6–15.1)
HCT VFR BLD AUTO: 35.7 % (ref 34.8–46.1)
HGB BLD-MCNC: 11.4 G/DL (ref 11.5–15.4)
IMM GRANULOCYTES # BLD AUTO: 0.02 THOUSAND/UL (ref 0–0.2)
IMM GRANULOCYTES NFR BLD AUTO: 0 % (ref 0–2)
LYMPHOCYTES # BLD AUTO: 2.13 THOUSANDS/ΜL (ref 0.6–4.47)
LYMPHOCYTES NFR BLD AUTO: 23 % (ref 14–44)
MCH RBC QN AUTO: 27.9 PG (ref 26.8–34.3)
MCHC RBC AUTO-ENTMCNC: 31.9 G/DL (ref 31.4–37.4)
MCV RBC AUTO: 87 FL (ref 82–98)
MONOCYTES # BLD AUTO: 0.65 THOUSAND/ΜL (ref 0.17–1.22)
MONOCYTES NFR BLD AUTO: 7 % (ref 4–12)
NEUTROPHILS # BLD AUTO: 6.13 THOUSANDS/ΜL (ref 1.85–7.62)
NEUTS SEG NFR BLD AUTO: 67 % (ref 43–75)
NRBC BLD AUTO-RTO: 0 /100 WBCS
PLATELET # BLD AUTO: 239 THOUSANDS/UL (ref 149–390)
PMV BLD AUTO: 9.7 FL (ref 8.9–12.7)
RBC # BLD AUTO: 4.09 MILLION/UL (ref 3.81–5.12)
WBC # BLD AUTO: 9.13 THOUSAND/UL (ref 4.31–10.16)

## 2020-07-25 PROCEDURE — 99239 HOSP IP/OBS DSCHRG MGMT >30: CPT | Performed by: INTERNAL MEDICINE

## 2020-07-25 PROCEDURE — 94618 PULMONARY STRESS TESTING: CPT

## 2020-07-25 PROCEDURE — 85025 COMPLETE CBC W/AUTO DIFF WBC: CPT | Performed by: INTERNAL MEDICINE

## 2020-07-25 RX ORDER — BENZONATATE 100 MG/1
100 CAPSULE ORAL 3 TIMES DAILY
Qty: 30 CAPSULE | Refills: 0 | Status: SHIPPED | OUTPATIENT
Start: 2020-07-25 | End: 2021-02-05

## 2020-07-25 RX ADMIN — TAMOXIFEN CITRATE 20 MG: 10 TABLET, FILM COATED ORAL at 08:53

## 2020-07-25 RX ADMIN — LORATADINE 10 MG: 10 TABLET ORAL at 08:51

## 2020-07-25 RX ADMIN — ASPIRIN 81 MG 81 MG: 81 TABLET ORAL at 08:52

## 2020-07-25 RX ADMIN — ACETAMINOPHEN 650 MG: 325 TABLET, FILM COATED ORAL at 05:08

## 2020-07-25 RX ADMIN — APIXABAN 10 MG: 5 TABLET, FILM COATED ORAL at 08:52

## 2020-07-25 RX ADMIN — MELATONIN 2000 UNITS: at 08:50

## 2020-07-25 RX ADMIN — BENZONATATE 100 MG: 100 CAPSULE ORAL at 08:50

## 2020-07-25 NOTE — DISCHARGE SUMMARY
Discharge Summary - [place] [address] Internal Medicine    Patient Information: Rhonda Zuluaga 44 y o  female MRN: [id number]  Unit/Bed#: [facility] 710-01 Encounter: [id number]    Discharging Physician / Practitioner: Bel Stone MD  PCP: Richad Nageotte, MD  Admission Date: 7/22/2020  Discharge Date: 07/25/20    Disposition:     Home    Reason for Admission: pulmonary emboli    Discharge Diagnoses:     Principal Problem:    Bilateral pulmonary emboli  Active Problems:    Breast cancer     Essential hypertension    Hyperlipidemia  Resolved Problems:    * No resolved hospital problems  *      Consultations During Hospital Stay:  · Interventional radiology    Procedures Performed:     · Home oxygen evaluation    Significant Findings / Test Results:     · CTA chest: Findings consistent with bilateral lower lobe segmental/subsegmental pulmonary emboli with overall moderate clot burden  Somewhat wedge-shaped opacity in the right lower lobe is likely secondary to pulmonary infarct  Lower limb venous duplex: RIGHT LOWER LIMB:  No evidence of acute or chronic deep vein thrombosis  No evidence of superficial thrombophlebitis noted  Doppler evaluation shows a normal response to augmentation maneuvers  Popliteal, posterior tibial and anterior tibial arterial Doppler waveforms are  triphasic   LEFT LOWER LIMB:  No evidence of acute or chronic deep vein thrombosis  No evidence of superficial thrombophlebitis noted  Doppler evaluation shows a normal response to augmentation maneuvers  Popliteal, posterior tibial and anterior tibial arterial Doppler waveforms aretriphasic    CXR: Bibasilar ground glass opacity corresponds with lingular and right lower lobe infarct on CT    Echocardiogram: LEFT VENTRICLE:  Systolic function was normal  Ejection fraction was estimated to be 65 %  There were no regional wall motion abnormalities    There was no evidence of concentric hypertrophy    TRICUSPID VALVE:  There was trace regurgitation  Pulmonary artery systolic pressure was within the normal range  Incidental Findings:   · None     Test Results Pending at Discharge (will require follow up): · None     Outpatient Tests Requested:  · None    Complications:  None    Hospital Course:     Molly Umana is a 44 y o  female patient who originally presented to the hospital on 7/22/2020 due to pulmonary emboli  She was transferred to [facility] to be evaluated by IR for pulmonary artery thrombectomy  She was deemed not a candidate  She was treated with IV heparin  No residual lower extremity DVT was present  Echocardiogram was unremarkable  She was transitioned to eliquis and monitored  She remained stable and was discharge home  Prior to discharge she was counseled extensively on precautions regarding Eliquis use and this was copied to her discharge paperwork  She was counseled to follow up with her PCP in 1 week and given a referred to pulmonology for an outpatient evaluation  Condition at Discharge: stable     Discharge Day Visit / Exam:     Subjective:  Reports continued discomfort at right hemithorac, cough improved, no sputum  Vitals: Blood Pressure: 105/64 (07/25/20 0852)  Pulse: 74 (07/25/20 0752)  Temperature: 98 1 °F (36 7 °C) (07/25/20 0752)  Temp Source: Oral (07/23/20 0823)  Respirations: 18 (07/25/20 0752)  Height: 5' 3" (160 cm) (07/22/20 2210)  Weight - Scale: 71 1 kg (156 lb 12 oz) (07/22/20 2210)  SpO2: 95 % (07/25/20 1124)  Exam:   Physical Exam   Constitutional: She is oriented to person, place, and time  She appears well-developed and well-nourished  HENT:   Head: Atraumatic  Eyes: EOM are normal    Neck: Neck supple  Pulmonary/Chest: No respiratory distress  Neurological: She is alert and oriented to person, place, and time  Discharge instructions/Information to patient and family:   See after visit summary for information provided to patient and family        Provisions for Follow-Up Care:  See after visit summary for information related to follow-up care and any pertinent home health orders  Planned Readmission: No     Discharge Statement:  I spent 40 minutes discharging the patient  This time was spent on the day of discharge  I had direct contact with the patient on the day of discharge  Greater than 50% of the total time was spent examining patient, answering all patient questions, arranging and discussing plan of care with patient as well as directly providing post-discharge instructions  Additional time then spent on discharge activities  Discharge Medications:  See after visit summary for reconciled discharge medications provided to patient and family        ** Please Note: This note has been constructed using a voice recognition system **

## 2020-07-25 NOTE — RESPIRATORY THERAPY NOTE
Home Oxygen Qualifying Test       Patient name: Mary Segura        : 1970   Date of Test:  2020  Diagnosis:      Home Oxygen Test:    **Medicare Guidelines require item(s) 1-5 on all ambulatory patients or 1 and 2 on non-ambulatory patients  1   Baseline SPO2 on Room Air at rest 96 %  2   SPO2 during exercise on Room Air 95 %  During exercise monitor SpO2  If SPO2 increases >=89% with ambulation do not add supplemental             oxygen  If <= 88% on room air add O2 via NC and titrate patient  Patient must be ambulated with O2 and titrated to > 88% with exertion  3   SPO2 on Oxygen at rest na % na lpm     4   SPO2 during exercise on Oxygen  na% a liter flow of na lpm     5   Exercise performed:          walking, duration 10 (min), distance 1500 (feet)          []  Supplemental Home Oxygen is indicated  [x]  Client does not qualify for home oxygen        Respiratory Additional Notes- no sob, no complaints, pt walked the whole floor on room air    Nabila Niece, RT

## 2020-07-25 NOTE — PLAN OF CARE
Problem: PAIN - ADULT  Goal: Verbalizes/displays adequate comfort level or baseline comfort level  Description  Interventions:  - Encourage patient to monitor pain and request assistance  - Assess pain using appropriate pain scale  - Administer analgesics based on type and severity of pain and evaluate response  - Implement non-pharmacological measures as appropriate and evaluate response  - Consider cultural and social influences on pain and pain management  - Notify physician/advanced practitioner if interventions unsuccessful or patient reports new pain  7/25/2020 1118 by Shantel Jackson RN  Outcome: Completed  7/25/2020 1018 by Shantel Jackson RN  Outcome: Progressing     Problem: INFECTION - ADULT  Goal: Absence or prevention of progression during hospitalization  Description  INTERVENTIONS:  - Assess and monitor for signs and symptoms of infection  - Monitor lab/diagnostic results  - Monitor all insertion sites, i e  indwelling lines, tubes, and drains  - [place] appropriate cooling/warming therapies per order  - Administer medications as ordered  - Instruct and encourage patient and family to use good hand hygiene technique  - Identify and instruct in appropriate isolation precautions for identified infection/condition   7/25/2020 1118 by Shantel Jackson RN  Outcome: Completed  7/25/2020 1018 by Shantel Jackson RN  Outcome: Progressing  Goal: Absence of fever/infection during neutropenic period  Description  INTERVENTIONS:  - Monitor WBC    7/25/2020 1118 by Shantel Jackson RN  Outcome: Completed  7/25/2020 1018 by Shantel Jackson RN  Outcome: Progressing     Problem: SAFETY ADULT  Goal: Patient will remain free of falls  Description  INTERVENTIONS:  - Assess patient frequently for physical needs  -  Identify cognitive and physical deficits and behaviors that affect risk of falls    -  [place] fall precautions as indicated by assessment   - Educate patient/family on patient safety including physical limitations  - Instruct patient to call for assistance with activity based on assessment  - Modify environment to reduce risk of injury  - Consider OT/PT consult to assist with strengthening/mobility  7/25/2020 1118 by Elizabeth Benites RN  Outcome: Completed  7/25/2020 1018 by Elizabeth Benites RN  Outcome: Progressing  Goal: Maintain or return to baseline ADL function  Description  INTERVENTIONS:  -  Assess patient's ability to carry out ADLs; assess patient's baseline for ADL function and identify physical deficits which impact ability to perform ADLs (bathing, care of mouth/teeth, toileting, grooming, dressing, etc )  - Assess/evaluate cause of self-care deficits   - Assess range of motion  - Assess patient's mobility; develop plan if impaired  - Assess patient's need for assistive devices and provide as appropriate  - Encourage maximum independence but intervene and supervise when necessary  - Involve family in performance of ADLs  - Assess for home care needs following discharge   - Consider OT consult to assist with ADL evaluation and planning for discharge  - Provide patient education as appropriate  7/25/2020 1118 by Elizabeth Benites RN  Outcome: Completed  7/25/2020 1018 by Elizabeth Benites RN  Outcome: Progressing  Goal: Maintain or return mobility status to optimal level  Description  INTERVENTIONS:  - Assess patient's baseline mobility status (ambulation, transfers, stairs, etc )    - Identify cognitive and physical deficits and behaviors that affect mobility  - Identify mobility aids required to assist with transfers and/or ambulation (gait belt, sit-to-stand, lift, walker, cane, etc )  - [place] fall precautions as indicated by assessment  - Record patient progress and toleration of activity level on Mobility SBAR; progress patient to next Phase/Stage  - Instruct patient to call for assistance with activity based on assessment  - Consider rehabilitation consult to assist with strengthening/weightbearing, etc   7/25/2020 1118 by Maryanne Schirmer, RN  Outcome: Completed  7/25/2020 1018 by Maryanne Schirmer, RN  Outcome: Progressing     Problem: DISCHARGE PLANNING  Goal: Discharge to home or other facility with appropriate resources  Description  INTERVENTIONS:  - Identify barriers to discharge w/patient and caregiver  - Arrange for needed discharge resources and transportation as appropriate  - Identify discharge learning needs (meds, wound care, etc )  - Arrange for interpretive services to assist at discharge as needed  - Refer to Case Management Department for coordinating discharge planning if the patient needs post-hospital services based on physician/advanced practitioner order or complex needs related to functional status, cognitive ability, or social support system  7/25/2020 1118 by Maryanne Schirmer, RN  Outcome: Completed  7/25/2020 1018 by Maryanne Schirmer, RN  Outcome: Progressing     Problem: Knowledge Deficit  Goal: Patient/family/caregiver demonstrates understanding of disease process, treatment plan, medications, and discharge instructions  Description  Complete learning assessment and assess knowledge base  Interventions:  - Provide teaching at level of understanding  - Provide teaching via preferred learning methods  7/25/2020 1118 by Maryanne Schirmer, RN  Outcome: Completed  7/25/2020 1018 by Maryanne Schirmer, RN  Outcome: Progressing     Problem: Nutrition/Hydration-ADULT  Goal: Nutrient/Hydration intake appropriate for improving, restoring or maintaining nutritional needs  Description  Monitor and assess patient's nutrition/hydration status for malnutrition  Collaborate with interdisciplinary team and initiate plan and interventions as ordered  Monitor patient's weight and dietary intake as ordered or per policy  Utilize nutrition screening tool and intervene as necessary  Determine patient's food preferences and provide high-protein, high-caloric foods as appropriate  INTERVENTIONS:  - Monitor oral intake, urinary output, labs, and treatment plans  - Assess nutrition and hydration status and recommend course of action  - Evaluate amount of meals eaten  - Assist patient with eating if necessary   - Allow adequate time for meals  - Recommend/ encourage appropriate diets, oral nutritional supplements, and vitamin/mineral supplements  - Order, calculate, and assess calorie counts as needed  - Recommend, monitor, and adjust tube feedings and TPN/PPN based on assessed needs  - Assess need for intravenous fluids  - Provide specific nutrition/hydration education as appropriate  - Include patient/family/caregiver in decisions related to nutrition  7/25/2020 1118 by Yesenia Weston RN  Outcome: Completed  7/25/2020 1018 by Yesenia Weston RN  Outcome: Progressing     Problem: CARDIOVASCULAR - ADULT  Goal: Maintains optimal cardiac output and hemodynamic stability  Description  INTERVENTIONS:  - Monitor I/O, vital signs and rhythm  - Monitor for S/S and trends of decreased cardiac output  - Administer and titrate ordered vasoactive medications to optimize hemodynamic stability  - Assess quality of pulses, skin color and temperature  - Assess for signs of decreased coronary artery perfusion  - Instruct patient to report change in severity of symptoms  7/25/2020 1118 by Yesenia Weston RN  Outcome: Completed  7/25/2020 1018 by Yesenia Weston RN  Outcome: Progressing  Goal: Absence of cardiac dysrhythmias or at baseline rhythm  Description  INTERVENTIONS:  - Continuous cardiac monitoring, vital signs, obtain 12 lead EKG if ordered  - Administer antiarrhythmic and heart rate control medications as ordered  - Monitor electrolytes and administer replacement therapy as ordered  7/25/2020 1118 by Yesenia Weston RN  Outcome: Completed  7/25/2020 1018 by Yesenia Weston RN  Outcome: Progressing     Problem: RESPIRATORY - ADULT  Goal: Achieves optimal ventilation and oxygenation  Description  INTERVENTIONS:  - Assess for changes in respiratory status  - Assess for changes in mentation and behavior  - Position to facilitate oxygenation and minimize respiratory effort  - Oxygen administered by appropriate delivery if ordered  - Initiate smoking cessation education as indicated  - Encourage broncho-pulmonary hygiene including cough, deep breathe, Incentive Spirometry  - Assess the need for suctioning and aspirate as needed  - Assess and instruct to report SOB or any respiratory difficulty  - Respiratory Therapy support as indicated  7/25/2020 1118 by Abdul Snellen, RN  Outcome: Completed  7/25/2020 1018 by Abdul Snellen, RN  Outcome: Progressing

## 2020-07-25 NOTE — DISCHARGE INSTRUCTIONS
Instructions for you from your physician regarding Eliquis:    Do not stop taking Eliquis unless instructed by a physician  Take Eliquis at the same time every day approximately 12 hr apart  If a skin injury and bleeding occurs, hold pressure on the area with a clean cloth ore bandage for approximately 10 min, if bleeding is still persistent please go to an urgent care center or emergency department to evaluate for the need for sutures  If you notice bleeding when couging, in vomit, urine, stools, or black stools, seek medical attention immediately  If you injury your head, report to an emergency department for a CT scan of the brain to evaluate for internal bleeding  Avoid medications like ibuprofen, naproxen and full strength aspirin to avoid the risk of internal bleeding  Avoid alcohol to avoid the risk of injury and internal bleeding  Wear a medical alert bracelet stating that you take an anticoagulant  Notify your physicians, surgeons, dentist that you are now taking Eliquis prior to any surgeries, procedures, or dental work

## 2020-07-25 NOTE — PLAN OF CARE
Problem: PAIN - ADULT  Goal: Verbalizes/displays adequate comfort level or baseline comfort level  Description  Interventions:  - Encourage patient to monitor pain and request assistance  - Assess pain using appropriate pain scale  - Administer analgesics based on type and severity of pain and evaluate response  - Implement non-pharmacological measures as appropriate and evaluate response  - Consider cultural and social influences on pain and pain management  - Notify physician/advanced practitioner if interventions unsuccessful or patient reports new pain  Outcome: Progressing     Problem: INFECTION - ADULT  Goal: Absence or prevention of progression during hospitalization  Description  INTERVENTIONS:  - Assess and monitor for signs and symptoms of infection  - Monitor lab/diagnostic results  - Monitor all insertion sites, i e  indwelling lines, tubes, and drains  - [place] appropriate cooling/warming therapies per order  - Administer medications as ordered  - Instruct and encourage patient and family to use good hand hygiene technique  - Identify and instruct in appropriate isolation precautions for identified infection/condition   Outcome: Progressing  Goal: Absence of fever/infection during neutropenic period  Description  INTERVENTIONS:  - Monitor WBC    Outcome: Progressing     Problem: SAFETY ADULT  Goal: Patient will remain free of falls  Description  INTERVENTIONS:  - Assess patient frequently for physical needs  -  Identify cognitive and physical deficits and behaviors that affect risk of falls    -  [place] fall precautions as indicated by assessment   - Educate patient/family on patient safety including physical limitations  - Instruct patient to call for assistance with activity based on assessment  - Modify environment to reduce risk of injury  - Consider OT/PT consult to assist with strengthening/mobility  Outcome: Progressing  Goal: Maintain or return to baseline ADL function  Description  INTERVENTIONS:  -  Assess patient's ability to carry out ADLs; assess patient's baseline for ADL function and identify physical deficits which impact ability to perform ADLs (bathing, care of mouth/teeth, toileting, grooming, dressing, etc )  - Assess/evaluate cause of self-care deficits   - Assess range of motion  - Assess patient's mobility; develop plan if impaired  - Assess patient's need for assistive devices and provide as appropriate  - Encourage maximum independence but intervene and supervise when necessary  - Involve family in performance of ADLs  - Assess for home care needs following discharge   - Consider OT consult to assist with ADL evaluation and planning for discharge  - Provide patient education as appropriate  Outcome: Progressing  Goal: Maintain or return mobility status to optimal level  Description  INTERVENTIONS:  - Assess patient's baseline mobility status (ambulation, transfers, stairs, etc )    - Identify cognitive and physical deficits and behaviors that affect mobility  - Identify mobility aids required to assist with transfers and/or ambulation (gait belt, sit-to-stand, lift, walker, cane, etc )  - [place] fall precautions as indicated by assessment  - Record patient progress and toleration of activity level on Mobility SBAR; progress patient to next Phase/Stage  - Instruct patient to call for assistance with activity based on assessment  - Consider rehabilitation consult to assist with strengthening/weightbearing, etc   Outcome: Progressing     Problem: DISCHARGE PLANNING  Goal: Discharge to home or other facility with appropriate resources  Description  INTERVENTIONS:  - Identify barriers to discharge w/patient and caregiver  - Arrange for needed discharge resources and transportation as appropriate  - Identify discharge learning needs (meds, wound care, etc )  - Arrange for interpretive services to assist at discharge as needed  - Refer to Case Management Department for coordinating discharge planning if the patient needs post-hospital services based on physician/advanced practitioner order or complex needs related to functional status, cognitive ability, or social support system  Outcome: Progressing     Problem: Knowledge Deficit  Goal: Patient/family/caregiver demonstrates understanding of disease process, treatment plan, medications, and discharge instructions  Description  Complete learning assessment and assess knowledge base  Interventions:  - Provide teaching at level of understanding  - Provide teaching via preferred learning methods  Outcome: Progressing     Problem: Nutrition/Hydration-ADULT  Goal: Nutrient/Hydration intake appropriate for improving, restoring or maintaining nutritional needs  Description  Monitor and assess patient's nutrition/hydration status for malnutrition  Collaborate with interdisciplinary team and initiate plan and interventions as ordered  Monitor patient's weight and dietary intake as ordered or per policy  Utilize nutrition screening tool and intervene as necessary  Determine patient's food preferences and provide high-protein, high-caloric foods as appropriate       INTERVENTIONS:  - Monitor oral intake, urinary output, labs, and treatment plans  - Assess nutrition and hydration status and recommend course of action  - Evaluate amount of meals eaten  - Assist patient with eating if necessary   - Allow adequate time for meals  - Recommend/ encourage appropriate diets, oral nutritional supplements, and vitamin/mineral supplements  - Order, calculate, and assess calorie counts as needed  - Recommend, monitor, and adjust tube feedings and TPN/PPN based on assessed needs  - Assess need for intravenous fluids  - Provide specific nutrition/hydration education as appropriate  - Include patient/family/caregiver in decisions related to nutrition  Outcome: Progressing     Problem: CARDIOVASCULAR - ADULT  Goal: Maintains optimal cardiac output and hemodynamic stability  Description  INTERVENTIONS:  - Monitor I/O, vital signs and rhythm  - Monitor for S/S and trends of decreased cardiac output  - Administer and titrate ordered vasoactive medications to optimize hemodynamic stability  - Assess quality of pulses, skin color and temperature  - Assess for signs of decreased coronary artery perfusion  - Instruct patient to report change in severity of symptoms  Outcome: Progressing  Goal: Absence of cardiac dysrhythmias or at baseline rhythm  Description  INTERVENTIONS:  - Continuous cardiac monitoring, vital signs, obtain 12 lead EKG if ordered  - Administer antiarrhythmic and heart rate control medications as ordered  - Monitor electrolytes and administer replacement therapy as ordered  Outcome: Progressing     Problem: RESPIRATORY - ADULT  Goal: Achieves optimal ventilation and oxygenation  Description  INTERVENTIONS:  - Assess for changes in respiratory status  - Assess for changes in mentation and behavior  - Position to facilitate oxygenation and minimize respiratory effort  - Oxygen administered by appropriate delivery if ordered  - Initiate smoking cessation education as indicated  - Encourage broncho-pulmonary hygiene including cough, deep breathe, Incentive Spirometry  - Assess the need for suctioning and aspirate as needed  - Assess and instruct to report SOB or any respiratory difficulty  - Respiratory Therapy support as indicated  Outcome: Progressing

## 2020-07-26 LAB
PROT S ACT/NOR PPP: 109 % (ref 63–140)
PROT S ACT/NOR PPP: 124 % (ref 57–157)
PROT S PPP-ACNC: 93 % (ref 60–150)

## 2020-07-27 LAB
B2 GLYCOPROT1 IGA SER-ACNC: <9 GPI IGA UNITS (ref 0–25)
B2 GLYCOPROT1 IGG SER-ACNC: <9 GPI IGG UNITS (ref 0–20)
B2 GLYCOPROT1 IGM SER-ACNC: <9 GPI IGM UNITS (ref 0–32)

## 2020-07-29 LAB
APTT SCREEN TO CONFIRM RATIO: 0.85 RATIO (ref 0–1.4)
CONFIRM APTT/NORMAL: 45.7 SEC (ref 0–55)
DRVVT IMM 1:2 NP PPP: 41.5 SEC (ref 0–47)
LA PPP-IMP: ABNORMAL
SCREEN APTT: 45.9 SEC (ref 0–51.9)
SCREEN DRVVT: 58.6 SEC (ref 0–47)
THROMBIN TIME: 103.3 SEC (ref 0–23)
TT IMM NP PPP: 67.5 SEC (ref 0–23)
TT P HPASE PPP: 19.8 SEC (ref 0–23)

## 2020-07-31 LAB — F5 GENE MUT ANL BLD/T: NORMAL

## 2020-08-03 LAB — F2 GENE MUT ANL BLD/T: NORMAL

## 2020-08-25 ENCOUNTER — OFFICE VISIT (OUTPATIENT)
Dept: PULMONOLOGY | Facility: CLINIC | Age: 50
End: 2020-08-25
Payer: COMMERCIAL

## 2020-08-25 VITALS
SYSTOLIC BLOOD PRESSURE: 118 MMHG | BODY MASS INDEX: 27.82 KG/M2 | HEART RATE: 63 BPM | HEIGHT: 63 IN | OXYGEN SATURATION: 99 % | TEMPERATURE: 97.8 F | DIASTOLIC BLOOD PRESSURE: 80 MMHG | WEIGHT: 157 LBS

## 2020-08-25 DIAGNOSIS — I26.99 OTHER ACUTE PULMONARY EMBOLISM WITHOUT ACUTE COR PULMONALE (HCC): Primary | ICD-10-CM

## 2020-08-25 DIAGNOSIS — R07.81 PLEURITIC CHEST PAIN: ICD-10-CM

## 2020-08-25 DIAGNOSIS — I26.99 PULMONARY INFARCTION (HCC): ICD-10-CM

## 2020-08-25 PROCEDURE — 99244 OFF/OP CNSLTJ NEW/EST MOD 40: CPT | Performed by: INTERNAL MEDICINE

## 2020-08-25 NOTE — PROGRESS NOTES
Pulmonary Consultation   Kristina Koo 44 y o  female MRN: [id number]    Encounter: [id number]      Reason for consultation:   Pulmonary embolism    Requesting physician:  Paresh Alejo MD       Impressions:   · Acute pulmonary embolism  · Pulmonary infarct  · Pleuritic chest pain  · History of breast cancer status post lumpectomy and bilateral mastectomy at a later time  Recommendations:  · Anticoagulation with Eliquis as long as she is on the tamoxifen  · Follow-up with Hematology-Oncology  · No further workup is needed at this time  · Follow-up as needed  Discussion:  The patient has acute pulmonary embolism most likely is provoked by the tamoxifen  She needs the tamoxifen because of her breast cancer recurrence risk  I recommend to continue the Eliquis as long as she requires the tamoxifen  The patient is scheduled to follow-up with her medical oncology doctor next week  I have recommended that she bring her attention to the most recent labs regarding the thrombosis profile  The right lower chest discomfort is because of pleurisy from the pulmonary infarct  It is improving and it will eventually subsides  The patient is 2D echo was normal   She had no DVT  I have not scheduled her for another appointment however I will be happy to see her in the future if the need arises  History of Present Illness   HPI:  Kristina Koo is a 44 y o  female who is here for evaluation of acute pulmonary embolism management  About a month ago the patient developed shortness of breath  She was evaluated initially with chest radiographs which were unremarkable  The patient was found to be hypoxic in an Urgent Center and she was referred to the emergency department on July 22nd  She had CT angio which showed acute pulmonary embolism  The patient was admitted to the hospital and initially was evaluated for possible lytic therapy however she was not a candidate    The patient was treated with heparin and transition to Eliquis  Her shortness of breath has resolved  She also developed a right lower chest pain which has improved  She still has discomfort when she takes a deep breath or coughs  Currently she is on Eliquis  The patient has history of breast cancer diagnosed in March of 2019  Initially she had a lumpectomy then followed by with bilateral mastectomy  She had reconstruction surgery  The last surgery done for her was in March of 2020  The patient is on tamoxifen for the breast cancer  The patient denies shortness of breath  Denies cough or sputum production  Review of systems:  12 point review of systems was completed and was otherwise negative except as listed in HPI  Historical Information   Past Medical History:   Diagnosis Date    Allergic rhinitis     Breast CA ([address] )     IgA nephropathy      Past Surgical History:   Procedure Laterality Date    BREAST BIOPSY Left     l breast    IR BIOPSY KIDNEY MASS  12/19/2018    MASTECTOMY Bilateral 07/25/2019     No family history on file  Family History:  No history of chronic lung disease in the family  No history of DVT or PE in the family  Social History:  The patient lives with her parents  She is a  at a school  She is a lifelong nonsmoker  She was drinking alcohol occasionally  She has 2 dogs  Meds/Allergies   No current facility-administered medications for this visit        (Not in a hospital admission)    Allergies   Allergen Reactions    Bactrim [Sulfamethoxazole-Trimethoprim]     Sulfasalazine Rash       Vitals: Blood pressure 118/80, pulse 63, temperature 97 8 °F (36 6 °C), temperature source Temporal, height 5' 3" (1 6 m), weight 71 2 kg (157 lb), SpO2 99 %, not currently breastfeeding ,      Physical exam:        Head/eyes:    Normocephalic, without obvious abnormality, atraumatic,         PERRL, extraocular muscles intact, no scleral icterus    Nose:   Nares normal, septum midline, mucosa normal, no drainage    or sinus tenderness   Throat:   Moist mucous membranes, no thrush   Neck:   Supple, trachea midline, no adenopathy; no carotid    bruit or JVD   Lungs:     Clear breath sounds  No wheezing or rhonchi  Heart:    Regular rate and rhythm, S1 and S2 normal, no murmur, rub   or gallop   Abdomen:     Soft, non-tender, bowel sounds active all four quadrants,     no masses, no organomegaly   Extremities:   Extremities normal, atraumatic, no cyanosis or edema   Skin:   Warm, dry, turgor normal, no rashes or lesions   Neurologic:   CNII-XII intact, normal strength, non-focal             Imaging and other studies: I have personally reviewed pertinent films in PACS CT of the chest is reviewed on the [facility] system  It showed bilateral lower lobe pulmonary artery branches filling defect consistent with acute PE  It these more on the right side than the left  There is an opacity at the base of the right lung which is consistent with pulmonary infarct      Lab Results   Component Value Date    WBC 9 13 07/25/2020    HGB 11 4 (L) 07/25/2020    HCT 35 7 07/25/2020    MCV 87 07/25/2020     07/25/2020     Lab Results   Component Value Date    SODIUM 139 07/22/2020    K 3 8 07/22/2020     07/22/2020    CO2 25 07/22/2020    BUN 11 07/22/2020    CREATININE 1 15 07/22/2020    GLUC 98 07/22/2020    CALCIUM 8 8 07/22/2020             Elizabeth Michel MD    Answers for HPI/ROS submitted by the patient on 8/18/2020   Primary symptoms  Do you have chest tightness?: Yes  Chronicity: new  When did you first notice your symptoms?: more than 1 month ago  How often do your symptoms occur?: daily  Since you first noticed this problem, how has it changed?: gradually improving  Have you had a change in appetite?: No  Do you have chest pain?: No  Do you have shortness of breath that occurs with effort or exertion?: No  Do you have ear congestion?: No  Do you have ear pain?: No  Do you have a fever?: No  Do you have headaches?: Yes  Do you have heartburn?: No  Do you have fatigue?: Yes  Do you have muscle pain?: No  Do you have nasal congestion?: No  Do you have shortness of breath when lying flat?: Yes  Do you have shortness of breath when you wake up?: No  Do you have post-nasal drip?: No  Do you have a runny nose?: No  Do you have sneezing?: No  Do you have a sore throat?: No  Do you have sweats?: No  Do you have trouble swallowing?: No  Have you experienced weight loss?: No  Which of the following makes your symptoms worse?: change in weather, climbing stairs, lying down  Which of the following makes your symptoms better?: change in position  Risk factors for lung disease: animal exposure, smoking/tobacco exposure

## 2020-12-22 ENCOUNTER — HOSPITAL ENCOUNTER (EMERGENCY)
Facility: HOSPITAL | Age: 50
Discharge: HOME | End: 2020-12-23
Attending: EMERGENCY MEDICINE
Payer: COMMERCIAL

## 2020-12-22 DIAGNOSIS — N93.9 VAGINAL BLEEDING: Primary | ICD-10-CM

## 2020-12-22 LAB
ALBUMIN SERPL BCP-MCNC: 3.8 G/DL (ref 3.5–5.7)
ALP SERPL-CCNC: 79 U/L (ref 40–150)
ALT SERPL W P-5'-P-CCNC: 16 U/L (ref 7–52)
ANION GAP SERPL CALCULATED.3IONS-SCNC: 11 MMOL/L (ref 4–13)
APTT PPP: 34 SECONDS (ref 23–37)
AST SERPL W P-5'-P-CCNC: 17 U/L (ref 13–39)
BILIRUB SERPL-MCNC: 0.3 MG/DL (ref 0.2–1)
BUN SERPL-MCNC: 12 MG/DL (ref 7–25)
CALCIUM SERPL-MCNC: 8.8 MG/DL (ref 8.6–10.5)
CHLORIDE SERPL-SCNC: 106 MMOL/L (ref 98–107)
CO2 SERPL-SCNC: 23 MMOL/L (ref 21–31)
CREAT SERPL-MCNC: 1.1 MG/DL (ref 0.6–1.2)
ERYTHROCYTE [DISTWIDTH] IN BLOOD BY AUTOMATED COUNT: 14 % (ref 11.5–14.5)
GFR SERPL CREATININE-BSD FRML MDRD: 58 ML/MIN/1.73SQ M
GLUCOSE SERPL-MCNC: 87 MG/DL (ref 65–99)
HCT VFR BLD AUTO: 40 % (ref 42–47)
HGB BLD-MCNC: 13 G/DL (ref 12–16)
INR PPP: 1.45 (ref 0.84–1.19)
MCH RBC QN AUTO: 26.9 PG (ref 26–34)
MCHC RBC AUTO-ENTMCNC: 32.5 G/DL (ref 31–37)
MCV RBC AUTO: 83 FL (ref 81–99)
PLATELET # BLD AUTO: 272 THOUSANDS/UL (ref 149–390)
PMV BLD AUTO: 7.3 FL (ref 8.6–11.7)
POTASSIUM SERPL-SCNC: 3.8 MMOL/L (ref 3.5–5.5)
PROT SERPL-MCNC: 6.6 G/DL (ref 6.4–8.9)
PROTHROMBIN TIME: 17.5 SECONDS (ref 11.6–14.5)
RBC # BLD AUTO: 4.83 MILLION/UL (ref 3.9–5.2)
SODIUM SERPL-SCNC: 140 MMOL/L (ref 134–143)
WBC # BLD AUTO: 17 THOUSAND/UL (ref 4.8–10.8)

## 2020-12-22 PROCEDURE — 85730 THROMBOPLASTIN TIME PARTIAL: CPT | Performed by: EMERGENCY MEDICINE

## 2020-12-22 PROCEDURE — 36415 COLL VENOUS BLD VENIPUNCTURE: CPT | Performed by: EMERGENCY MEDICINE

## 2020-12-22 PROCEDURE — 99284 EMERGENCY DEPT VISIT MOD MDM: CPT

## 2020-12-22 PROCEDURE — 80053 COMPREHEN METABOLIC PANEL: CPT | Performed by: EMERGENCY MEDICINE

## 2020-12-22 PROCEDURE — 85027 COMPLETE CBC AUTOMATED: CPT | Performed by: EMERGENCY MEDICINE

## 2020-12-22 PROCEDURE — 99285 EMERGENCY DEPT VISIT HI MDM: CPT | Performed by: EMERGENCY MEDICINE

## 2020-12-22 PROCEDURE — 85610 PROTHROMBIN TIME: CPT | Performed by: EMERGENCY MEDICINE

## 2020-12-22 PROCEDURE — 85007 BL SMEAR W/DIFF WBC COUNT: CPT | Performed by: EMERGENCY MEDICINE

## 2020-12-23 ENCOUNTER — APPOINTMENT (EMERGENCY)
Dept: CT IMAGING | Facility: HOSPITAL | Age: 50
End: 2020-12-23
Payer: COMMERCIAL

## 2020-12-23 VITALS
SYSTOLIC BLOOD PRESSURE: 114 MMHG | DIASTOLIC BLOOD PRESSURE: 66 MMHG | RESPIRATION RATE: 18 BRPM | HEART RATE: 78 BPM | OXYGEN SATURATION: 100 % | TEMPERATURE: 98.3 F

## 2020-12-23 LAB
BASOPHILS # BLD AUTO: 0.17 THOUSAND/UL (ref 0–0.1)
BASOPHILS NFR MAR MANUAL: 1 % (ref 0–1)
EOSINOPHIL # BLD AUTO: 3.06 THOUSAND/UL (ref 0–0.61)
EOSINOPHIL NFR BLD MANUAL: 18 % (ref 0–6)
LYMPHOCYTES # BLD AUTO: 17 % (ref 20–51)
LYMPHOCYTES # BLD AUTO: 2.89 THOUSAND/UL (ref 0.6–4.47)
MONOCYTES # BLD AUTO: 0.85 THOUSAND/UL (ref 0–1.22)
MONOCYTES NFR BLD AUTO: 5 % (ref 4–12)
NEUTS SEG # BLD: 10.03 THOUSAND/UL (ref 1.81–6.82)
NEUTS SEG NFR BLD AUTO: 59 % (ref 42–75)
PLATELET BLD QL SMEAR: ADEQUATE
RBC MORPH BLD: NORMAL
TOTAL CELLS COUNTED SPEC: 100

## 2020-12-23 PROCEDURE — 74177 CT ABD & PELVIS W/CONTRAST: CPT

## 2020-12-23 PROCEDURE — G1004 CDSM NDSC: HCPCS

## 2020-12-23 RX ADMIN — IOHEXOL 100 ML: 350 INJECTION, SOLUTION INTRAVENOUS at 00:13

## 2021-02-05 ENCOUNTER — OFFICE VISIT (OUTPATIENT)
Dept: INTERNAL MEDICINE CLINIC | Facility: CLINIC | Age: 51
End: 2021-02-05
Payer: COMMERCIAL

## 2021-02-05 ENCOUNTER — TELEPHONE (OUTPATIENT)
Dept: ADMINISTRATIVE | Facility: OTHER | Age: 51
End: 2021-02-05

## 2021-02-05 VITALS
WEIGHT: 166.6 LBS | TEMPERATURE: 100.4 F | HEIGHT: 64 IN | SYSTOLIC BLOOD PRESSURE: 114 MMHG | OXYGEN SATURATION: 98 % | BODY MASS INDEX: 28.44 KG/M2 | DIASTOLIC BLOOD PRESSURE: 82 MMHG | RESPIRATION RATE: 14 BRPM | HEART RATE: 69 BPM

## 2021-02-05 DIAGNOSIS — I10 ESSENTIAL HYPERTENSION: ICD-10-CM

## 2021-02-05 DIAGNOSIS — Z12.11 SCREEN FOR COLON CANCER: Primary | ICD-10-CM

## 2021-02-05 DIAGNOSIS — C50.012 BILATERAL MALIGNANT NEOPLASM OF AREOLA OF BREAST IN FEMALE, UNSPECIFIED ESTROGEN RECEPTOR STATUS (HCC): ICD-10-CM

## 2021-02-05 DIAGNOSIS — E78.2 MIXED HYPERLIPIDEMIA: ICD-10-CM

## 2021-02-05 DIAGNOSIS — I26.99 BILATERAL PULMONARY EMBOLISM (HCC): ICD-10-CM

## 2021-02-05 DIAGNOSIS — C50.011 BILATERAL MALIGNANT NEOPLASM OF AREOLA OF BREAST IN FEMALE, UNSPECIFIED ESTROGEN RECEPTOR STATUS (HCC): ICD-10-CM

## 2021-02-05 PROCEDURE — 3725F SCREEN DEPRESSION PERFORMED: CPT | Performed by: NURSE PRACTITIONER

## 2021-02-05 PROCEDURE — 1036F TOBACCO NON-USER: CPT | Performed by: NURSE PRACTITIONER

## 2021-02-05 PROCEDURE — 99204 OFFICE O/P NEW MOD 45 MIN: CPT | Performed by: NURSE PRACTITIONER

## 2021-02-05 RX ORDER — ANASTROZOLE 1 MG/1
TABLET ORAL
COMMUNITY
Start: 2020-08-27

## 2021-02-05 RX ORDER — CHOLECALCIFEROL (VITAMIN D3) 125 MCG
2000 CAPSULE ORAL DAILY
COMMUNITY

## 2021-02-05 NOTE — TELEPHONE ENCOUNTER
----- Message from Alina Chong sent at 2/5/2021  9:41 AM EST -----  02/05/21 9:41 AM    Hello, our patient Herman Kauffman has had Pap Smear (HPV) aka Cervical Cancer Screening completed/performed  Please assist in updating the patient chart by pulling the Care Everywhere (CE) document  The date of service is 1/14/2020       Thank you,  Rosa Maria Paredes PG [facility]

## 2021-02-05 NOTE — PROGRESS NOTES
Assessment/Plan:    Breast cancer   · Continue with oncology visits  · Continue arimidex     Mixed hyperlipidemia  · Check lipid levels    Screen for colon cancer  · Screening information given    Essential hypertension  · Continue lisinopril     Bilateral pulmonary emboli  · Continue with eliquis  · Check PT INR       Diagnoses and all orders for this visit:    Screen for colon cancer  -     Ambulatory referral to Gastroenterology; Future    Essential hypertension    Mixed hyperlipidemia  -     Lipid panel; Future    Bilateral malignant neoplasm of areola of breast in female, unspecified estrogen receptor status ([facility] [address] )  -     Comprehensive metabolic panel; Future  -     CBC and differential; Future    Bilateral pulmonary emboli  -     Vitamin D 25 hydroxy; Future  -     Protime-INR; Future    Other orders  -     anastrozole (Arimidex) 1 mg tablet  -     Calcium Carbonate-Vit D-Min (CALCIUM 1200 PO); Take by mouth daily  -     Cholecalciferol (Vitamin D3) 50 MCG ( UT) TABS; Take 2,000 Units by mouth daily          Subjective:      Patient ID: Merari Cruz is a 44 y o  female  [facility]    NAME: Merari Cruz  AGE: 44 y o   SEX: female  : 1970     DATE: 2021    Assessment and Plan:    Problem List Items Addressed This Visit      Cardiovascular and Mediastinum   Bilateral pulmonary emboli    · Continue with eliquis  · Check PT INR      Relevant Orders   Vitamin D 25 hydroxy   Protime-INR   Essential hypertension    · Continue lisinopril         Other   Breast cancer     · Continue with oncology visits  · Continue arimidex       Relevant Medications   anastrozole (Arimidex) 1 mg tablet   Other Relevant Orders   Comprehensive metabolic panel   CBC and differential   Mixed hyperlipidemia    · Check lipid levels      Relevant Orders   Lipid panel   Screen for colon cancer - Primary    · Screening information given      Relevant Orders   Ambulatory referral to Gastroenterology       Immunizations and preventive care screenings were discussed with patient today  Appropriate education was printed on patient's after visit summary  Counseling:  Alcohol/drug use: discussed moderation in alcohol intake, the recommendations for healthy alcohol use, and avoidance of illicit drug use  Dental Health: discussed importance of regular tooth brushing, flossing, and dental visits  Injury prevention: discussed safety/seat belts, safety helmets, smoke detectors, carbon dioxide detectors, and smoking near bedding or upholstery  Sexual health: discussed sexually transmitted diseases, partner selection, use of condoms, avoidance of unintended pregnancy, and contraceptive alternatives  Exercise: the importance of regular exercise/physical activity was discussed  Recommend exercise 3-5 times per week for at least 30 minutes  [unfilled]    Return in about 3 months (around 5/5/2021) for Recheck  Chief Complaint:    Patient presents with:  Establish Care: NEWP to be established  DS/PHQ-9: Negative  H/O Breast CA double mastectomy  History of Present Illness:    Adult Annual Physical   Patient here for a comprehensive physical exam  The patient reports no problems  Diet and Physical Activity  Diet/Nutrition: well balanced diet  Exercise: 3-4 times a week on average  Depression Screening  PHQ-9 Depression Screening    PHQ-9:   Frequency of the following problems over the past two weeks:     Little interest or pleasure in doing things: 0 - not at all  Feeling down, depressed, or hopeless: 0 - not at all  PHQ-2 Score: 0    General Health  Sleep: sleeps well  Hearing: normal - bilateral   Vision: no vision problems  Dental: regular dental visits  /GYN Health  Patient is: postmenopausal  Last menstrual period: 12/2020  Contraceptive method: not applicable      Review of Systems:    Past Medical History:    Past Medical History:  No date: Allergic rhinitis  No date: Breast CA ([facility] [address] )  No date: Cancer ([facility] )  No date: IgA nephropathy  Past Surgical History:    Past Surgical History:  No date: BREAST BIOPSY;  Left     Comment:  l breast  No date: ENDOMETRIAL BIOPSY  12/19/2018: IR BIOPSY KIDNEY MASS  07/25/2019: MASTECTOMY; Bilateral  Social History:    E-Cigarette/Vaping  E-Cigarette Use: Never User    E-Cigarette/Vaping Substances  Nicotine: No  THC: No  CBD: No  Flavoring: No  Other: No  Unknown: No      Social History   Socioeconomic History     Marital status:      Spouse name: None     Number of children: None     Years of education: None     Highest education level: None   Occupational History     Occupation: EMPLOYED   Social Needs     Financial resource strain: None     Food insecurity       Worry: None       Inability: None     Transportation needs       Medical: None       Non-medical: None   Tobacco Use     Smoking status: Never Smoker     Smokeless tobacco: Never Used   Substance and Sexual Activity     Alcohol use: Yes       Comment: socially     Drug use: No     Sexual activity: Not Currently       Partners: Male   Lifestyle     Physical activity       Days per week: None       Minutes per session: None     Stress: None   Relationships     Social connections       Talks on phone: None       Gets together: None       Attends Synagogue service: None       Active member of club or organization: None       Attends meetings of clubs or organizations: None       Relationship status: None     Intimate partner violence       Fear of current or ex partner: None       Emotionally abused: None       Physically abused: None       Forced sexual activity: None   Other Topics     Concerns:       None   Social History Narrative     EMPLOYED         Family History:    Review of patient's family history indicates:  Problem: Breast cancer     Relation: Mother         Age of Onset: (Not Specified)  Problem: Hernia Relation: Father         Age of Onset: (Not Specified)    Current Medications:    Current Outpatient Medications:  anastrozole (Arimidex) 1 mg tablet, , Disp: , Rfl:   apixaban (ELIQUIS) 5 mg, Take 2 tablets (10 mg total) by mouth 2 (two) times a day for 7 days, THEN 1 tablet (5 mg total) 2 (two) times a day for 23 days  , Disp: 74 tablet, Rfl: 0  Calcium Carbonate-Vit D-Min (CALCIUM 1200 PO), Take by mouth daily, Disp: , Rfl:   Cholecalciferol (Vitamin D3) 50 MCG (2000 UT) TABS, Take 2,000 Units by mouth daily, Disp: , Rfl:   levocetirizine (Xyzal) 5 MG tablet, Take 5 mg by mouth every evening, Disp: , Rfl:   lisinopril (ZESTRIL) 10 mg tablet, , Disp: , Rfl:   simvastatin (ZOCOR) 40 mg tablet, , Disp: , Rfl:     No current facility-administered medications for this visit  Allergies:    -- Bactrim (Sulfamethoxazole-Trimethoprim)   -- Sulfasalazine -- Rash    /82 (BP Location: Left arm, Patient Position: Sitting, Cuff Size: Standard)   Pulse 69   Temp 100 4 °F (38 °C)   Resp 14   Ht 5' 4" (1 626 m)   Wt 75 6 kg (166 lb 9 6 oz)   LMP 07/27/2019   SpO2 98%   BMI 28 60 kg/m²       CASSIDY Nicolas  [facility]        The following portions of the patient's history were reviewed and updated as appropriate: allergies, current medications, past family history, past medical history, past social history, past surgical history and problem list     Review of Systems   Constitutional: Negative for activity change, chills, fatigue and fever  Respiratory: Negative for cough and shortness of breath  Cardiovascular: Negative for chest pain, palpitations and leg swelling  Gastrointestinal: Negative for abdominal pain  Genitourinary: Negative for difficulty urinating  Musculoskeletal: Negative for myalgias  Neurological: Negative for weakness and headaches  Psychiatric/Behavioral: Negative for sleep disturbance  The patient is not nervous/anxious      All other systems reviewed and are negative  Objective:      /82 (BP Location: Left arm, Patient Position: Sitting, Cuff Size: Standard)   Pulse 69   Temp 100 4 °F (38 °C)   Resp 14   Ht 5' 4" (1 626 m)   Wt 75 6 kg (166 lb 9 6 oz)   LMP 07/27/2019   SpO2 98%   BMI 28 60 kg/m²          Physical Exam  Vitals signs reviewed  Constitutional:       General: She is awake  Appearance: Normal appearance  She is well-developed  HENT:      Head: Normocephalic and atraumatic  Nose: Nose normal       Mouth/Throat:      Mouth: Mucous membranes are moist    Eyes:      Extraocular Movements: Extraocular movements intact  Neck:      Musculoskeletal: Normal range of motion  Cardiovascular:      Rate and Rhythm: Normal rate and regular rhythm  Pulses: Normal pulses  Heart sounds: Normal heart sounds  Pulmonary:      Effort: Pulmonary effort is normal       Breath sounds: Normal breath sounds  Chest:      Comments: Bilateral mastectomy with reconstruction  Abdominal:      General: Bowel sounds are normal       Palpations: Abdomen is soft  Musculoskeletal: Normal range of motion  Right lower leg: No edema  Left lower leg: No edema  Skin:     General: Skin is warm and dry  Neurological:      Mental Status: She is alert and oriented to person, place, and time  Psychiatric:         Attention and Perception: Attention normal          Mood and Affect: Mood normal          Speech: Speech normal          Behavior: Behavior normal  Behavior is cooperative  BMI Counseling: Body mass index is 28 6 kg/m²  The BMI is above normal  Nutrition recommendations include decreasing portion sizes, encouraging healthy choices of fruits and vegetables, decreasing fast food intake, consuming healthier snacks and limiting drinks that contain sugar  Exercise recommendations include exercising 3-5 times per week  No pharmacotherapy was ordered

## 2021-02-08 ENCOUNTER — LAB (OUTPATIENT)
Dept: LAB | Facility: CLINIC | Age: 51
End: 2021-02-08
Payer: COMMERCIAL

## 2021-02-08 ENCOUNTER — TRANSCRIBE ORDERS (OUTPATIENT)
Dept: LAB | Facility: CLINIC | Age: 51
End: 2021-02-08

## 2021-02-08 DIAGNOSIS — C50.012 BILATERAL MALIGNANT NEOPLASM OF AREOLA OF BREAST IN FEMALE, UNSPECIFIED ESTROGEN RECEPTOR STATUS (HCC): ICD-10-CM

## 2021-02-08 DIAGNOSIS — I26.99 BILATERAL PULMONARY EMBOLISM (HCC): ICD-10-CM

## 2021-02-08 DIAGNOSIS — C50.011 BILATERAL MALIGNANT NEOPLASM OF AREOLA OF BREAST IN FEMALE, UNSPECIFIED ESTROGEN RECEPTOR STATUS (HCC): ICD-10-CM

## 2021-02-08 DIAGNOSIS — E78.2 MIXED HYPERLIPIDEMIA: ICD-10-CM

## 2021-02-08 LAB
25(OH)D3 SERPL-MCNC: 47.3 NG/ML (ref 30–100)
ALBUMIN SERPL BCP-MCNC: 3.9 G/DL (ref 3.5–5)
ALP SERPL-CCNC: 66 U/L (ref 46–116)
ALT SERPL W P-5'-P-CCNC: 24 U/L (ref 12–78)
ANION GAP SERPL CALCULATED.3IONS-SCNC: 5 MMOL/L (ref 4–13)
AST SERPL W P-5'-P-CCNC: 21 U/L (ref 5–45)
BASOPHILS # BLD AUTO: 0.07 THOUSANDS/ΜL (ref 0–0.1)
BASOPHILS NFR BLD AUTO: 1 % (ref 0–1)
BILIRUB SERPL-MCNC: 0.39 MG/DL (ref 0.2–1)
BUN SERPL-MCNC: 18 MG/DL (ref 5–25)
CALCIUM SERPL-MCNC: 9.3 MG/DL (ref 8.3–10.1)
CHLORIDE SERPL-SCNC: 108 MMOL/L (ref 100–108)
CHOLEST SERPL-MCNC: 149 MG/DL (ref 50–200)
CO2 SERPL-SCNC: 28 MMOL/L (ref 21–32)
CREAT SERPL-MCNC: 1.19 MG/DL (ref 0.6–1.3)
EOSINOPHIL # BLD AUTO: 0.7 THOUSAND/ΜL (ref 0–0.61)
EOSINOPHIL NFR BLD AUTO: 12 % (ref 0–6)
ERYTHROCYTE [DISTWIDTH] IN BLOOD BY AUTOMATED COUNT: 13.2 % (ref 11.6–15.1)
GFR SERPL CREATININE-BSD FRML MDRD: 53 ML/MIN/1.73SQ M
GLUCOSE P FAST SERPL-MCNC: 88 MG/DL (ref 65–99)
HCT VFR BLD AUTO: 40.5 % (ref 34.8–46.1)
HDLC SERPL-MCNC: 67 MG/DL
HGB BLD-MCNC: 12.5 G/DL (ref 11.5–15.4)
IMM GRANULOCYTES # BLD AUTO: 0.01 THOUSAND/UL (ref 0–0.2)
IMM GRANULOCYTES NFR BLD AUTO: 0 % (ref 0–2)
INR PPP: 1.17 (ref 0.84–1.19)
LDLC SERPL CALC-MCNC: 66 MG/DL (ref 0–100)
LYMPHOCYTES # BLD AUTO: 1.66 THOUSANDS/ΜL (ref 0.6–4.47)
LYMPHOCYTES NFR BLD AUTO: 29 % (ref 14–44)
MCH RBC QN AUTO: 26 PG (ref 26.8–34.3)
MCHC RBC AUTO-ENTMCNC: 30.9 G/DL (ref 31.4–37.4)
MCV RBC AUTO: 84 FL (ref 82–98)
MONOCYTES # BLD AUTO: 0.42 THOUSAND/ΜL (ref 0.17–1.22)
MONOCYTES NFR BLD AUTO: 7 % (ref 4–12)
NEUTROPHILS # BLD AUTO: 2.88 THOUSANDS/ΜL (ref 1.85–7.62)
NEUTS SEG NFR BLD AUTO: 51 % (ref 43–75)
NONHDLC SERPL-MCNC: 82 MG/DL
NRBC BLD AUTO-RTO: 0 /100 WBCS
PLATELET # BLD AUTO: 264 THOUSANDS/UL (ref 149–390)
PMV BLD AUTO: 10 FL (ref 8.9–12.7)
POTASSIUM SERPL-SCNC: 4.3 MMOL/L (ref 3.5–5.3)
PROT SERPL-MCNC: 7.7 G/DL (ref 6.4–8.2)
PROTHROMBIN TIME: 14.9 SECONDS (ref 11.6–14.5)
RBC # BLD AUTO: 4.81 MILLION/UL (ref 3.81–5.12)
SODIUM SERPL-SCNC: 141 MMOL/L (ref 136–145)
TRIGL SERPL-MCNC: 82 MG/DL
WBC # BLD AUTO: 5.74 THOUSAND/UL (ref 4.31–10.16)

## 2021-02-08 PROCEDURE — 85025 COMPLETE CBC W/AUTO DIFF WBC: CPT

## 2021-02-08 PROCEDURE — 80053 COMPREHEN METABOLIC PANEL: CPT

## 2021-02-08 PROCEDURE — 36415 COLL VENOUS BLD VENIPUNCTURE: CPT

## 2021-02-08 PROCEDURE — 85610 PROTHROMBIN TIME: CPT

## 2021-02-08 PROCEDURE — 80061 LIPID PANEL: CPT

## 2021-02-08 PROCEDURE — 82306 VITAMIN D 25 HYDROXY: CPT

## 2021-02-10 ENCOUNTER — TELEPHONE (OUTPATIENT)
Dept: GASTROENTEROLOGY | Facility: MEDICAL CENTER | Age: 51
End: 2021-02-10

## 2021-02-24 ENCOUNTER — PREP FOR PROCEDURE (OUTPATIENT)
Dept: GASTROENTEROLOGY | Facility: CLINIC | Age: 51
End: 2021-02-24

## 2021-02-24 DIAGNOSIS — Z12.11 SCREENING FOR COLON CANCER: Primary | ICD-10-CM

## 2021-02-24 NOTE — TELEPHONE ENCOUNTER
Pt is scheduled for an oa colon with dr Erna Caballero on 3/15/21, I went over miralax with pt on phone and emailed to pt  Patient is aware she will need a  to and from   She will get a call the day before with an exact time for arrival   Pt is on eliquis I faxed a clearance to dr Jane Bills

## 2021-03-01 ENCOUNTER — TELEPHONE (OUTPATIENT)
Dept: GASTROENTEROLOGY | Facility: AMBULARY SURGERY CENTER | Age: 51
End: 2021-03-01

## 2021-03-01 NOTE — TELEPHONE ENCOUNTER
Patients GI provider:  Dr Raymon Reese    Number to return call: [phone]    Reason for call: Pt calling has a question regarding her eliquis prior to procedure       Scheduled procedure/appointment date if applicable: Apt/procedure 3/15/21

## 2021-03-01 NOTE — TELEPHONE ENCOUNTER
See 2/10/21 encounter  I should have sent this one to you  I see you faxed Dr Ngozi Dawkins about loris

## 2021-03-10 DIAGNOSIS — Z23 ENCOUNTER FOR IMMUNIZATION: ICD-10-CM

## 2021-03-15 ENCOUNTER — ANESTHESIA EVENT (OUTPATIENT)
Dept: GASTROENTEROLOGY | Facility: HOSPITAL | Age: 51
End: 2021-03-15

## 2021-03-15 ENCOUNTER — HOSPITAL ENCOUNTER (OUTPATIENT)
Dept: GASTROENTEROLOGY | Facility: HOSPITAL | Age: 51
Setting detail: OUTPATIENT SURGERY
Discharge: HOME | End: 2021-03-15
Attending: INTERNAL MEDICINE | Admitting: INTERNAL MEDICINE
Payer: COMMERCIAL

## 2021-03-15 ENCOUNTER — ANESTHESIA (OUTPATIENT)
Dept: GASTROENTEROLOGY | Facility: HOSPITAL | Age: 51
End: 2021-03-15

## 2021-03-15 VITALS
RESPIRATION RATE: 16 BRPM | HEIGHT: 63 IN | WEIGHT: 166 LBS | OXYGEN SATURATION: 99 % | TEMPERATURE: 96.9 F | SYSTOLIC BLOOD PRESSURE: 129 MMHG | DIASTOLIC BLOOD PRESSURE: 93 MMHG | HEART RATE: 82 BPM | BODY MASS INDEX: 29.41 KG/M2

## 2021-03-15 DIAGNOSIS — Z12.11 SCREENING FOR COLON CANCER: ICD-10-CM

## 2021-03-15 LAB
EXT PREGNANCY TEST URINE: NEGATIVE
EXT. CONTROL: NORMAL

## 2021-03-15 PROCEDURE — G0121 COLON CA SCRN NOT HI RSK IND: HCPCS | Performed by: INTERNAL MEDICINE

## 2021-03-15 PROCEDURE — 81025 URINE PREGNANCY TEST: CPT | Performed by: ANESTHESIOLOGY

## 2021-03-15 RX ORDER — PROPOFOL 10 MG/ML
INJECTION, EMULSION INTRAVENOUS AS NEEDED
Status: DISCONTINUED | OUTPATIENT
Start: 2021-03-15 | End: 2021-03-15

## 2021-03-15 RX ORDER — LIDOCAINE HYDROCHLORIDE 20 MG/ML
INJECTION, SOLUTION EPIDURAL; INFILTRATION; INTRACAUDAL; PERINEURAL AS NEEDED
Status: DISCONTINUED | OUTPATIENT
Start: 2021-03-15 | End: 2021-03-15

## 2021-03-15 RX ORDER — MULTIVITAMIN
1 TABLET ORAL DAILY
COMMUNITY

## 2021-03-15 RX ORDER — PROPOFOL 10 MG/ML
INJECTION, EMULSION INTRAVENOUS CONTINUOUS PRN
Status: DISCONTINUED | OUTPATIENT
Start: 2021-03-15 | End: 2021-03-15

## 2021-03-15 RX ORDER — SODIUM CHLORIDE, SODIUM LACTATE, POTASSIUM CHLORIDE, CALCIUM CHLORIDE 600; 310; 30; 20 MG/100ML; MG/100ML; MG/100ML; MG/100ML
75 INJECTION, SOLUTION INTRAVENOUS CONTINUOUS
Status: DISCONTINUED | OUTPATIENT
Start: 2021-03-15 | End: 2021-03-15

## 2021-03-15 RX ADMIN — PROPOFOL 30 MG: 10 INJECTION, EMULSION INTRAVENOUS at 10:30

## 2021-03-15 RX ADMIN — LIDOCAINE HYDROCHLORIDE 50 MG: 20 INJECTION, SOLUTION EPIDURAL; INFILTRATION; INTRACAUDAL; PERINEURAL at 10:20

## 2021-03-15 RX ADMIN — PROPOFOL 130 MCG/KG/MIN: 10 INJECTION, EMULSION INTRAVENOUS at 10:20

## 2021-03-15 RX ADMIN — SODIUM CHLORIDE, SODIUM LACTATE, POTASSIUM CHLORIDE, AND CALCIUM CHLORIDE 75 ML/HR: .6; .31; .03; .02 INJECTION, SOLUTION INTRAVENOUS at 08:46

## 2021-03-15 RX ADMIN — PROPOFOL 100 MG: 10 INJECTION, EMULSION INTRAVENOUS at 10:20

## 2021-03-15 RX ADMIN — LIDOCAINE HYDROCHLORIDE 50 MG: 20 INJECTION, SOLUTION EPIDURAL; INFILTRATION; INTRACAUDAL; PERINEURAL at 10:11

## 2021-03-15 RX ADMIN — PROPOFOL 30 MG: 10 INJECTION, EMULSION INTRAVENOUS at 10:23

## 2021-03-15 NOTE — H&P
History and Physical -  [facility]  Alber Contreras 44 y o  female MRN: [id number]                  HPI: Alber Contreras is a 44y o  year old female who presents for screening colonoscopy  REVIEW OF SYSTEMS: Per the HPI, and otherwise unremarkable  Historical Information   Past Medical History:   Diagnosis Date    Allergic rhinitis     Breast CA ([facility] [address] )     [facility]     IgA nephropathy      Past Surgical History:   Procedure Laterality Date    BREAST BIOPSY Left     l breast    ENDOMETRIAL BIOPSY      IR BIOPSY KIDNEY MASS  12/19/2018    MASTECTOMY Bilateral 07/25/2019     Social History   Social History     Substance and Sexual Activity   Alcohol Use Yes    Comment: socially     Social History     Substance and Sexual Activity   Drug Use No     Social History     Tobacco Use   Smoking Status Never Smoker   Smokeless Tobacco Never Used     Family History   Problem Relation Age of Onset    Breast cancer Mother     Hernia Father        Meds/Allergies     (Not in a hospital admission)      Allergies   Allergen Reactions    Bactrim [Sulfamethoxazole-Trimethoprim]     Sulfasalazine Rash       Objective     Blood pressure 128/67, pulse 76, temperature 98 6 °F (37 °C), temperature source Temporal, resp  rate 16, height 5' 3" (1 6 m), weight 75 3 kg (166 lb), last menstrual period 07/27/2019, SpO2 99 %, not currently breastfeeding  PHYSICAL EXAMINATION:    General Appearance:   Alert, cooperative, no distress   HEENT:  Normocephalic, atraumatic, anicteric  Neck supple, symmetrical, trachea midline  Lungs:   Equal chest rise and unlabored breathing, normal effort, no coughing  Cardiovascular:   No visualized JVD  Abdomen:   No abdominal distension  Skin:   No jaundice, rashes, or lesions  Musculoskeletal:   Normal range of motion visualized  Psych:  Normal affect and normal insight  Neuro:  Alert and appropriate             ASSESSMENT/PLAN:  This is a 44 y o  year old female here for screening colonoscopy, and she is stable and optimized for her procedure

## 2021-03-15 NOTE — ANESTHESIA PREPROCEDURE EVALUATION
Procedure:  COLONOSCOPY    Relevant Problems   CARDIO   (+) Bilateral pulmonary emboli   (+) Essential hypertension   (+) Mixed hyperlipidemia      /RENAL   (+) IgA nephropathy      GYN   (+) Breast cancer    limb alert right arm 2/2 lymph node dissection (2019) - no issues with swelling in the past     H/o headache after one previous anacatesthetic      Physical Exam    Airway    Mallampati score: I  TM Distance: >3 FB  Neck ROM: full     Dental       Cardiovascular  Cardiovascular exam normal    Pulmonary  Pulmonary exam normal     Other Findings        Anesthesia Plan  ASA Score- 2     Anesthesia Type- IV sedation with anesthesia with ASA Monitors  Additional Monitors:   Airway Plan:           Plan Factors-    Chart reviewed  Patient summary reviewed  Induction- intravenous  Postoperative Plan-     Informed Consent- Anesthetic plan and risks discussed with patient  I personally reviewed this patient with the CRNA  Discussed and agreed on the Anesthesia Plan with the CRNA  Robbin Rodriguez

## 2021-03-15 NOTE — ANESTHESIA POSTPROCEDURE EVALUATION
Post-Op Assessment Note    CV Status:  Stable  Pain Score: 0    Pain management: adequate     Mental Status:  Arousable   Hydration Status:  Stable   PONV Controlled:  None   Airway Patency:  Patent      Post Op Vitals Reviewed: Yes      Staff: CRNA         No complications documented      BP   127/77   Temp      Pulse  79   Resp   23   SpO2   100

## 2021-03-15 NOTE — DISCHARGE INSTRUCTIONS
Colonoscopy   WHAT YOU NEED TO KNOW:   A colonoscopy is a procedure to examine the inside of your colon (intestine) with a scope  Polyps or tissue growths may have been removed during your colonoscopy  It is normal to feel bloated and to have some abdominal discomfort  You should be passing gas  If you have hemorrhoids or you had polyps removed, you may have a small amount of bleeding  DISCHARGE INSTRUCTIONS:   Seek care immediately if:   · You have a large amount of bright red blood in your bowel movements  · Your abdomen is hard and firm and you have severe pain  · You have sudden trouble breathing  Contact your healthcare provider if:   · You develop a rash or hives  · You have a fever within 24 hours of your procedure       · You have not had a bowel movement for 3 days after your procedure  · You have questions or concerns about your condition or care  Activity:   · Do not lift, strain, or run  for 3 days after your procedure  · Rest after your procedure  You have been given medicine to relax you  Do not  drive or make important decisions until the day after your procedure  Return to your normal activity as directed  · Relieve gas and discomfort from bloating  by lying on your right side with a heating pad on your abdomen  You may need to take short walks to help the gas move out  Eat small meals until bloating is relieved  If you had polyps removed: For 7 days after your procedure:  · Do not  take aspirin  · Do not  go on long car rides  Follow up with your healthcare provider as directed:  Write down your questions so you remember to ask them during your visits  © 2017 [employer] is for End User's use only and may not be sold, redistributed or otherwise used for commercial purposes  All illustrations and images included in CareNotes® are the copyrighted property of [employer]  or Alireza Carrillo    The above information is an  only  It is not intended as medical advice for individual conditions or treatments  Talk to your doctor, nurse or pharmacist before following any medical regimen to see if it is safe and effective for you

## 2021-03-18 ENCOUNTER — APPOINTMENT (EMERGENCY)
Dept: RADIOLOGY | Facility: HOSPITAL | Age: 51
End: 2021-03-18
Payer: COMMERCIAL

## 2021-03-18 ENCOUNTER — HOSPITAL ENCOUNTER (OUTPATIENT)
Facility: HOSPITAL | Age: 51
Setting detail: OBSERVATION
Discharge: HOME | End: 2021-03-19
Attending: EMERGENCY MEDICINE | Admitting: HOSPITALIST
Payer: COMMERCIAL

## 2021-03-18 ENCOUNTER — APPOINTMENT (EMERGENCY)
Dept: CT IMAGING | Facility: HOSPITAL | Age: 51
End: 2021-03-18
Payer: COMMERCIAL

## 2021-03-18 DIAGNOSIS — R07.9 CHEST PAIN: Primary | ICD-10-CM

## 2021-03-18 PROBLEM — R93.89 ABNORMAL CT SCAN: Status: ACTIVE | Noted: 2021-03-18

## 2021-03-18 PROBLEM — Z86.711 HISTORY OF PULMONARY EMBOLISM: Status: ACTIVE | Noted: 2020-07-22

## 2021-03-18 PROBLEM — N18.31 STAGE 3A CHRONIC KIDNEY DISEASE (HCC): Status: ACTIVE | Noted: 2021-03-18

## 2021-03-18 LAB
ALBUMIN SERPL BCP-MCNC: 4 G/DL (ref 3.5–5.7)
ALP SERPL-CCNC: 51 U/L (ref 40–150)
ALT SERPL W P-5'-P-CCNC: 14 U/L (ref 7–52)
ANION GAP SERPL CALCULATED.3IONS-SCNC: 9 MMOL/L (ref 4–13)
APTT PPP: 33 SECONDS (ref 23–37)
AST SERPL W P-5'-P-CCNC: 17 U/L (ref 13–39)
BASOPHILS # BLD AUTO: 0.1 THOUSANDS/ΜL (ref 0–0.1)
BASOPHILS NFR BLD AUTO: 1 % (ref 0–2)
BILIRUB SERPL-MCNC: 0.5 MG/DL (ref 0.2–1)
BUN SERPL-MCNC: 17 MG/DL (ref 7–25)
CALCIUM SERPL-MCNC: 8.9 MG/DL (ref 8.6–10.5)
CHLORIDE SERPL-SCNC: 105 MMOL/L (ref 98–107)
CO2 SERPL-SCNC: 28 MMOL/L (ref 21–31)
CREAT SERPL-MCNC: 1.17 MG/DL (ref 0.6–1.2)
D DIMER PPP FEU-MCNC: 0.33 UG/ML FEU
EOSINOPHIL # BLD AUTO: 0.2 THOUSAND/ΜL (ref 0–0.61)
EOSINOPHIL NFR BLD AUTO: 2 % (ref 0–5)
ERYTHROCYTE [DISTWIDTH] IN BLOOD BY AUTOMATED COUNT: 15.5 % (ref 11.5–14.5)
GFR SERPL CREATININE-BSD FRML MDRD: 54 ML/MIN/1.73SQ M
GLUCOSE SERPL-MCNC: 88 MG/DL (ref 65–99)
HCT VFR BLD AUTO: 36.6 % (ref 42–47)
HGB BLD-MCNC: 11.8 G/DL (ref 12–16)
INR PPP: 1.25 (ref 0.84–1.19)
LYMPHOCYTES # BLD AUTO: 1.5 THOUSANDS/ΜL (ref 0.6–4.47)
LYMPHOCYTES NFR BLD AUTO: 19 % (ref 21–51)
MCH RBC QN AUTO: 25.8 PG (ref 26–34)
MCHC RBC AUTO-ENTMCNC: 32.3 G/DL (ref 31–37)
MCV RBC AUTO: 80 FL (ref 81–99)
MONOCYTES # BLD AUTO: 0.6 THOUSAND/ΜL (ref 0.17–1.22)
MONOCYTES NFR BLD AUTO: 7 % (ref 2–12)
NEUTROPHILS # BLD AUTO: 5.8 THOUSANDS/ΜL (ref 1.4–6.5)
NEUTS SEG NFR BLD AUTO: 71 % (ref 42–75)
PLATELET # BLD AUTO: 218 THOUSANDS/UL (ref 149–390)
PMV BLD AUTO: 7.1 FL (ref 8.6–11.7)
POTASSIUM SERPL-SCNC: 4.5 MMOL/L (ref 3.5–5.5)
PROT SERPL-MCNC: 6.6 G/DL (ref 6.4–8.9)
PROTHROMBIN TIME: 15.5 SECONDS (ref 11.6–14.5)
RBC # BLD AUTO: 4.59 MILLION/UL (ref 3.9–5.2)
SODIUM SERPL-SCNC: 142 MMOL/L (ref 134–143)
TROPONIN I SERPL-MCNC: <0.03 NG/ML
TSH SERPL DL<=0.05 MIU/L-ACNC: 2.34 UIU/ML (ref 0.45–5.33)
WBC # BLD AUTO: 8.1 THOUSAND/UL (ref 4.8–10.8)

## 2021-03-18 PROCEDURE — 85730 THROMBOPLASTIN TIME PARTIAL: CPT | Performed by: EMERGENCY MEDICINE

## 2021-03-18 PROCEDURE — 84443 ASSAY THYROID STIM HORMONE: CPT | Performed by: EMERGENCY MEDICINE

## 2021-03-18 PROCEDURE — 80053 COMPREHEN METABOLIC PANEL: CPT | Performed by: EMERGENCY MEDICINE

## 2021-03-18 PROCEDURE — 71275 CT ANGIOGRAPHY CHEST: CPT

## 2021-03-18 PROCEDURE — 84484 ASSAY OF TROPONIN QUANT: CPT | Performed by: EMERGENCY MEDICINE

## 2021-03-18 PROCEDURE — 85025 COMPLETE CBC W/AUTO DIFF WBC: CPT | Performed by: EMERGENCY MEDICINE

## 2021-03-18 PROCEDURE — 36415 COLL VENOUS BLD VENIPUNCTURE: CPT | Performed by: EMERGENCY MEDICINE

## 2021-03-18 PROCEDURE — G1004 CDSM NDSC: HCPCS

## 2021-03-18 PROCEDURE — 99285 EMERGENCY DEPT VISIT HI MDM: CPT

## 2021-03-18 PROCEDURE — 99220 PR INITIAL OBSERVATION CARE/DAY 70 MINUTES: CPT | Performed by: FAMILY MEDICINE

## 2021-03-18 PROCEDURE — 84484 ASSAY OF TROPONIN QUANT: CPT | Performed by: FAMILY MEDICINE

## 2021-03-18 PROCEDURE — 85610 PROTHROMBIN TIME: CPT | Performed by: EMERGENCY MEDICINE

## 2021-03-18 PROCEDURE — 85379 FIBRIN DEGRADATION QUANT: CPT | Performed by: EMERGENCY MEDICINE

## 2021-03-18 PROCEDURE — 93005 ELECTROCARDIOGRAM TRACING: CPT

## 2021-03-18 PROCEDURE — 71045 X-RAY EXAM CHEST 1 VIEW: CPT

## 2021-03-18 PROCEDURE — 99285 EMERGENCY DEPT VISIT HI MDM: CPT | Performed by: EMERGENCY MEDICINE

## 2021-03-18 RX ORDER — NITROGLYCERIN 0.4 MG/1
0.4 TABLET SUBLINGUAL ONCE
Status: COMPLETED | OUTPATIENT
Start: 2021-03-18 | End: 2021-03-18

## 2021-03-18 RX ORDER — ASPIRIN 325 MG
325 TABLET ORAL ONCE
Status: COMPLETED | OUTPATIENT
Start: 2021-03-18 | End: 2021-03-18

## 2021-03-18 RX ORDER — PRAVASTATIN SODIUM 40 MG
80 TABLET ORAL
Status: DISCONTINUED | OUTPATIENT
Start: 2021-03-18 | End: 2021-03-19 | Stop reason: HOSPADM

## 2021-03-18 RX ORDER — LISINOPRIL 10 MG/1
10 TABLET ORAL DAILY
Status: DISCONTINUED | OUTPATIENT
Start: 2021-03-18 | End: 2021-03-19 | Stop reason: HOSPADM

## 2021-03-18 RX ORDER — ANASTROZOLE 1 MG/1
1 TABLET ORAL DAILY
Status: DISCONTINUED | OUTPATIENT
Start: 2021-03-18 | End: 2021-03-19 | Stop reason: HOSPADM

## 2021-03-18 RX ADMIN — ANASTROZOLE 1 MG: 1 TABLET, COATED ORAL at 20:24

## 2021-03-18 RX ADMIN — APIXABAN 5 MG: 5 TABLET, FILM COATED ORAL at 20:24

## 2021-03-18 RX ADMIN — NITROGLYCERIN 0.4 MG: 0.4 TABLET SUBLINGUAL at 13:17

## 2021-03-18 RX ADMIN — PRAVASTATIN SODIUM 80 MG: 40 TABLET ORAL at 20:23

## 2021-03-18 RX ADMIN — ASPIRIN 325 MG: 325 TABLET, FILM COATED ORAL at 13:17

## 2021-03-18 RX ADMIN — LISINOPRIL 10 MG: 10 TABLET ORAL at 20:24

## 2021-03-18 RX ADMIN — IOHEXOL 100 ML: 350 INJECTION, SOLUTION INTRAVENOUS at 11:46

## 2021-03-18 NOTE — ASSESSMENT & PLAN NOTE
Multiple left axillary nodes, unchanged in size, but increased in number  This may be due to recent vaccination  In the absence of a history of recent vaccination, follow-up is needed to exclude malignancy  Patient has history of breast cancer     She will need follow-up as outpatient

## 2021-03-18 NOTE — ED PROVIDER NOTES
History  Chief Complaint   Patient presents with    Chest Pain     described as heaviness  onset this AM     HPI      This is a very pleasant, nontoxic, polite, respectful, 57-year-old female presents the emergency department with substernal chest heaviness which started at approximately 5:30 a m  In the morning when she woke up today to go to work  Patient noted that around 7:00 a m  It started to radiate to the left clavicle area and left shoulder  Patient denies any nausea, shortness of breath, pain in the back, syncope, exercise intolerance  Patient does report that when she takes a big deep breath it is worse and described as pleuritic in nature  Patient denies any diaphoresis but does report occasionally she has some sweating episodes  Patient attributes this to her going through menopause  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 reported this to a co-worker and advised to go to the local school nurse took her vital signs are all within normal limits and advised to come here for further evaluation  Most recently patient had an colonoscopy  on 03/15/21 in which she had to go off of her Eliquis as per recommendations from the GI specialist: Dr Aisha Vieyra  Patient was on Eliquis for a history of bilateral pulmonary embolism  Other relevant history is the patient had malignant neoplasm of the upper outer quadrant the right breast which was estrogen receptor positive  Patient has a history of bilateral mastectomy w/ subseq reconstructive surgery  Prior to Admission Medications   Prescriptions Last Dose Informant Patient Reported? Taking?    Calcium Carbonate-Vit D-Min (CALCIUM 1200 PO)   Yes No   Sig: Take by mouth daily   Cholecalciferol (Vitamin D3) 50 MCG (2000 UT) TABS   Yes No   Sig: Take 2,000 Units by mouth daily   Multiple Vitamin (multivitamin) tablet  Self Yes No   Sig: Take 1 tablet by mouth daily   anastrozole (Arimidex) 1 mg tablet   Yes No   apixaban (ELIQUIS) 5 mg  Self No No   Sig: Take 2 tablets (10 mg total) by mouth 2 (two) times a day for 7 days, THEN 1 tablet (5 mg total) 2 (two) times a day for 23 days  levocetirizine (Xyzal) 5 MG tablet  Self Yes No   Sig: Take 5 mg by mouth every evening   lisinopril (ZESTRIL) 10 mg tablet  Self Yes No   simvastatin (ZOCOR) 40 mg tablet  Self Yes No      Facility-Administered Medications: None       Past Medical History:   Diagnosis Date    Allergic rhinitis     [facility]     [facility] ([facility] )     IgA nephropathy        Past Surgical History:   Procedure Laterality Date    BREAST BIOPSY Left     l breast    ENDOMETRIAL BIOPSY      IR BIOPSY KIDNEY MASS  12/19/2018    MASTECTOMY Bilateral 07/25/2019       Family History   Problem Relation Age of Onset    Breast cancer Mother     Hernia Father      I have reviewed and agree with the history as documented  E-Cigarette/Vaping    E-Cigarette Use Never User      E-Cigarette/Vaping Substances    Nicotine No     THC No     CBD No     Flavoring No     Other No     Unknown No      Social History     Tobacco Use    Smoking status: Never Smoker    Smokeless tobacco: Never Used   Substance Use Topics    Alcohol use: Yes     Comment: socially    Drug use: No       Review of Systems   Constitutional: Negative  HENT: Negative  Eyes: Negative  Respiratory: Positive for chest tightness and shortness of breath  Cardiovascular: Positive for chest pain  Gastrointestinal: Negative  Endocrine: Negative  Genitourinary: Negative  Musculoskeletal: Negative  Skin: Negative  Allergic/Immunologic: Negative  Neurological: Negative  Hematological: Negative  Psychiatric/Behavioral: Negative  Physical Exam  Physical Exam  Vitals signs and nursing note reviewed  Constitutional:       Appearance: She is well-developed and normal weight  HENT:      Head: Normocephalic and atraumatic  Jaw: There is normal jaw occlusion        Comments: Patient maintaining airway maintaining secretions  No stridor  No brawniness under the tongue  Uvula midline without edema  Neck:      Musculoskeletal: Normal range of motion and neck supple  Cardiovascular:      Rate and Rhythm: Normal rate and regular rhythm  Heart sounds: Normal heart sounds  Pulmonary:      Effort: Pulmonary effort is normal       Breath sounds: Normal breath sounds  Abdominal:      General: Bowel sounds are normal       Palpations: Abdomen is soft  Musculoskeletal: Normal range of motion  Skin:     General: Skin is warm  Capillary Refill: Capillary refill takes less than 2 seconds  Neurological:      General: No focal deficit present  Mental Status: She is alert and oriented to person, place, and time  Vital Signs  ED Triage Vitals   Temperature Pulse Respirations Blood Pressure SpO2   03/18/21 1015 03/18/21 1015 03/18/21 1015 03/18/21 1130 03/18/21 1015   98 6 °F (37 °C) 82 16 117/79 99 %      Temp Source Heart Rate Source Patient Position - Orthostatic VS BP Location FiO2 (%)   03/18/21 1015 03/18/21 1015 -- -- --   Temporal Monitor         Pain Score       --                  Vitals:    03/18/21 1015 03/18/21 1130   BP:  117/79   Pulse: 82 76         Visual Acuity      ED Medications  Medications   iohexol (OMNIPAQUE) 350 MG/ML injection (MULTI-DOSE) 100 mL (100 mL Intravenous Given 3/18/21 1146)   nitroglycerin (NITROSTAT) SL tablet 0 4 mg (0 4 mg Sublingual Given 3/18/21 1317)   aspirin tablet 325 mg (325 mg Oral Given 3/18/21 1317)       Diagnostic Studies  Results Reviewed     Procedure Component Value Units Date/Time    TSH [[id number]]  (Normal) Collected: 03/18/21 1048    Lab Status: Final result Specimen: Blood from Arm, Left Updated: 03/18/21 1126     TSH 3RD GENERATON 2 340 uIU/mL     Narrative:      Patients undergoing fluorescein dye angiography may retain small amounts of fluorescein in the body for 48-72 hours post procedure   Samples containing fluorescein can produce falsely depressed TSH values  If the patient had this procedure,a specimen should be resubmitted post fluorescein clearance        Troponin I [[id number]]  (Normal) Collected: 03/18/21 1048    Lab Status: Final result Specimen: Blood from Arm, Left Updated: 03/18/21 1113     Troponin I <0 03 ng/mL     Comprehensive metabolic panel [[id number]] Collected: 03/18/21 1048    Lab Status: Final result Specimen: Blood from Arm, Left Updated: 03/18/21 1110     Sodium 142 mmol/L      Potassium 4 5 mmol/L      Chloride 105 mmol/L      CO2 28 mmol/L      ANION GAP 9 mmol/L      BUN 17 mg/dL      Creatinine 1 17 mg/dL      Glucose 88 mg/dL      Calcium 8 9 mg/dL      AST 17 U/L      ALT 14 U/L      Alkaline Phosphatase 51 U/L      Total Protein 6 6 g/dL      Albumin 4 0 g/dL      Total Bilirubin 0 50 mg/dL      eGFR 54 ml/min/1 73sq m     Narrative:      [facility] guidelines for Chronic Kidney Disease (CKD):     Stage 1 with normal or high GFR (GFR > 90 mL/min/1 73 square meters)    Stage 2 Mild CKD (GFR = 60-89 mL/min/1 73 square meters)    Stage 3A Moderate CKD (GFR = 45-59 mL/min/1 73 square meters)    Stage 3B Moderate CKD (GFR = 30-44 mL/min/1 73 square meters)    Stage 4 Severe CKD (GFR = 15-29 mL/min/1 73 square meters)    Stage 5 End Stage CKD (GFR <15 mL/min/1 73 square meters)  Note: GFR calculation is accurate only with a steady state creatinine    D-Dimer [[id number]]  (Normal) Collected: 03/18/21 1048    Lab Status: Final result Specimen: Blood from Arm, Left Updated: 03/18/21 1108     D-Dimer, Quant 0 33 ug/ml FEU     Protime-INR [[id number]]  (Abnormal) Collected: 03/18/21 1048    Lab Status: Final result Specimen: Blood from Arm, Left Updated: 03/18/21 1107     Protime 15 5 seconds      INR 1 25    APTT [[id number]]  (Normal) Collected: 03/18/21 1048    Lab Status: Final result Specimen: Blood from Arm, Left Updated: 03/18/21 1107     PTT 33 seconds CBC and differential [[id number]]  (Abnormal) Collected: 03/18/21 1048    Lab Status: Final result Specimen: Blood from Arm, Left Updated: 03/18/21 1057     WBC 8 10 Thousand/uL      RBC 4 59 Million/uL      Hemoglobin 11 8 g/dL      Hematocrit 36 6 %      MCV 80 fL      MCH 25 8 pg      MCHC 32 3 g/dL      RDW 15 5 %      MPV 7 1 fL      Platelets 320 Thousands/uL      Neutrophils Relative 71 %      Lymphocytes Relative 19 %      Monocytes Relative 7 %      Eosinophils Relative 2 %      Basophils Relative 1 %      Neutrophils Absolute 5 80 Thousands/µL      Lymphocytes Absolute 1 50 Thousands/µL      Monocytes Absolute 0 60 Thousand/µL      Eosinophils Absolute 0 20 Thousand/µL      Basophils Absolute 0 10 Thousands/µL                  CTA ED chest PE study   Final Result by Nidia Bolivar MD (03/18 1235)      No acute pulmonary embolus  Lungs clear  Multiple left axillary nodes, unchanged in size, but increased in number  This may be due to recent vaccination  In the absence of a history of recent vaccination, follow-up is needed to exclude malignancy  I personally discussed this study with OMKAR OLIVA III on 3/18/2021 at 12:34 PM                       Workstation performed: [id number]         XR chest 1 view portable    (Results Pending)              Procedures  ECG 12 Lead Documentation Only    Date/Time: 3/18/2021 10:37 AM  Performed by: Ayala Munoz III, DO  Authorized by: Ayala Munoz III, DO     Indications / Diagnosis:  Chest heaviness  ECG reviewed by me, the ED Provider: yes    Patient location:  ED  Comments:      I personally reviewed this EKG is performed on the patient March 18, 2021  EKG was completed at 10:32 a m  And interpreted by me at 10:37 a m   Normal sinus rhythm with no acute ST abnormalities  ED Course  ED Course as of Mar 18 1346   Thu Mar 18, 2021   1108 Noted patient D-dimer is negative  , moderate score for Wells and PERC score, prior history bilateral pulmonary embolism w/ breat cancer, off Eliquis the last few days for a recent colonoscopy, with high risk population:  D-dimer is less sensitive will proceed with a CT of the chest rule out pulmonary embolism  1142 Updated patient about labs the resulted, patient going to CAT scan  1234 Received call from radiology, see report: [phone]: Dr James Yen  [facility] report noted that she has significant amount of lymphadenopathy noted on the left side  The radiologist noted that in light of a recent COVID vaccine or immunization could explain this finding but if she did not receive the vaccination he could be related to a prior history of breast cancer  Patient received her 1st dose of the COVID-19 in the left arm approximately 1 week ago  Extensive conversation about the chest pain which is still persistent but mild will place the patient in hospital for observation  [address] Final contacted                  HEART Risk Score      Most Recent Value   Heart Score Risk Calculator   History  0 Filed at: 03/18/2021 1124   ECG  1 Filed at: 03/18/2021 1124   Age  1 Filed at: 03/18/2021 1124   Risk Factors  0 Filed at: 03/18/2021 1124   Troponin  0 Filed at: 03/18/2021 1124   HEART Score  2 Filed at: 03/18/2021 1124              PERC Rule for PE      Most Recent Value   PERC Rule for PE   Age >=50  1 Filed at: 03/18/2021 1107   HR >=100  0 Filed at: 03/18/2021 1107   O2 Sat on room air < 95%  0 Filed at: 03/18/2021 1107   History of PE or DVT  1 Filed at: 03/18/2021 1107   Recent trauma or surgery  0 Filed at: 03/18/2021 1107   Hemoptysis  0 Filed at: 03/18/2021 1107   Exogenous estrogen  0 Filed at: 03/18/2021 1107   Unilateral leg swelling  0 Filed at: 03/18/2021 1107   PERC Rule for PE Results  2 Filed at: 03/18/2021 1107                  Carrie Mirza' Criteria for PE      Most Recent Value   John' Criteria for PE   Clinical signs and symptoms of DVT  0 Filed at: 03/18/2021 56   PE is primary diagnosis or equally likely  3 Filed at: 03/18/2021 1341   HR >100  0 Filed at: 03/18/2021 1341   Immobilization at least 3 days or Surgery in the previous 4 weeks  1 5 Filed at: 03/18/2021 1341   Previous, objectively diagnosed PE or DVT  1 5 Filed at: 03/18/2021 1341   Hemoptysis  0 Filed at: 03/18/2021 1341   Malignancy with treatment within 6 months or palliative  1 Filed at: 03/18/2021 1341   Wells' Criteria Total  7 Filed at: 03/18/2021 1341                MDM  Number of Diagnoses or Management Options  Chest pain:   Diagnosis management comments: This is a very pleasant, nontoxic, 49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 with chest heaviness, see HPI for specifics, prior PE, moderate to high risk Wells criteria PERC score, heart score of 2, EKG x1 and troponins within normal limits  CTA of the chest ruled out a pulmonary embolism  Patient received her COVID vaccination last week which may explain the lymphadenopathy found on the CT imaging  Please see ED course specifics in conversation with Radiology, patient follows up with Hematology[facility]Oncology from [facility]  No prior stress test or cardiac testing in the past   There is a mild degree of anxiety associated with presentation, will admit for observation  Portions of the record may have been created with voice recognition software  Occasional wrong word or "sound a like" substitutions may have occurred due to the inherent limitations of voice recognition software  Read the chart carefully and recognize, using context, where substitutions have occurred             Amount and/or Complexity of Data Reviewed  Clinical lab tests: ordered and reviewed  Tests in the radiology section of CPT®: ordered and reviewed  Tests in the medicine section of CPT®: ordered and reviewed        Disposition  Final diagnoses:   Chest pain     Time reflects when diagnosis was documented in both MDM as applicable and the Disposition within this note     Time User Action Codes Description Comment    3/18/2021  1:20 PM Bailey Torres Add [R07 9] Chest pain       ED Disposition     ED Disposition Condition Date/Time Comment    Admit Stable Thu Mar 18, 2021  1:20 PM Case was discussed with Ch;dianne Ann PA-C and the patient's admission status was agreed to be Admission Status: observation status to the service of Dr Sigrid Ventura   Follow-up Information    None         Patient's Medications   Discharge Prescriptions    No medications on file     No discharge procedures on file      PDMP Review     None          ED Provider  Electronically Signed by           Artie Ingram III, DO  03/18/21 1700 Rani Robert III, DO  03/18/21 4972

## 2021-03-18 NOTE — H&P
[address] [facility] 1970, 44 y o  female MRN: [id number]  Unit/Bed#: -01 Encounter: [id number]  Primary Care Provider: CASSIDY Valencia   Date and time admitted to hospital: 3/18/2021 10:11 AM    * Chest pain  Assessment & Plan  Gradual onset of chest tightness since 7:00 a m , worsened with deep breathing  CTA:  Negative for PE  EKG normal sinus rhythm  First troponin negative  Will trend troponin  Serial EKG  Will place on telemetry  Depending on results my consult Cardiology    DANIEL score 1      Abnormal CT scan  Assessment & Plan  Multiple left axillary nodes, unchanged in size, but increased in number  This may be due to recent vaccination  In the absence of a history of recent vaccination, follow-up is needed to exclude malignancy  Patient has history of breast cancer     She will need follow-up as outpatient    Stage 3a chronic kidney disease  Assessment & Plan  Lab Results   Component Value Date    EGFR 54 03/18/2021    EGFR 53 02/08/2021    EGFR 58 12/22/2020    CREATININE 1 17 03/18/2021    CREATININE 1 19 02/08/2021    CREATININE 1 10 12/22/2020   Creatinine at baseline    Mixed hyperlipidemia  Assessment & Plan  Continue statin    Essential hypertension  Assessment & Plan  Well controlled  Continue lisinopril    Breast cancer   Assessment & Plan  Continue anastrozole    History of pulmonary embolism  Assessment & Plan  In 2020  On Eliquis      VTE Prophylaxis: Apixaban (Eliquis)  / sequential compression device   Code Status:  Full code  POLST: POLST form is not discussed and not completed at this time  Discussion with family:  With patient    Anticipated Length of Stay:  Patient will be admitted on an Observation basis with an anticipated length of stay of less than 2 midnights  Justification for Hospital Stay:  ACS rule out    Total Time for Visit, including Counseling / Coordination of Care: 45 minutes    Greater than 50% of this total time spent on direct patient counseling and coordination of care  Chief Complaint:   Chest pain    History of Present Illness:    Vivian Lawton is a 44 y o  female with past medical history of breast cancer, PE on Eliquis, hypertension, overweight who presents with chest pain  She describes gradual onset of chest tightness since this morning, substernal, 8/10, nonradiating , worsening with deep breath, no alleviating factors  It is not associated with shortness of breath or nausea vomiting  Never had like that before  No chest pain much improved    Review of Systems:    Review of Systems   Constitutional: Negative for chills, fever and unexpected weight change  HENT: Negative for hearing loss, nosebleeds and sore throat  Eyes: Negative for pain, redness and visual disturbance  Respiratory: Positive for chest tightness  Negative for cough, shortness of breath and wheezing  Cardiovascular: Positive for chest pain  Negative for palpitations and leg swelling  Gastrointestinal: Negative for abdominal pain, nausea and vomiting  Endocrine: Negative for polydipsia and polyuria  Genitourinary: Negative for dysuria and hematuria  Musculoskeletal: Negative for arthralgias, joint swelling and myalgias  Skin: Negative for rash and wound  Neurological: Negative for dizziness, numbness and headaches  Psychiatric/Behavioral: Negative for decreased concentration and suicidal ideas  The patient is not nervous/anxious  Past Medical and Surgical History:     Past Medical History:   Diagnosis Date    Allergic rhinitis     [facility]     [facility]     IgA nephropathy        Past Surgical History:   Procedure Laterality Date    BREAST BIOPSY Left     l breast    ENDOMETRIAL BIOPSY      IR BIOPSY KIDNEY MASS  12/19/2018    MASTECTOMY Bilateral 07/25/2019       Meds/Allergies:    Prior to Admission medications    Medication Sig Start Date End Date Taking?  Authorizing Provider anastrozole (Arimidex) 1 mg tablet  8/27/20  Yes Historical Provider, MD   apixaban (ELIQUIS) 5 mg Take 2 tablets (10 mg total) by mouth 2 (two) times a day for 7 days, THEN 1 tablet (5 mg total) 2 (two) times a day for 23 days  7/23/20 3/18/21 Yes Noni Soulier, MD   Calcium Carbonate-Vit D-Min (CALCIUM 1200 PO) Take by mouth daily   Yes Historical Provider, MD   Cholecalciferol (Vitamin D3) 50 MCG (2000 UT) TABS Take 2,000 Units by mouth daily   Yes Historical Provider, MD   levocetirizine (Xyzal) 5 MG tablet Take 5 mg by mouth every evening   Yes Historical Provider, MD   lisinopril (ZESTRIL) 10 mg tablet  7/9/19  Yes Historical Provider, MD   Multiple Vitamin (multivitamin) tablet Take 1 tablet by mouth daily   Yes Historical Provider, MD   simvastatin (ZOCOR) 40 mg tablet  7/9/19  Yes Historical Provider, MD     I have reviewed home medications with patient personally  Allergies:    Allergies   Allergen Reactions    Bactrim [Sulfamethoxazole-Trimethoprim]     Sulfasalazine Rash       Social History:     Marital Status:    Occupation:  Secretory  Patient Pre-hospital Living Situation:  Living with Family  Patient Pre-hospital Level of Mobility:  None  Patient Pre-hospital Diet Restrictions:  Normal  Substance Use History:   Social History     Substance and Sexual Activity   Alcohol Use Yes    Comment: socially     Social History     Tobacco Use   Smoking Status Never Smoker   Smokeless Tobacco Never Used     Social History     Substance and Sexual Activity   Drug Use No       Family History:    non-contributory    Physical Exam:     Vitals:   Blood Pressure: 125/70 (03/18/21 1432)  Pulse: 77 (03/18/21 1432)  Temperature: 98 9 °F (37 2 °C) (03/18/21 1432)  Temp Source: Temporal (03/18/21 1432)  Respirations: 18 (03/18/21 1432)  Height: 5' 3" (160 cm) (03/18/21 1432)  Weight - Scale: 75 4 kg (166 lb 1 9 oz) (03/18/21 1432)  SpO2: 98 % (03/18/21 1432)    Physical Exam  Vitals signs and nursing note reviewed  Constitutional:       General: She is not in acute distress  HENT:      Head: Normocephalic  Nose: Nose normal       Mouth/Throat:      Pharynx: Oropharynx is clear  Eyes:      Conjunctiva/sclera: Conjunctivae normal    Cardiovascular:      Rate and Rhythm: Normal rate and regular rhythm  Heart sounds: No murmur  Pulmonary:      Effort: Pulmonary effort is normal  No respiratory distress  Breath sounds: Normal breath sounds  No wheezing  Abdominal:      General: There is no distension  Tenderness: There is no abdominal tenderness  There is no guarding  Musculoskeletal: Normal range of motion  General: No swelling  Right lower leg: No edema  Skin:     General: Skin is warm  Capillary Refill: Capillary refill takes less than 2 seconds  Neurological:      General: No focal deficit present  Mental Status: She is alert and oriented to person, place, and time  Cranial Nerves: No cranial nerve deficit  Psychiatric:         Mood and Affect: Mood normal              Additional Data:     Lab Results: I have personally reviewed pertinent reports  Results from last 7 days   Lab Units 03/18/21  1048   WBC Thousand/uL 8 10   HEMOGLOBIN g/dL 11 8*   HEMATOCRIT % 36 6*   PLATELETS Thousands/uL 218   NEUTROS PCT % 71   LYMPHS PCT % 19*   MONOS PCT % 7   EOS PCT % 2     Results from last 7 days   Lab Units 03/18/21  1048   SODIUM mmol/L 142   POTASSIUM mmol/L 4 5   CHLORIDE mmol/L 105   CO2 mmol/L 28   BUN mg/dL 17   CREATININE mg/dL 1 17   ANION GAP mmol/L 9   CALCIUM mg/dL 8 9   ALBUMIN g/dL 4 0   TOTAL BILIRUBIN mg/dL 0 50   ALK PHOS U/L 51   ALT U/L 14   AST U/L 17   GLUCOSE RANDOM mg/dL 88     Results from last 7 days   Lab Units 03/18/21  1048   INR  1 25*                   Imaging: I have personally reviewed pertinent reports  CTA ED chest PE study   Final Result by Burgess Yue MD (03/18 4664)      No acute pulmonary embolus        Lungs clear       Multiple left axillary nodes, unchanged in size, but increased in number  This may be due to recent vaccination  In the absence of a history of recent vaccination, follow-up is needed to exclude malignancy  I personally discussed this study with OMKAR OLIVA III on 3/18/2021 at 12:34 PM                       Workstation performed: [id number]         XR chest 1 view portable    (Results Pending)       EKG, Pathology, and Other Studies Reviewed on Admission:   · EKG:  Normal sinus rhythm nonischemic    Allscripts / Epic Records Reviewed: No     ** Please Note: This note has been constructed using a voice recognition system   **

## 2021-03-18 NOTE — PLAN OF CARE
Problem: PAIN - ADULT  Goal: Verbalizes/displays adequate comfort level or baseline comfort level  Description: Interventions:  - Encourage patient to monitor pain and request assistance  - Assess pain using appropriate pain scale  - Administer analgesics based on type and severity of pain and evaluate response  - Implement non-pharmacological measures as appropriate and evaluate response  - Consider cultural and social influences on pain and pain management  - Notify physician/advanced practitioner if interventions unsuccessful or patient reports new pain  Outcome: Progressing     Problem: INFECTION - ADULT  Goal: Absence or prevention of progression during hospitalization  Description: INTERVENTIONS:  - Assess and monitor for signs and symptoms of infection  - Monitor lab/diagnostic results  - Monitor all insertion sites, i e  indwelling lines, tubes, and drains  - Monitor endotracheal if appropriate and nasal secretions for changes in amount and color  - [place] appropriate cooling/warming therapies per order  - Administer medications as ordered  - Instruct and encourage patient and family to use good hand hygiene technique  - Identify and instruct in appropriate isolation precautions for identified infection/condition  Outcome: Progressing  Goal: Absence of fever/infection during neutropenic period  Description: INTERVENTIONS:  - Monitor WBC    Outcome: Progressing     Problem: SAFETY ADULT  Goal: Patient will remain free of falls  Description: INTERVENTIONS:  - Assess patient frequently for physical needs  -  Identify cognitive and physical deficits and behaviors that affect risk of falls    -  [place] fall precautions as indicated by assessment   - Educate patient/family on patient safety including physical limitations  - Instruct patient to call for assistance with activity based on assessment  - Modify environment to reduce risk of injury  - Consider OT/PT consult to assist with strengthening/mobility  Outcome: Progressing  Goal: Maintain or return to baseline ADL function  Description: INTERVENTIONS:  -  Assess patient's ability to carry out ADLs; assess patient's baseline for ADL function and identify physical deficits which impact ability to perform ADLs (bathing, care of mouth/teeth, toileting, grooming, dressing, etc )  - Assess/evaluate cause of self-care deficits   - Assess range of motion  - Assess patient's mobility; develop plan if impaired  - Assess patient's need for assistive devices and provide as appropriate  - Encourage maximum independence but intervene and supervise when necessary  - Involve family in performance of ADLs  - Assess for home care needs following discharge   - Consider OT consult to assist with ADL evaluation and planning for discharge  - Provide patient education as appropriate  Outcome: Progressing  Goal: Maintain or return mobility status to optimal level  Description: INTERVENTIONS:  - Assess patient's baseline mobility status (ambulation, transfers, stairs, etc )    - Identify cognitive and physical deficits and behaviors that affect mobility  - Identify mobility aids required to assist with transfers and/or ambulation (gait belt, sit-to-stand, lift, walker, cane, etc )  - [place] fall precautions as indicated by assessment  - Record patient progress and toleration of activity level on Mobility SBAR; progress patient to next Phase/Stage  - Instruct patient to call for assistance with activity based on assessment  - Consider rehabilitation consult to assist with strengthening/weightbearing, etc   Outcome: Progressing     Problem: DISCHARGE PLANNING  Goal: Discharge to home or other facility with appropriate resources  Description: INTERVENTIONS:  - Identify barriers to discharge w/patient and caregiver  - Arrange for needed discharge resources and transportation as appropriate  - Identify discharge learning needs (meds, wound care, etc )  - Arrange for interpretive services to assist at discharge as needed  - Refer to Case Management Department for coordinating discharge planning if the patient needs post-hospital services based on physician/advanced practitioner order or complex needs related to functional status, cognitive ability, or social support system  Outcome: Progressing     Problem: Knowledge Deficit  Goal: Patient/family/caregiver demonstrates understanding of disease process, treatment plan, medications, and discharge instructions  Description: Complete learning assessment and assess knowledge base    Interventions:  - Provide teaching at level of understanding  - Provide teaching via preferred learning methods  Outcome: Progressing     Problem: CARDIOVASCULAR - ADULT  Goal: Maintains optimal cardiac output and hemodynamic stability  Description: INTERVENTIONS:  - Monitor I/O, vital signs and rhythm  - Monitor for S/S and trends of decreased cardiac output  - Administer and titrate ordered vasoactive medications to optimize hemodynamic stability  - Assess quality of pulses, skin color and temperature  - Assess for signs of decreased coronary artery perfusion  - Instruct patient to report change in severity of symptoms  Outcome: Progressing  Goal: Absence of cardiac dysrhythmias or at baseline rhythm  Description: INTERVENTIONS:  - Continuous cardiac monitoring, vital signs, obtain 12 lead EKG if ordered  - Administer antiarrhythmic and heart rate control medications as ordered  - Monitor electrolytes and administer replacement therapy as ordered  Outcome: Progressing

## 2021-03-18 NOTE — ASSESSMENT & PLAN NOTE
Gradual onset of chest tightness since 7:00 a m , worsened with deep breathing  CTA:  Negative for PE  EKG normal sinus rhythm  First troponin negative  Will trend troponin  Serial EKG  Will place on telemetry  Depending on results my consult Cardiology    DANIEL score 1

## 2021-03-19 VITALS
HEIGHT: 63 IN | OXYGEN SATURATION: 97 % | RESPIRATION RATE: 18 BRPM | BODY MASS INDEX: 29.92 KG/M2 | TEMPERATURE: 97.9 F | DIASTOLIC BLOOD PRESSURE: 61 MMHG | SYSTOLIC BLOOD PRESSURE: 107 MMHG | HEART RATE: 78 BPM | WEIGHT: 168.87 LBS

## 2021-03-19 PROCEDURE — 99217 PR OBSERVATION CARE DISCHARGE MANAGEMENT: CPT | Performed by: FAMILY MEDICINE

## 2021-03-19 PROCEDURE — 97162 PT EVAL MOD COMPLEX 30 MIN: CPT

## 2021-03-19 RX ADMIN — APIXABAN 5 MG: 5 TABLET, FILM COATED ORAL at 09:01

## 2021-03-19 NOTE — PHYSICAL THERAPY NOTE
Physical Therapy Evaluation     Patient's Name: Jaswant Collins    Admitting Diagnosis  Chest discomfort [R07 89]  Chest pain [R07 9]    Problem List  Patient Active Problem List   Diagnosis    Right leg pain    History of pulmonary embolism    Breast cancer     Essential hypertension    Mixed hyperlipidemia    Screen for colon cancer    IgA nephropathy    Stage 3a chronic kidney disease    Chest pain    Abnormal CT scan       Past Medical History  Past Medical History:   Diagnosis Date    Allergic rhinitis     [facility] [address] [facility]     Cancer [facility] [address] [facility]     IgA nephropathy        Past Surgical History  Past Surgical History:   Procedure Laterality Date    BREAST BIOPSY Left     l breast    ENDOMETRIAL BIOPSY      IR BIOPSY KIDNEY MASS  12/19/2018    MASTECTOMY Bilateral 07/25/2019 03/19/21 0739   PT Last Visit   PT Visit Date 03/19/21   Note Type   Note type Evaluation   Pain Assessment   Pain Assessment Tool 0-10   Pain Score 1   Pain Location/Orientation Orientation: Bilateral;Location: Chest   Pain Onset/Description Onset: Ongoing;Frequency: Intermittent; Other (Comment)  ("pressure")   Effect of Pain on Daily Activities no   Patient's Stated Pain Goal No pain   Hospital Pain Intervention(s) Medication (See MAR); Repositioned   Multiple Pain Sites No   Home Living   Type of [address] Multi-level;Bed/bath upstairs  (full flight inside)   Bathroom Shower/Tub Tub/shower unit   Bathroom Toilet Standard   Bathroom Accessibility Accessible   Home Equipment   (no prior AD usage )   Prior Function   Level of [place] Independent with ADLs and functional mobility   Lives With Family   ADL Assistance Independent   IADLs Independent   Falls in the last 6 months 0   Vocational Full time employment  ()   Restrictions/Precautions   [place] Bearing Precautions Per Order No   Other Precautions Pain;Telemetry   General   Family/Caregiver Present No   Cognition   Overall Cognitive Status WFL   Arousal/Participation Alert   Orientation Level Oriented X4   Memory Within functional limits   Following Commands Follows one step commands without difficulty   Comments Pt  agreeable to PT assessment, pleasant  RUE Assessment   RUE Assessment WFL   LUE Assessment   LUE Assessment WFL   RLE Assessment   RLE Assessment WFL   LLE Assessment   LLE Assessment WFL   Coordination   Movements are Fluid and Coordinated 1   Bed Mobility   Supine to Sit 7  Independent   Sit to Supine 7  Independent   Transfers   Sit to Stand 6  Modified independent   Additional items Bedrails;HOB elevated   Stand to Sit 6  Modified independent   Additional items HOB elevated; Bedrails   Stand pivot 5  Supervision   Additional items Assist x 1   Ambulation/Elevation   Gait pattern WNL   Gait Assistance 5  Supervision  (distant)   Additional items Assist x 1   Assistive Device None   Distance 350 feet   Stair Management Assistance Not tested   Balance   Static Sitting Normal   Dynamic Sitting Normal   Static Standing Normal   Dynamic Standing Normal   Ambulatory Good   Endurance Deficit   Endurance Deficit No   Activity Tolerance   Activity Tolerance Patient tolerated treatment well   Nurse Made Aware Yes, Cammie RN   Assessment   Prognosis Excellent   Problem List Decreased mobility;Pain   Assessment Pt is 44 y o  female seen for PT evaluation s/p admit to [address] [place] [address] on 3/18/2021 w/ Chest pain  PT consulted to assess pt's functional mobility and d/c needs  Order placed for PT eval and tx, w/ up and OOB as tolerated order  Comorbidities affecting pt's physical performance at time of assessment include:chest pain, breast CA, HTN, hx of PE, CKD  PTA, pt was independent w/ all functional mobility w/ o AD usage  Personal factors affecting pt at time of IE include: unable to perform dynamic tasks in community and inability to perform IADLs   Please find objective findings from PT assessment regarding body systems outlined above with impairments and limitations including pain  The following objective measures performed on IE also reveal limitations: AM-PAC 6-Clicks: 71/85  Pt's clinical presentation is currently evolving seen in pt's presentation of ongoing telemetry monitoring, pain  Pt to benefit from continued PT tx to address deficits as defined above and maximize level of functional independent mobility and consistency  From PT/mobility standpoint, recommendation at time of d/c would be anticipate no needs pending progress in order to facilitate return to PLOF  Barriers to Discharge None   Goals   Patient Goals to go home soon   STG Expiration Date 03/22/21   Short Term Goal #1 Patient will exhibit increase dynamic ambulatory balance to Normal > 500 feet w/o AD independently to improve ability to mobilize to toilet, chair and decrease risk for additional medical complications  PT Treatment Day 0   Plan   Treatment/Interventions Gait training;Patient/family training;Spoke to nursing   PT Frequency Follow-up visit only   Recommendation   PT Discharge Recommendation Return to previous environment with no needs   PT - OK to Discharge Yes  (if medically stable, from functional perspective)   Additional Comments Upon conclusion, pt  was resting in bed w/all needs within reach & SCDs active     AM-PAC Basic Mobility Inpatient   Turning in Bed Without Bedrails 4   Lying on Back to Sitting on Edge of Flat Bed 4   Moving Bed to Chair 4   Standing Up From Chair 4   Walk in Room 4   Climb 3-5 Stairs 3   Basic Mobility Inpatient Raw Score 23   Basic Mobility Standardized Score 50 88     Dennie Ona, PT

## 2021-03-19 NOTE — INCIDENTAL FINDINGS
The following findings require follow up:  Radiographic finding   Finding:  CT scan of the chest   Enlarged lymph nodes   Follow up required:  Yes   Follow up should be done within 1 month(s)    Please notify the following clinician to assist with the follow up:    [facility]

## 2021-03-19 NOTE — DISCHARGE INSTR - AVS FIRST PAGE
Dear Hemanth Rodriguez,     It was our pleasure to care for you here at [facility]  It is our hope that we were always able to exceed the expected standards for your care during your stay  You were hospitalized due to chest pain  You were cared for on the medical floor by Maxine Pinzon MD with the [facility] who covers for your primary care physician (PCP), Ludwig Martinez, while you were hospitalized  If you have any questions or concerns related to this hospitalization, you may contact us at [phone]  For follow up as well as any medication refills, we recommend that you follow up with your primary care physician  A registered nurse will reach out to you by phone within a few days after your discharge to answer any additional questions that you may have after going home  However, at this time we provide for you here, the most important instructions / recommendations at discharge:     · Notable Medication Adjustments -   · Non  · Testing Required after Discharge -   · Non  · Important follow up information -   · Follow-up with oncology regarding enlargement notes  · Other Instructions -   · Non  · Please review this entire after visit summary as additional general instructions including medication list, appointments, activity, diet, any pertinent wound care, and other additional recommendations from your care team that may be provided for you        Sincerely,     Maxine Pinzon MD

## 2021-03-19 NOTE — NURSING NOTE
Pt discharged home, denies pain, denies shortness of breath  IV removed without complications and tip intact  Discharge instructions read and explained to pt, all questions answered  All belongings with pt  Pt escorted to front entrance

## 2021-03-19 NOTE — PLAN OF CARE
Problem: PAIN - ADULT  Goal: Verbalizes/displays adequate comfort level or baseline comfort level  Description: Interventions:  - Encourage patient to monitor pain and request assistance  - Assess pain using appropriate pain scale  - Administer analgesics based on type and severity of pain and evaluate response  - Implement non-pharmacological measures as appropriate and evaluate response  - Consider cultural and social influences on pain and pain management  - Notify physician/advanced practitioner if interventions unsuccessful or patient reports new pain  Outcome: Progressing     Problem: INFECTION - ADULT  Goal: Absence or prevention of progression during hospitalization  Description: INTERVENTIONS:  - Assess and monitor for signs and symptoms of infection  - Monitor lab/diagnostic results  - Monitor all insertion sites, i e  indwelling lines, tubes, and drains  - Monitor endotracheal if appropriate and nasal secretions for changes in amount and color  - [place] appropriate cooling/warming therapies per order  - Administer medications as ordered  - Instruct and encourage patient and family to use good hand hygiene technique  - Identify and instruct in appropriate isolation precautions for identified infection/condition  Outcome: Progressing  Goal: Absence of fever/infection during neutropenic period  Description: INTERVENTIONS:  - Monitor WBC    Outcome: Progressing     Problem: SAFETY ADULT  Goal: Patient will remain free of falls  Description: INTERVENTIONS:  - Assess patient frequently for physical needs  -  Identify cognitive and physical deficits and behaviors that affect risk of falls    -  [place] fall precautions as indicated by assessment   - Educate patient/family on patient safety including physical limitations  - Instruct patient to call for assistance with activity based on assessment  - Modify environment to reduce risk of injury  - Consider OT/PT consult to assist with strengthening/mobility  Outcome: Progressing  Goal: Maintain or return to baseline ADL function  Description: INTERVENTIONS:  -  Assess patient's ability to carry out ADLs; assess patient's baseline for ADL function and identify physical deficits which impact ability to perform ADLs (bathing, care of mouth/teeth, toileting, grooming, dressing, etc )  - Assess/evaluate cause of self-care deficits   - Assess range of motion  - Assess patient's mobility; develop plan if impaired  - Assess patient's need for assistive devices and provide as appropriate  - Encourage maximum independence but intervene and supervise when necessary  - Involve family in performance of ADLs  - Assess for home care needs following discharge   - Consider OT consult to assist with ADL evaluation and planning for discharge  - Provide patient education as appropriate  Outcome: Progressing  Goal: Maintain or return mobility status to optimal level  Description: INTERVENTIONS:  - Assess patient's baseline mobility status (ambulation, transfers, stairs, etc )    - Identify cognitive and physical deficits and behaviors that affect mobility  - Identify mobility aids required to assist with transfers and/or ambulation (gait belt, sit-to-stand, lift, walker, cane, etc )  - [place] fall precautions as indicated by assessment  - Record patient progress and toleration of activity level on Mobility SBAR; progress patient to next Phase/Stage  - Instruct patient to call for assistance with activity based on assessment  - Consider rehabilitation consult to assist with strengthening/weightbearing, etc   Outcome: Progressing     Problem: DISCHARGE PLANNING  Goal: Discharge to home or other facility with appropriate resources  Description: INTERVENTIONS:  - Identify barriers to discharge w/patient and caregiver  - Arrange for needed discharge resources and transportation as appropriate  - Identify discharge learning needs (meds, wound care, etc )  - Arrange for interpretive services to assist at discharge as needed  - Refer to Case Management Department for coordinating discharge planning if the patient needs post-hospital services based on physician/advanced practitioner order or complex needs related to functional status, cognitive ability, or social support system  Outcome: Progressing     Problem: Knowledge Deficit  Goal: Patient/family/caregiver demonstrates understanding of disease process, treatment plan, medications, and discharge instructions  Description: Complete learning assessment and assess knowledge base    Interventions:  - Provide teaching at level of understanding  - Provide teaching via preferred learning methods  Outcome: Progressing     Problem: CARDIOVASCULAR - ADULT  Goal: Maintains optimal cardiac output and hemodynamic stability  Description: INTERVENTIONS:  - Monitor I/O, vital signs and rhythm  - Monitor for S/S and trends of decreased cardiac output  - Administer and titrate ordered vasoactive medications to optimize hemodynamic stability  - Assess quality of pulses, skin color and temperature  - Assess for signs of decreased coronary artery perfusion  - Instruct patient to report change in severity of symptoms  Outcome: Progressing  Goal: Absence of cardiac dysrhythmias or at baseline rhythm  Description: INTERVENTIONS:  - Continuous cardiac monitoring, vital signs, obtain 12 lead EKG if ordered  - Administer antiarrhythmic and heart rate control medications as ordered  - Monitor electrolytes and administer replacement therapy as ordered  Outcome: Progressing

## 2021-03-19 NOTE — PLAN OF CARE
Problem: PAIN - ADULT  Goal: Verbalizes/displays adequate comfort level or baseline comfort level  Description: Interventions:  - Encourage patient to monitor pain and request assistance  - Assess pain using appropriate pain scale  - Administer analgesics based on type and severity of pain and evaluate response  - Implement non-pharmacological measures as appropriate and evaluate response  - Consider cultural and social influences on pain and pain management  - Notify physician/advanced practitioner if interventions unsuccessful or patient reports new pain  Outcome: Progressing     Problem: INFECTION - ADULT  Goal: Absence or prevention of progression during hospitalization  Description: INTERVENTIONS:  - Assess and monitor for signs and symptoms of infection  - Monitor lab/diagnostic results  - Monitor all insertion sites, i e  indwelling lines, tubes, and drains  - Monitor endotracheal if appropriate and nasal secretions for changes in amount and color  - [place] appropriate cooling/warming therapies per order  - Administer medications as ordered  - Instruct and encourage patient and family to use good hand hygiene technique  - Identify and instruct in appropriate isolation precautions for identified infection/condition  Outcome: Progressing  Goal: Absence of fever/infection during neutropenic period  Description: INTERVENTIONS:  - Monitor WBC    Outcome: Progressing     Problem: SAFETY ADULT  Goal: Patient will remain free of falls  Description: INTERVENTIONS:  - Assess patient frequently for physical needs  -  Identify cognitive and physical deficits and behaviors that affect risk of falls    -  [place] fall precautions as indicated by assessment   - Educate patient/family on patient safety including physical limitations  - Instruct patient to call for assistance with activity based on assessment  - Modify environment to reduce risk of injury  - Consider OT/PT consult to assist with strengthening/mobility  Outcome: Progressing  Goal: Maintain or return to baseline ADL function  Description: INTERVENTIONS:  -  Assess patient's ability to carry out ADLs; assess patient's baseline for ADL function and identify physical deficits which impact ability to perform ADLs (bathing, care of mouth/teeth, toileting, grooming, dressing, etc )  - Assess/evaluate cause of self-care deficits   - Assess range of motion  - Assess patient's mobility; develop plan if impaired  - Assess patient's need for assistive devices and provide as appropriate  - Encourage maximum independence but intervene and supervise when necessary  - Involve family in performance of ADLs  - Assess for home care needs following discharge   - Consider OT consult to assist with ADL evaluation and planning for discharge  - Provide patient education as appropriate  Outcome: Progressing  Goal: Maintain or return mobility status to optimal level  Description: INTERVENTIONS:  - Assess patient's baseline mobility status (ambulation, transfers, stairs, etc )    - Identify cognitive and physical deficits and behaviors that affect mobility  - Identify mobility aids required to assist with transfers and/or ambulation (gait belt, sit-to-stand, lift, walker, cane, etc )  - [place] fall precautions as indicated by assessment  - Record patient progress and toleration of activity level on Mobility SBAR; progress patient to next Phase/Stage  - Instruct patient to call for assistance with activity based on assessment  - Consider rehabilitation consult to assist with strengthening/weightbearing, etc   Outcome: Progressing     Problem: DISCHARGE PLANNING  Goal: Discharge to home or other facility with appropriate resources  Description: INTERVENTIONS:  - Identify barriers to discharge w/patient and caregiver  - Arrange for needed discharge resources and transportation as appropriate  - Identify discharge learning needs (meds, wound care, etc )  - Arrange for interpretive services to assist at discharge as needed  - Refer to Case Management Department for coordinating discharge planning if the patient needs post-hospital services based on physician/advanced practitioner order or complex needs related to functional status, cognitive ability, or social support system  Outcome: Progressing     Problem: Knowledge Deficit  Goal: Patient/family/caregiver demonstrates understanding of disease process, treatment plan, medications, and discharge instructions  Description: Complete learning assessment and assess knowledge base    Interventions:  - Provide teaching at level of understanding  - Provide teaching via preferred learning methods  Outcome: Progressing     Problem: CARDIOVASCULAR - ADULT  Goal: Maintains optimal cardiac output and hemodynamic stability  Description: INTERVENTIONS:  - Monitor I/O, vital signs and rhythm  - Monitor for S/S and trends of decreased cardiac output  - Administer and titrate ordered vasoactive medications to optimize hemodynamic stability  - Assess quality of pulses, skin color and temperature  - Assess for signs of decreased coronary artery perfusion  - Instruct patient to report change in severity of symptoms  Outcome: Progressing  Goal: Absence of cardiac dysrhythmias or at baseline rhythm  Description: INTERVENTIONS:  - Continuous cardiac monitoring, vital signs, obtain 12 lead EKG if ordered  - Administer antiarrhythmic and heart rate control medications as ordered  - Monitor electrolytes and administer replacement therapy as ordered  Outcome: Progressing

## 2021-03-19 NOTE — ASSESSMENT & PLAN NOTE
Lab Results   Component Value Date    EGFR 54 03/18/2021    EGFR 53 02/08/2021    EGFR 58 12/22/2020    CREATININE 1 17 03/18/2021    CREATININE 1 19 02/08/2021    CREATININE 1 10 12/22/2020   Creatinine at baseline

## 2021-03-19 NOTE — UTILIZATION REVIEW
Initial Clinical Review    Admission: Date/Time/Statement:   Admission Orders (From admission, onward)     Ordered        03/18/21 1319  Place in Observation  Once                   Orders Placed This Encounter   Procedures    Place in Observation     Standing Status:   Standing     Number of Occurrences:   1     Order Specific Question:   Level of Care     Answer:   Med Surg [16]     ED Arrival Information     Expected Arrival Acuity Means of Arrival Escorted By Service Admission Type    - 3/18/2021 10:07 Urgent Walk-In Self Hospitalist Urgent    Arrival Complaint    chest discomfort        Chief Complaint   Patient presents with    Chest Pain     described as heaviness  onset this AM     Assessment/Plan:   49-year-old female presents the emergency department with substernal chest heaviness which started at approximately 5:30 a m  In the morning when she woke up today to go to work  Patient noted that around 7:00 a m  It started to radiate to the left clavicle area and left shoulder  Patient denies any nausea, shortness of breath, pain in the back, syncope, exercise intolerance  Patient does report that when she takes a big deep breath it is worse and described as pleuritic in nature  Patient denies any diaphoresis but does report occasionally she has some sweating episodes  Patient attributes this to her going through menopause     Chest pain  Assessment & Plan  Gradual onset of chest tightness since 7:00 a m , worsened with deep breathing  CTA:  Negative for PE  EKG normal sinus rhythm  First troponin negative  Will trend troponin  Serial EKG  Will place on telemetry  Depending on results my consult Cardiology     DANIEL score 1        Abnormal CT scan  Assessment & Plan  Multiple left axillary nodes, unchanged in size, but increased in number   This may be due to recent vaccination   In the absence of a history of recent vaccination, follow-up is needed to exclude malignancy       Patient has history of breast cancer     She will need follow-up as outpatient     Stage 3a chronic kidney disease  Assessment & Plan        Lab Results   Component Value Date     EGFR 54 03/18/2021     EGFR 53 02/08/2021     EGFR 58 12/22/2020     CREATININE 1 17 03/18/2021     CREATININE 1 19 02/08/2021     CREATININE 1 10 12/22/2020   Creatinine at baseline     Mixed hyperlipidemia  Assessment & Plan  Continue statin     Essential hypertension  Assessment & Plan  Well controlled  Continue lisinopril     Breast cancer   Assessment & Plan  Continue anastrozole     History of pulmonary embolism  Assessment & Plan  In 2020  On Eliquis      ED Triage Vitals   Temperature Pulse Respirations Blood Pressure SpO2   03/18/21 1015 03/18/21 1015 03/18/21 1015 03/18/21 1130 03/18/21 1015   98 6 °F (37 °C) 82 16 117/79 99 %      Temp Source Heart Rate Source Patient Position - Orthostatic VS BP Location FiO2 (%)   03/18/21 1015 03/18/21 1015 03/18/21 1432 03/18/21 1432 --   Temporal Monitor Lying Left arm       Pain Score       03/18/21 1400       5          Wt Readings from Last 1 Encounters:   03/19/21 76 6 kg (168 lb 14 oz)     Additional Vital Signs:   Date/Time  Temp  Pulse  Resp  BP  MAP (mmHg)  SpO2  O2 Device  Patient Position - Orthostatic VS   03/19/21 0720  97 9 °F (36 6 °C)  78  18  107/61  78  97 %  None (Room air)  Lying   03/18/21 2300  98 1 °F (36 7 °C)  81  18  130/71  --  98 %  --  Lying   03/18/21 2023  --  --  --  132/86  --  --  --  Sitting   03/18/21 1900  97 °F (36 1 °C)Abnormal   80  18  122/71  --  98 %  --  Lying   03/18/21 1500  97 6 °F (36 4 °C)  79  18  138/68  --  99 %  None (Room air)  Lying   03/18/21 1432  98 9 °F (37 2 °C)  77  18  125/70  --  98 %  None (Room air)  Lying   03/18/21 1130  --  76  13  117/79  92  99 %  --  --   03/18/21 1015  98 6 °F (37 °C)  82  16  --  --  99 %  None (Room air)  --       Pertinent Labs/Diagnostic Test Results:   Results from last 7 days   Lab Units 03/18/21  1048   WBC Thousand/uL 8  10   HEMOGLOBIN g/dL 11 8*   HEMATOCRIT % 36 6*   PLATELETS Thousands/uL 218   NEUTROS ABS Thousands/µL 5 80     Results from last 7 days   Lab Units 03/18/21  1048   SODIUM mmol/L 142   POTASSIUM mmol/L 4 5   CHLORIDE mmol/L 105   CO2 mmol/L 28   ANION GAP mmol/L 9   BUN mg/dL 17   CREATININE mg/dL 1 17   EGFR ml/min/1 73sq m 54   CALCIUM mg/dL 8 9     Results from last 7 days   Lab Units 03/18/21  1048   AST U/L 17   ALT U/L 14   ALK PHOS U/L 51   TOTAL PROTEIN g/dL 6 6   ALBUMIN g/dL 4 0   TOTAL BILIRUBIN mg/dL 0 50     Results from last 7 days   Lab Units 03/18/21  1048   GLUCOSE RANDOM mg/dL 88     Results from last 7 days   Lab Units 03/18/21  1813 03/18/21  1516 03/18/21  1048   TROPONIN I ng/mL <0 03 <0 03 <0 03     Results from last 7 days   Lab Units 03/18/21  1048   D-DIMER QUANTITATIVE ug/ml FEU 0 33     Results from last 7 days   Lab Units 03/18/21  1048   PROTIME seconds 15 5*   INR  1 25*   PTT seconds 33     Results from last 7 days   Lab Units 03/18/21  1048   TSH 3RD GENERATON uIU/mL 2 340     3/18  Cxr=  No acute cardiopulmonary disease  cta chest pe study=  No acute pulmonary embolus  Lungs clear  Multiple left axillary nodes, unchanged in size, but increased in number  This may be due to recent vaccination  In the absence of a history of recent vaccination, follow-up is needed to exclude malignancy     Ekg=  Normal sinus rhythm with no acute ST abnormalities  ED Treatment:   Medication Administration from 03/18/2021 1007 to 03/18/2021 1356       Date/Time Order Dose Route Action     03/18/2021 1146 iohexol (OMNIPAQUE) 350 MG/ML injection (MULTI-DOSE) 100 mL 100 mL Intravenous Given     03/18/2021 1317 nitroglycerin (NITROSTAT) SL tablet 0 4 mg 0 4 mg Sublingual Given     03/18/2021 1317 aspirin tablet 325 mg 325 mg Oral Given        Past Medical History:   Diagnosis Date    Allergic rhinitis     [facility]     [facility] ([facility] )     IgA nephropathy      Present on Admission:   Breast cancer    Essential hypertension   History of pulmonary embolism   Mixed hyperlipidemia    Admitting Diagnosis: Chest discomfort [R07 89]  Chest pain [R07 9]  Age/Sex: 44 y o  female  Admission Orders:  Telemetry  Pt/ot eval & tx  Scd/foot pumps    Scheduled Medications:  anastrozole, 1 mg, Oral, Daily  apixaban, 5 mg, Oral, BID  lisinopril, 10 mg, Oral, Daily  pravastatin, 80 mg, Oral, Daily With [employer] Utilization Review Department  ATTENTION: Please call with any questions or concerns to [phone] and carefully listen to the prompts so that you are directed to the right person  All voicemails are confidential   Mariela Corrigan all requests for admission clinical reviews, approved or denied determinations and any other requests to dedicated fax number below belonging to the campus where the patient is receiving treatment   List of dedicated fax numbers for the Facilities:  [address] DENIALS (Administrative/Medical Necessity) [phone]   [address] (Maternity/NICU/Pediatrics) [phone] [address] [place] [address] ([place]) [address] [place] [address] Sylvia Manriquez [address] [place] [address] [phone]

## 2021-03-19 NOTE — ASSESSMENT & PLAN NOTE
Gradual onset of chest tightness since 7:00 a m , worsened with deep breathing  CTA:  Negative for PE  EKG normal sinus rhythm,  Troponins negative  Telemetry unremarkable      Patient Had similar chest pain in the past when having anxiety    No chest pain at this point    DANIEL score 1

## 2021-03-19 NOTE — CASE MANAGEMENT
Cm met with the pt and made her aware she was here as an obs, she was made aware of cm role, ot was admitted for chest pain, pt is independent and drives, she lives with her parents in a 4 story home ( basement ,1st & 2nd & attic) br on 1st & basement, pt antonella smoking an states she drinks alcohol on occasion, no hx of HHC, DME: none RX plan Manadya Solano, pt denies any d /c neds, d/c order was written, pt drove he and she drove herself home, doctor and RN were made aware ,pt is in agreemetn with the d/c an dd/c plan home CM reviewed d/c planning process including the following: identifying help at home, patient preference for d/c planning needs, availability of treatment team to discuss questions or concerns patient and/or family may have regarding understanding medications and recognizing signs and symptoms once discharged  CM also encouraged patient to follow up with all recommended appointments after discharge  Patient advised of importance for patient and family to participate in managing patients medical well being  Patient/caregiver received discharge checklist   Content reviewed  Patient/caregiver encouraged to participate in discharge plan of care prior to discharge home

## 2021-03-19 NOTE — OCCUPATIONAL THERAPY NOTE
[facility]    Patient Name: Rajesh Proctor  FXOSJ'V Date: 3/19/2021  Problem List  Principal Problem:    Chest pain  Active Problems:    History of pulmonary embolism    Breast cancer     Essential hypertension    Mixed hyperlipidemia    Stage 3a chronic kidney disease    Abnormal CT scan       Order received for OT Evaluation  Spoke with PT Rossi Dawn and patient who indicate that the patient has been functioning at an independent level in room with no assistive device  The patient has no concerns about returning home and completing daily acitivties  Patient encouraged to ambulate in room ad cj until discharge in order to maintain mobility and independent status  Patient appears to be functioning at baseline, no further Acute OT needs identified at this time to warrant OT services  D/C OT orders       Evans Cuevas OT

## 2021-03-19 NOTE — PLAN OF CARE
Problem: PHYSICAL THERAPY ADULT  Goal: Performs mobility at highest level of function for planned discharge setting  See evaluation for individualized goals  Description: Treatment/Interventions: Gait training, Patient/family training, Spoke to nursing          See flowsheet documentation for full assessment, interventions and recommendations  Note: Prognosis: Excellent  Problem List: Decreased mobility, Pain  Assessment: Pt is 44 y o  female seen for PT evaluation s/p admit to [address] [place] [address] on 3/18/2021 w/ Chest pain  PT consulted to assess pt's functional mobility and d/c needs  Order placed for PT eval and tx, w/ up and OOB as tolerated order  Comorbidities affecting pt's physical performance at time of assessment include:chest pain, breast CA, HTN, hx of PE, CKD  PTA, pt was independent w/ all functional mobility w/ o AD usage  Personal factors affecting pt at time of IE include: unable to perform dynamic tasks in community and inability to perform IADLs  Please find objective findings from PT assessment regarding body systems outlined above with impairments and limitations including pain  The following objective measures performed on IE also reveal limitations: AM-PAC 6-Clicks: 27/81  Pt's clinical presentation is currently evolving seen in pt's presentation of ongoing telemetry monitoring, pain  Pt to benefit from continued PT tx to address deficits as defined above and maximize level of functional independent mobility and consistency  From PT/mobility standpoint, recommendation at time of d/c would be anticipate no needs pending progress in order to facilitate return to PLOF  Barriers to Discharge: None     PT Discharge Recommendation: Return to previous environment with no needs     PT - OK to Discharge: Yes(if medically stable, from functional perspective)    See flowsheet documentation for full assessment

## 2021-03-19 NOTE — ASSESSMENT & PLAN NOTE
Multiple left axillary nodes, unchanged in size, but increased in number  This may be due to recent vaccination  In the absence of a history of recent vaccination, follow-up is needed to exclude malignancy  Patient has history of breast cancer      This was discussed with patient and she will follow-up with oncology

## 2021-03-19 NOTE — DISCHARGE SUMMARY
[facility]  Discharge- Bindu Navarro 1970, 44 y o  female MRN: [id number]  Unit/Bed#: -01 Encounter: [id number]  Primary Care Provider: CASSIDY Ortiz   Date and time admitted to hospital: 3/18/2021 10:11 AM    * Chest pain  Assessment & Plan  Gradual onset of chest tightness since 7:00 a m , worsened with deep breathing  CTA:  Negative for PE  EKG normal sinus rhythm,  Troponins negative  Telemetry unremarkable      Patient Had similar chest pain in the past when having anxiety  No chest pain at this point    DANIEL score 1      Abnormal CT scan  Assessment & Plan  Multiple left axillary nodes, unchanged in size, but increased in number  This may be due to recent vaccination  In the absence of a history of recent vaccination, follow-up is needed to exclude malignancy  Patient has history of breast cancer      This was discussed with patient and she will follow-up with oncology    Stage 3a chronic kidney disease  Assessment & Plan  Lab Results   Component Value Date    EGFR 54 03/18/2021    EGFR 53 02/08/2021    EGFR 58 12/22/2020    CREATININE 1 17 03/18/2021    CREATININE 1 19 02/08/2021    CREATININE 1 10 12/22/2020   Creatinine at baseline    Mixed hyperlipidemia  Assessment & Plan  Continue statin    Essential hypertension  Assessment & Plan  Well controlled  Continue lisinopril    Breast cancer   Assessment & Plan  Continue anastrozole    History of pulmonary embolism  Assessment & Plan  In 2020  On Eliquis        Discharging Physician / Practitioner: Kin Canales MD  PCP: CASSIDY Ortiz  Admission Date:   Admission Orders (From admission, onward)     Ordered        03/18/21 1319  Place in Observation  Once                   Discharge Date: 03/19/21    Resolved Problems  Date Reviewed: 3/15/2021    None          Consultations During Hospital Stay:  · None    Procedures Performed:   · None    Significant Findings / Test Results:   · As above    Incidental Findings:   · CT scan enlarged lymph nodes     Test Results Pending at Discharge (will require follow up): · None     Outpatient Tests Requested:  · None    Complications:  None    Reason for Admission:  Chest pain  See HPI for details  Hospital Course:     CTA did not show pulmonary embolus  He did show enlarged lymph nodes that patient will follow-up with her oncologist as outpatient  Chest pain resolved  Telemetry unremarkable  Troponins 3 times negative  EKG nonischemic  Unlikely ACS  Lulu Joe Patient will follow-up with her primary care provider  she can be discharged home today    Please see above list of diagnoses and related plan for additional information  Condition at Discharge: good     Discharge Day Visit / Exam:     Subjective:  Patient seen examined bedside  No acute events overnight  Denies any chest pain, shortness of breath, abdominal pain  From retrospect she states that she had similar chest pain in the past when she had anxiety attack  Vitals: Blood Pressure: 107/61 (03/19/21 0720)  Pulse: 78 (03/19/21 0720)  Temperature: 97 9 °F (36 6 °C) (03/19/21 0720)  Temp Source: Temporal (03/19/21 0720)  Respirations: 18 (03/19/21 0720)  Height: 5' 3" (160 cm) (03/18/21 1432)  Weight - Scale: 76 6 kg (168 lb 14 oz) (03/19/21 0600)  SpO2: 97 % (03/19/21 0720)  Exam:   Physical Exam  Vitals signs and nursing note reviewed  Constitutional:       General: She is not in acute distress  HENT:      Head: Normocephalic  Nose: Nose normal       Mouth/Throat:      Pharynx: Oropharynx is clear  Eyes:      Conjunctiva/sclera: Conjunctivae normal    Cardiovascular:      Rate and Rhythm: Normal rate and regular rhythm  Heart sounds: No murmur  Pulmonary:      Effort: Pulmonary effort is normal  No respiratory distress  Breath sounds: Normal breath sounds  No wheezing  Abdominal:      General: There is no distension  Tenderness: There is no abdominal tenderness  There is no guarding  Musculoskeletal: Normal range of motion  General: No swelling  Right lower leg: No edema  Skin:     General: Skin is warm  Capillary Refill: Capillary refill takes less than 2 seconds  Neurological:      General: No focal deficit present  Mental Status: She is alert and oriented to person, place, and time  Cranial Nerves: No cranial nerve deficit  Psychiatric:         Mood and Affect: Mood normal          Discussion with Family:  With patient    Discharge instructions/Information to patient and family:   See after visit summary for information provided to patient and family  Provisions for Follow-Up Care:  See after visit summary for information related to follow-up care and any pertinent home health orders  Disposition:     Home    For Discharges to [facility]:   · Not Applicable to this Patient - Not Applicable to this Patient    Planned Readmission:  No     Discharge Statement:  I spent 35 minutes discharging the patient  This time was spent on the day of discharge  I had direct contact with the patient on the day of discharge  Greater than 50% of the total time was spent examining patient, answering all patient questions, arranging and discussing plan of care with patient as well as directly providing post-discharge instructions  Additional time then spent on discharge activities  Discharge Medications:  See after visit summary for reconciled discharge medications provided to patient and family        ** Please Note: This note has been constructed using a voice recognition system **

## 2021-03-20 LAB
ATRIAL RATE: 71 BPM
ATRIAL RATE: 73 BPM
ATRIAL RATE: 80 BPM
P AXIS: 61 DEGREES
P AXIS: 65 DEGREES
P AXIS: 76 DEGREES
PR INTERVAL: 162 MS
PR INTERVAL: 168 MS
PR INTERVAL: 170 MS
QRS AXIS: 15 DEGREES
QRS AXIS: 20 DEGREES
QRS AXIS: 63 DEGREES
QRSD INTERVAL: 74 MS
QRSD INTERVAL: 74 MS
QRSD INTERVAL: 82 MS
QT INTERVAL: 368 MS
QT INTERVAL: 374 MS
QT INTERVAL: 410 MS
QTC INTERVAL: 412 MS
QTC INTERVAL: 424 MS
QTC INTERVAL: 445 MS
T WAVE AXIS: 29 DEGREES
T WAVE AXIS: 30 DEGREES
T WAVE AXIS: 66 DEGREES
VENTRICULAR RATE: 71 BPM
VENTRICULAR RATE: 73 BPM
VENTRICULAR RATE: 80 BPM

## 2021-03-20 PROCEDURE — 93010 ELECTROCARDIOGRAM REPORT: CPT | Performed by: INTERNAL MEDICINE

## 2021-03-24 ENCOUNTER — TRANSITIONAL CARE MANAGEMENT (OUTPATIENT)
Dept: INTERNAL MEDICINE CLINIC | Facility: CLINIC | Age: 51
End: 2021-03-24

## 2021-03-29 NOTE — ASSESSMENT & PLAN NOTE
· Hospitalized 3/18/2021 to 3/19/2021 due to chest pain  · troponins were negative  · CTA PE was negative for blood clots

## 2021-03-29 NOTE — PROGRESS NOTES
Assessment/Plan:     Chest pain  · Hospitalized 3/18/2021 to 3/19/2021 due to chest pain  · troponins were negative  · CTA PE was negative for blood clots    Essential hypertension  · Continue lisinopril    Stage 3a chronic kidney disease  Lab Results   Component Value Date    EGFR 54 03/18/2021    EGFR 53 02/08/2021    EGFR 58 12/22/2020    CREATININE 1 17 03/18/2021    CREATININE 1 19 02/08/2021    CREATININE 1 10 12/22/2020     · Stable        Diagnoses and all orders for this visit:    Chest pain on breathing    Mixed hyperlipidemia    History of pulmonary embolism    Stage 3a chronic kidney disease    Essential hypertension         Subjective:     Patient ID: Trenton Nageotte is a 44 y o  female  In [facility] from 3/18/2021 to 3/19/2021 with  substernal chest heaviness which started at approximately 5:30 a m  on the 18th  She had woke up to go to work, and noticed at approximately 7:00 am she had some chest discomfort  It started to radiate to the left clavicle area and left shoulder  She noticed that it was worse with deep breathing  She was at work while this was occurring  A co-worker suggested she go to the ER and get checked  She does have a PMHx of b/l PE and breast cancer with subsequent B/l mastectomy with implants, and was required to stop taking her eliquis as she had a colonoscopy on 3/15/2021  She had a CTA PE study which was negative for PE  Troponins were negative, and Telemetry had no events  Review of Systems   Constitutional: Negative for activity change, chills, fatigue and fever  HENT: Negative for rhinorrhea and sore throat  Eyes: Negative for pain  Respiratory: Negative for cough and shortness of breath  Cardiovascular: Negative for chest pain, palpitations and leg swelling  Gastrointestinal: Negative for abdominal pain, constipation, diarrhea, nausea and vomiting  Genitourinary: Negative for difficulty urinating, flank pain, frequency and urgency  Musculoskeletal: Negative for gait problem, joint swelling and myalgias  Skin: Negative for color change  Neurological: Negative for dizziness, weakness, light-headedness and headaches  Psychiatric/Behavioral: Negative for sleep disturbance  The patient is not nervous/anxious  All other systems reviewed and are negative  Objective:     Physical Exam  Vitals signs reviewed  Constitutional:       General: She is awake  Appearance: Normal appearance  She is well-developed  HENT:      Head: Normocephalic and atraumatic  Nose: Nose normal       Mouth/Throat:      Mouth: Mucous membranes are moist    Eyes:      Extraocular Movements: Extraocular movements intact  Neck:      Musculoskeletal: Normal range of motion  Cardiovascular:      Rate and Rhythm: Normal rate and regular rhythm  Pulses: Normal pulses  Heart sounds: Normal heart sounds  Pulmonary:      Effort: Pulmonary effort is normal       Breath sounds: Normal breath sounds  Abdominal:      General: Bowel sounds are normal       Palpations: Abdomen is soft  Musculoskeletal: Normal range of motion  Right lower leg: No edema  Left lower leg: No edema  Skin:     General: Skin is warm and dry  Neurological:      Mental Status: She is alert and oriented to person, place, and time  Psychiatric:         Attention and Perception: Attention normal          Mood and Affect: Mood normal          Speech: Speech normal          Behavior: Behavior normal  Behavior is cooperative  Vitals:    03/30/21 0902   BP: 110/72   BP Location: Left arm   Patient Position: Sitting   Cuff Size: Standard   Pulse: 73   Resp: 14   Temp: 98 °F (36 7 °C)   SpO2: 99%   Weight: 77 5 kg (170 lb 12 8 oz)   Height: 5' 3" (1 6 m)       Transitional Care Management Review:  Herman Kauffman is a 44 y o  female here for TCM follow up       During the TCM phone call patient stated:    TCM Call (since 2/27/2021)     Date and time call was made  3/25/2021  8:07 AM    Hospital care reviewed  Records reviewed    Patient was hospitialized at  [address]        Date of Admission  03/18/21    Date of discharge  03/19/21    Diagnosis  chest pain     Disposition  Home    Were the patients medications reviewed and updated  Yes    Current Symptoms  None      TCM Call (since 2/27/2021)     Post hospital issues  None    Should patient be enrolled in anticoag monitoring? No    Scheduled for follow up? Yes    Did you obtain your prescribed medications  Yes    Do you need help managing your prescriptions or medications  No    Is transportation to your appointment needed  No    I have advised the patient to call PCP with any new or worsening symptoms  liz arias     Are you recieving any outpatient services  No    Are you recieving home care services  No    Are you using any community resources  No    Current waiver services  No    Have you fallen in the last 12 months  No    Interperter language line needed  No    Counseling  Patient    Counseling topics  Diagnostic results; Prognosis; Activities of daily living; Importance of RX compliance; patient and family education; instructions for management;  Risk factor reduction          CASSIDY Cunningham

## 2021-03-30 ENCOUNTER — OFFICE VISIT (OUTPATIENT)
Dept: INTERNAL MEDICINE CLINIC | Facility: CLINIC | Age: 51
End: 2021-03-30
Payer: COMMERCIAL

## 2021-03-30 VITALS
SYSTOLIC BLOOD PRESSURE: 110 MMHG | DIASTOLIC BLOOD PRESSURE: 72 MMHG | TEMPERATURE: 98 F | HEART RATE: 73 BPM | HEIGHT: 63 IN | BODY MASS INDEX: 30.26 KG/M2 | RESPIRATION RATE: 14 BRPM | WEIGHT: 170.8 LBS | OXYGEN SATURATION: 99 %

## 2021-03-30 DIAGNOSIS — Z86.711 HISTORY OF PULMONARY EMBOLISM: ICD-10-CM

## 2021-03-30 DIAGNOSIS — E78.2 MIXED HYPERLIPIDEMIA: ICD-10-CM

## 2021-03-30 DIAGNOSIS — N18.31 STAGE 3A CHRONIC KIDNEY DISEASE (HCC): ICD-10-CM

## 2021-03-30 DIAGNOSIS — I10 ESSENTIAL HYPERTENSION: ICD-10-CM

## 2021-03-30 DIAGNOSIS — R07.1 CHEST PAIN ON BREATHING: Primary | ICD-10-CM

## 2021-03-30 PROCEDURE — 99495 TRANSJ CARE MGMT MOD F2F 14D: CPT | Performed by: NURSE PRACTITIONER

## 2021-03-30 PROCEDURE — 3008F BODY MASS INDEX DOCD: CPT | Performed by: NURSE PRACTITIONER

## 2021-03-30 PROCEDURE — 1111F DSCHRG MED/CURRENT MED MERGE: CPT | Performed by: NURSE PRACTITIONER

## 2021-03-30 NOTE — ASSESSMENT & PLAN NOTE
Lab Results   Component Value Date    EGFR 54 03/18/2021    EGFR 53 02/08/2021    EGFR 58 12/22/2020    CREATININE 1 17 03/18/2021    CREATININE 1 19 02/08/2021    CREATININE 1 10 12/22/2020     · Stable

## 2021-03-30 NOTE — PATIENT INSTRUCTIONS
Chest Wall Pain   WHAT YOU NEED TO KNOW:   Chest wall pain may be caused by problems with the muscles, cartilage, or bones of the chest wall  Chest wall pain may also be caused by pain that spreads to your chest from another part of your body  The pain may be aching, severe, dull, or sharp  It may come and go, or it may be constant  The pain may be worse when you move in certain ways, breathe deeply, or cough  DISCHARGE INSTRUCTIONS:   Call 911 if:   · You have any of the following signs of a heart attack:      ? Squeezing, pressure, or pain in your chest    ? You may  also have any of the following:     § Discomfort or pain in your back, neck, jaw, stomach, or arm    § Shortness of breath    § Nausea or vomiting    § Lightheadedness or a sudden cold sweat      Return to the emergency department if:   · You have severe pain  Contact your healthcare provider if:   · You develop a rash  · You have other new symptoms  · Your pain does not improve, even with treatment  · You have questions or concerns about your condition or care  Medicines: You may need any of the following:  · NSAIDs , such as ibuprofen, help decrease swelling, pain, and fever  This medicine is available with or without a doctor's order  NSAIDs can cause stomach bleeding or kidney problems in certain people  If you take blood thinner medicine, always ask your healthcare provider if NSAIDs are safe for you  Always read the medicine label and follow directions  · Acetaminophen  decreases pain  It is available without a doctor's order  Ask how much to take and how often to take it  Follow directions  Acetaminophen can cause liver damage if not taken correctly  · A cream  may be applied to your chest to decrease pain  · Take your medicine as directed  Contact your healthcare provider if you think your medicine is not helping or if you have side effects  Tell him of her if you are allergic to any medicine   Keep a list of the medicines, vitamins, and herbs you take  Include the amounts, and when and why you take them  Bring the list or the pill bottles to follow-up visits  Carry your medicine list with you in case of an emergency  Follow up with your healthcare provider as directed:  Write down your questions so you remember to ask them during your visits  Self-care:   · Rest  as needed  Avoid activities that make your chest wall pain worse  · Apply heat  on your chest for 20 to 30 minutes every 2 hours for as many days as directed  Heat helps decrease pain and muscle spasms  · Apply ice  on your chest for 15 to 20 minutes every hour or as directed  Use an ice pack, or put crushed ice in a plastic bag  Cover it with a towel  Ice helps prevent tissue damage and decreases swelling and pain  © Copyright [address] Information is for End User's use only and may not be sold, redistributed or otherwise used for commercial purposes  All illustrations and images included in CareNotes® are the copyrighted property of A D A M , Inc  or [address] [place], [place] [address]hanny   The above information is an  only  It is not intended as medical advice for individual conditions or treatments  Talk to your doctor, nurse or pharmacist before following any medical regimen to see if it is safe and effective for you

## 2021-05-25 DIAGNOSIS — R60.0 LOWER EXTREMITY EDEMA: Primary | ICD-10-CM

## 2021-05-25 RX ORDER — FUROSEMIDE 20 MG/1
10 TABLET ORAL DAILY
Qty: 30 TABLET | Refills: 0 | Status: SHIPPED | OUTPATIENT
Start: 2021-05-25 | End: 2021-10-08

## 2021-07-19 DIAGNOSIS — Z86.711 HISTORY OF PULMONARY EMBOLISM: Primary | ICD-10-CM

## 2021-08-16 ENCOUNTER — APPOINTMENT (OUTPATIENT)
Dept: LAB | Facility: CLINIC | Age: 51
End: 2021-08-16
Payer: COMMERCIAL

## 2021-08-27 ENCOUNTER — HOSPITAL ENCOUNTER (OUTPATIENT)
Dept: NON INVASIVE DIAGNOSTICS | Facility: CLINIC | Age: 51
Discharge: HOME | End: 2021-08-27
Payer: COMMERCIAL

## 2021-08-27 DIAGNOSIS — Z86.711 HISTORY OF PULMONARY EMBOLISM: ICD-10-CM

## 2021-08-27 PROCEDURE — 93306 TTE W/DOPPLER COMPLETE: CPT | Performed by: INTERNAL MEDICINE

## 2021-08-27 PROCEDURE — 93306 TTE W/DOPPLER COMPLETE: CPT

## 2021-09-28 ENCOUNTER — RA CDI HCC (OUTPATIENT)
Dept: OTHER | Facility: HOSPITAL | Age: 51
End: 2021-09-28

## 2021-09-28 NOTE — PROGRESS NOTES
[facility]  [facility]       Chart reviewed, no opportunity found: CHART REVIEWED, NO OPPORTUNITY FOUND                        Patients insurance company:  [employer] [facility] (Medicare Advantage and Commercial) 79yo M with COPD on home BiPAP, CAD, CABG, HTN, DM biba, [place] with report pt in SVT 190s-200s, given adenosine 6mg , then 12mg with break in svt to sinus tach at 150s.  EMS st pt became aphasic, and bilateral weakness 11 minutes pta at ED.  no report trauma, fever, n/v/d.  rest of history unknown.  wife on way to ED

## 2021-10-07 PROBLEM — Z00.00 ANNUAL PHYSICAL EXAM: Status: ACTIVE | Noted: 2021-10-07

## 2021-10-07 PROBLEM — N18.31 STAGE 3A CHRONIC KIDNEY DISEASE (HCC): Status: RESOLVED | Noted: 2021-03-18 | Resolved: 2021-10-07

## 2021-10-07 PROBLEM — N18.2 STAGE 2 CHRONIC KIDNEY DISEASE: Status: ACTIVE | Noted: 2021-03-18

## 2021-10-08 ENCOUNTER — OFFICE VISIT (OUTPATIENT)
Dept: INTERNAL MEDICINE CLINIC | Facility: CLINIC | Age: 51
End: 2021-10-08
Payer: COMMERCIAL

## 2021-10-08 VITALS
SYSTOLIC BLOOD PRESSURE: 100 MMHG | TEMPERATURE: 98.8 F | HEART RATE: 77 BPM | BODY MASS INDEX: 30.09 KG/M2 | WEIGHT: 169.8 LBS | RESPIRATION RATE: 14 BRPM | OXYGEN SATURATION: 99 % | DIASTOLIC BLOOD PRESSURE: 80 MMHG | HEIGHT: 63 IN

## 2021-10-08 DIAGNOSIS — E78.2 MIXED HYPERLIPIDEMIA: ICD-10-CM

## 2021-10-08 DIAGNOSIS — Z86.711 HISTORY OF PULMONARY EMBOLISM: ICD-10-CM

## 2021-10-08 DIAGNOSIS — R60.0 EDEMA OF BOTH LOWER EXTREMITIES: ICD-10-CM

## 2021-10-08 DIAGNOSIS — C50.011 BILATERAL MALIGNANT NEOPLASM OF AREOLA OF BREAST IN FEMALE, UNSPECIFIED ESTROGEN RECEPTOR STATUS (HCC): ICD-10-CM

## 2021-10-08 DIAGNOSIS — Z00.00 ANNUAL PHYSICAL EXAM: Primary | ICD-10-CM

## 2021-10-08 DIAGNOSIS — E66.09 CLASS 1 OBESITY DUE TO EXCESS CALORIES WITHOUT SERIOUS COMORBIDITY WITH BODY MASS INDEX (BMI) OF 30.0 TO 30.9 IN ADULT: ICD-10-CM

## 2021-10-08 DIAGNOSIS — C50.012 BILATERAL MALIGNANT NEOPLASM OF AREOLA OF BREAST IN FEMALE, UNSPECIFIED ESTROGEN RECEPTOR STATUS (HCC): ICD-10-CM

## 2021-10-08 DIAGNOSIS — N18.2 STAGE 2 CHRONIC KIDNEY DISEASE: ICD-10-CM

## 2021-10-08 PROBLEM — E66.811 CLASS 1 OBESITY DUE TO EXCESS CALORIES WITHOUT SERIOUS COMORBIDITY WITH BODY MASS INDEX (BMI) OF 30.0 TO 30.9 IN ADULT: Status: ACTIVE | Noted: 2021-10-08

## 2021-10-08 PROCEDURE — 3008F BODY MASS INDEX DOCD: CPT | Performed by: NURSE PRACTITIONER

## 2021-10-08 PROCEDURE — 99214 OFFICE O/P EST MOD 30 MIN: CPT | Performed by: NURSE PRACTITIONER

## 2021-10-08 PROCEDURE — 1036F TOBACCO NON-USER: CPT | Performed by: NURSE PRACTITIONER

## 2021-10-08 PROCEDURE — 3725F SCREEN DEPRESSION PERFORMED: CPT | Performed by: NURSE PRACTITIONER

## 2021-10-08 RX ORDER — PHENTERMINE HYDROCHLORIDE 37.5 MG/1
37.5 CAPSULE ORAL EVERY MORNING
Qty: 90 CAPSULE | Refills: 2 | Status: SHIPPED | OUTPATIENT
Start: 2021-10-08 | End: 2022-04-12

## 2021-10-08 RX ORDER — LORATADINE 10 MG/1
10 TABLET ORAL DAILY
COMMUNITY

## 2021-12-06 ENCOUNTER — OFFICE VISIT (OUTPATIENT)
Dept: URGENT CARE | Facility: CLINIC | Age: 51
End: 2021-12-06
Payer: COMMERCIAL

## 2021-12-06 VITALS
HEART RATE: 72 BPM | TEMPERATURE: 98 F | BODY MASS INDEX: 27.82 KG/M2 | DIASTOLIC BLOOD PRESSURE: 78 MMHG | SYSTOLIC BLOOD PRESSURE: 130 MMHG | HEIGHT: 63 IN | RESPIRATION RATE: 16 BRPM | OXYGEN SATURATION: 99 % | WEIGHT: 157 LBS

## 2021-12-06 DIAGNOSIS — I10 ESSENTIAL HYPERTENSION: ICD-10-CM

## 2021-12-06 DIAGNOSIS — E78.2 MIXED HYPERLIPIDEMIA: Primary | ICD-10-CM

## 2021-12-06 DIAGNOSIS — H02.89 IRRITATION OF EYELID: Primary | ICD-10-CM

## 2021-12-06 PROCEDURE — 99213 OFFICE O/P EST LOW 20 MIN: CPT

## 2021-12-06 RX ORDER — SIMVASTATIN 40 MG
40 TABLET ORAL
Qty: 90 TABLET | Refills: 3 | Status: SHIPPED | OUTPATIENT
Start: 2021-12-06

## 2021-12-06 RX ORDER — LISINOPRIL 10 MG/1
10 TABLET ORAL DAILY
Qty: 90 TABLET | Refills: 3 | Status: SHIPPED | OUTPATIENT
Start: 2021-12-06

## 2022-04-11 DIAGNOSIS — E66.09 CLASS 1 OBESITY DUE TO EXCESS CALORIES WITHOUT SERIOUS COMORBIDITY WITH BODY MASS INDEX (BMI) OF 30.0 TO 30.9 IN ADULT: ICD-10-CM

## 2022-04-12 RX ORDER — PHENTERMINE HYDROCHLORIDE 37.5 MG/1
TABLET ORAL
Qty: 90 TABLET | Refills: 0 | Status: SHIPPED | OUTPATIENT
Start: 2022-04-12 | End: 2022-06-15

## 2022-06-14 DIAGNOSIS — E66.09 CLASS 1 OBESITY DUE TO EXCESS CALORIES WITHOUT SERIOUS COMORBIDITY WITH BODY MASS INDEX (BMI) OF 30.0 TO 30.9 IN ADULT: ICD-10-CM

## 2022-06-15 RX ORDER — PHENTERMINE HYDROCHLORIDE 37.5 MG/1
TABLET ORAL
Qty: 90 TABLET | Refills: 0 | Status: SHIPPED | OUTPATIENT
Start: 2022-06-15

## 2022-10-12 PROBLEM — Z12.11 SCREEN FOR COLON CANCER: Status: RESOLVED | Noted: 2021-02-05 | Resolved: 2022-10-12

## 2022-12-08 ENCOUNTER — APPOINTMENT (OUTPATIENT)
Dept: LAB | Facility: CLINIC | Age: 52
End: 2022-12-08

## 2023-01-24 ENCOUNTER — APPOINTMENT (OUTPATIENT)
Dept: LAB | Facility: CLINIC | Age: 53
End: 2023-01-24

## 2023-01-24 DIAGNOSIS — M81.6 LOCALIZED OSTEOPOROSIS OF LEQUESNE: ICD-10-CM

## 2023-01-24 LAB
ALBUMIN SERPL BCP-MCNC: 3.7 G/DL (ref 3.5–5)
ALP SERPL-CCNC: 68 U/L (ref 46–116)
ALT SERPL W P-5'-P-CCNC: 29 U/L (ref 12–78)
ANION GAP SERPL CALCULATED.3IONS-SCNC: 3 MMOL/L (ref 4–13)
AST SERPL W P-5'-P-CCNC: 26 U/L (ref 5–45)
BASOPHILS # BLD AUTO: 0.05 THOUSANDS/ÂΜL (ref 0–0.1)
BASOPHILS NFR BLD AUTO: 1 % (ref 0–1)
BILIRUB SERPL-MCNC: 0.33 MG/DL (ref 0.2–1)
BUN SERPL-MCNC: 19 MG/DL (ref 5–25)
CALCIUM SERPL-MCNC: 9.2 MG/DL (ref 8.3–10.1)
CHLORIDE SERPL-SCNC: 109 MMOL/L (ref 96–108)
CO2 SERPL-SCNC: 27 MMOL/L (ref 21–32)
CREAT SERPL-MCNC: 1.11 MG/DL (ref 0.6–1.3)
EOSINOPHIL # BLD AUTO: 0.17 THOUSAND/ÂΜL (ref 0–0.61)
EOSINOPHIL NFR BLD AUTO: 2 % (ref 0–6)
ERYTHROCYTE [DISTWIDTH] IN BLOOD BY AUTOMATED COUNT: 13.3 % (ref 11.6–15.1)
GFR SERPL CREATININE-BSD FRML MDRD: 56 ML/MIN/1.73SQ M
GLUCOSE P FAST SERPL-MCNC: 93 MG/DL (ref 65–99)
HCT VFR BLD AUTO: 45 % (ref 34.8–46.1)
HGB BLD-MCNC: 14.5 G/DL (ref 11.5–15.4)
IMM GRANULOCYTES # BLD AUTO: 0.02 THOUSAND/UL (ref 0–0.2)
IMM GRANULOCYTES NFR BLD AUTO: 0 % (ref 0–2)
LYMPHOCYTES # BLD AUTO: 1.87 THOUSANDS/ÂΜL (ref 0.6–4.47)
LYMPHOCYTES NFR BLD AUTO: 27 % (ref 14–44)
MCH RBC QN AUTO: 28.5 PG (ref 26.8–34.3)
MCHC RBC AUTO-ENTMCNC: 32.2 G/DL (ref 31.4–37.4)
MCV RBC AUTO: 88 FL (ref 82–98)
MONOCYTES # BLD AUTO: 0.52 THOUSAND/ÂΜL (ref 0.17–1.22)
MONOCYTES NFR BLD AUTO: 7 % (ref 4–12)
NEUTROPHILS # BLD AUTO: 4.42 THOUSANDS/ÂΜL (ref 1.85–7.62)
NEUTS SEG NFR BLD AUTO: 63 % (ref 43–75)
NRBC BLD AUTO-RTO: 0 /100 WBCS
PLATELET # BLD AUTO: 226 THOUSANDS/UL (ref 149–390)
PMV BLD AUTO: 9.5 FL (ref 8.9–12.7)
POTASSIUM SERPL-SCNC: 5.1 MMOL/L (ref 3.5–5.3)
PROT SERPL-MCNC: 7.3 G/DL (ref 6.4–8.4)
RBC # BLD AUTO: 5.09 MILLION/UL (ref 3.81–5.12)
SODIUM SERPL-SCNC: 139 MMOL/L (ref 135–147)
WBC # BLD AUTO: 7.05 THOUSAND/UL (ref 4.31–10.16)

## 2023-02-03 DIAGNOSIS — E78.2 MIXED HYPERLIPIDEMIA: ICD-10-CM

## 2023-02-03 DIAGNOSIS — I10 ESSENTIAL HYPERTENSION: ICD-10-CM

## 2023-02-03 RX ORDER — LISINOPRIL 10 MG/1
10 TABLET ORAL DAILY
Qty: 90 TABLET | Refills: 0 | Status: SHIPPED | OUTPATIENT
Start: 2023-02-03

## 2023-02-03 RX ORDER — SIMVASTATIN 40 MG
40 TABLET ORAL
Qty: 90 TABLET | Refills: 0 | Status: SHIPPED | OUTPATIENT
Start: 2023-02-03

## 2023-02-20 ENCOUNTER — OFFICE VISIT (OUTPATIENT)
Dept: INTERNAL MEDICINE CLINIC | Facility: CLINIC | Age: 53
End: 2023-02-20

## 2023-02-20 VITALS
HEART RATE: 90 BPM | OXYGEN SATURATION: 98 % | BODY MASS INDEX: 34.98 KG/M2 | DIASTOLIC BLOOD PRESSURE: 80 MMHG | HEIGHT: 63 IN | WEIGHT: 197.4 LBS | SYSTOLIC BLOOD PRESSURE: 118 MMHG | TEMPERATURE: 97.8 F

## 2023-02-20 DIAGNOSIS — E78.2 MIXED HYPERLIPIDEMIA: ICD-10-CM

## 2023-02-20 DIAGNOSIS — C50.011 BILATERAL MALIGNANT NEOPLASM OF AREOLA OF BREAST IN FEMALE, UNSPECIFIED ESTROGEN RECEPTOR STATUS (HCC): ICD-10-CM

## 2023-02-20 DIAGNOSIS — N02.8 IGA NEPHROPATHY: ICD-10-CM

## 2023-02-20 DIAGNOSIS — I10 ESSENTIAL HYPERTENSION: ICD-10-CM

## 2023-02-20 DIAGNOSIS — C50.012 BILATERAL MALIGNANT NEOPLASM OF AREOLA OF BREAST IN FEMALE, UNSPECIFIED ESTROGEN RECEPTOR STATUS (HCC): ICD-10-CM

## 2023-02-20 DIAGNOSIS — E66.09 CLASS 1 OBESITY DUE TO EXCESS CALORIES WITHOUT SERIOUS COMORBIDITY WITH BODY MASS INDEX (BMI) OF 30.0 TO 30.9 IN ADULT: ICD-10-CM

## 2023-02-20 DIAGNOSIS — E55.9 VITAMIN D DEFICIENCY: ICD-10-CM

## 2023-02-20 DIAGNOSIS — R60.0 EDEMA OF BOTH LOWER EXTREMITIES: ICD-10-CM

## 2023-02-20 DIAGNOSIS — Z13.820 OSTEOPOROSIS SCREENING: ICD-10-CM

## 2023-02-20 DIAGNOSIS — N18.2 STAGE 2 CHRONIC KIDNEY DISEASE: ICD-10-CM

## 2023-02-20 DIAGNOSIS — Z00.00 ANNUAL PHYSICAL EXAM: Primary | ICD-10-CM

## 2023-02-20 DIAGNOSIS — Z86.711 HISTORY OF PULMONARY EMBOLISM: ICD-10-CM

## 2023-02-20 RX ORDER — SIMVASTATIN 40 MG
40 TABLET ORAL
Qty: 90 TABLET | Refills: 1 | Status: SHIPPED | OUTPATIENT
Start: 2023-02-20

## 2023-02-20 RX ORDER — TOPIRAMATE 25 MG/1
25 CAPSULE, COATED PELLETS ORAL DAILY
Qty: 90 CAPSULE | Refills: 1 | Status: SHIPPED | OUTPATIENT
Start: 2023-02-20

## 2023-02-20 RX ORDER — LISINOPRIL 10 MG/1
10 TABLET ORAL DAILY
Qty: 90 TABLET | Refills: 1 | Status: SHIPPED | OUTPATIENT
Start: 2023-02-20

## 2023-02-20 RX ORDER — AFLIBERCEPT 40 MG/ML
INJECTION, SOLUTION INTRAVITREAL
COMMUNITY

## 2023-02-20 RX ORDER — PHENTERMINE HYDROCHLORIDE 37.5 MG/1
37.5 CAPSULE ORAL EVERY MORNING
Qty: 90 CAPSULE | Refills: 1 | Status: SHIPPED | OUTPATIENT
Start: 2023-02-20

## 2023-02-20 NOTE — ASSESSMENT & PLAN NOTE
• Lifestyle modification, diet and exercise discussed  • Medications discussed and refilled appropriately  • Labs discussed and ordered   • Hepatitis C screening discussed  • HIV screening discussed  • Depression screening performed  • Anxiety screening performed  • Urinary Incontinence screening performed   • BMI discussed  • Osteoporosis screening performed and ordered    • Fall screening performed

## 2023-02-20 NOTE — PROGRESS NOTES
[facility]    NAME: Lou Choi  AGE: 45 y o  SEX: female  : 1970     DATE: 2023     Assessment and Plan:     Problem List Items Addressed This Visit        Cardiovascular and Mediastinum    Essential hypertension    Relevant Medications    lisinopril (ZESTRIL) 10 mg tablet       Genitourinary    IgA nephropathy    Stage 2 chronic kidney disease       Other    History of pulmonary embolism    Breast cancer     Mixed hyperlipidemia    Relevant Medications    simvastatin (ZOCOR) 40 mg tablet    Other Relevant Orders    Lipid panel    Annual physical exam - Primary     • Lifestyle modification, diet and exercise discussed  • Medications discussed and refilled appropriately  • Labs discussed and ordered   • Hepatitis C screening discussed  • HIV screening discussed  • Depression screening performed  • Anxiety screening performed  • Urinary Incontinence screening performed   • BMI discussed  • Osteoporosis screening performed and ordered    • Fall screening performed         Relevant Orders    CBC and differential    Comprehensive metabolic panel    Edema of both lower extremities    Class 1 obesity due to excess calories without serious comorbidity with body mass index (BMI) of 30 0 to 30 9 in adult    Relevant Medications    phentermine 37 5 MG capsule    topiramate (TOPAMAX) 25 mg sprinkle capsule    Osteoporosis screening    Relevant Orders    DXA bone density spine hip and pelvis    Vitamin D deficiency    Relevant Orders    Vitamin D 25 hydroxy       Immunizations and preventive care screenings were discussed with patient today  Appropriate education was printed on patient's after visit summary  Counseling:  Alcohol/drug use: discussed moderation in alcohol intake, the recommendations for healthy alcohol use, and avoidance of illicit drug use    Dental Health: discussed importance of regular tooth brushing, flossing, and dental visits  Injury prevention: discussed safety/seat belts, safety helmets, smoke detectors, carbon dioxide detectors, and smoking near bedding or upholstery  Sexual health: discussed sexually transmitted diseases, partner selection, use of condoms, avoidance of unintended pregnancy, and contraceptive alternatives  · Exercise: the importance of regular exercise/physical activity was discussed  Recommend exercise 3-5 times per week for at least 30 minutes  BMI Counseling: Body mass index is 34 97 kg/m²  The BMI is above normal  Nutrition recommendations include decreasing portion sizes, encouraging healthy choices of fruits and vegetables, decreasing fast food intake, consuming healthier snacks, limiting drinks that contain sugar, moderation in carbohydrate intake, increasing intake of lean protein, reducing intake of saturated and trans fat and reducing intake of cholesterol  Exercise recommendations include exercising 3-5 times per week  No pharmacotherapy was ordered  Rationale for BMI follow-up plan is due to patient being overweight or obese  Depression Screening and Follow-up Plan: Patient was screened for depression during today's encounter  They screened negative with a PHQ-2 score of 0  Return in about 6 months (around 8/20/2023) for Recheck  Chief Complaint:     Chief Complaint   Patient presents with   • Follow-up     Rtn  c/o gaining weight, swollen ankles       History of Present Illness:     Adult Annual Physical   Patient here for a comprehensive physical exam  The patient reports problems - as discussed   Diet and Physical Activity  · Diet/Nutrition: well balanced diet  · Exercise: 3-4 times a week on average        Depression Screening  PHQ-2/9 Depression Screening    Little interest or pleasure in doing things: 0 - not at all  Feeling down, depressed, or hopeless: 0 - not at all  PHQ-2 Score: 0  PHQ-2 Interpretation: Negative depression screen       General Health  · Sleep: sleeps well  · Hearing: normal - bilateral   · Vision: wears glasses  · Dental: regular dental visits  /GYN Health  · Patient is: Renee Moscoso · Last menstrual period:   · Contraceptive method:   Renee Blend Review of Systems:     Review of Systems   Constitutional: Negative for activity change, chills, fatigue and fever  HENT: Negative for rhinorrhea and sore throat  Eyes: Negative for pain  Respiratory: Negative for cough and shortness of breath  Cardiovascular: Negative for chest pain, palpitations and leg swelling  Gastrointestinal: Negative for abdominal pain, constipation, diarrhea, nausea and vomiting  Genitourinary: Negative for difficulty urinating, flank pain, frequency and urgency  Musculoskeletal: Negative for gait problem, joint swelling and myalgias  Skin: Negative for color change  Neurological: Negative for dizziness, weakness, light-headedness and headaches  Psychiatric/Behavioral: Negative for sleep disturbance  The patient is not nervous/anxious  All other systems reviewed and are negative       Past Medical History:     Past Medical History:   Diagnosis Date   • Allergic rhinitis    • IgA nephropathy       Past Surgical History:     Past Surgical History:   Procedure Laterality Date   • BREAST BIOPSY Left     l breast   • ENDOMETRIAL BIOPSY     • IR BIOPSY KIDNEY MASS  12/19/2018   • MASTECTOMY Bilateral 07/25/2019      Social History:     Social History     Socioeconomic History   • Marital status:      Spouse name: None   • Number of children: None   • Years of education: None   • Highest education level: None   Occupational History   • Occupation: EMPLOYED   Tobacco Use   • Smoking status: Never   • Smokeless tobacco: Never   Vaping Use   • Vaping Use: Never used   Substance and Sexual Activity   • Alcohol use: Yes     Comment: socially   • Drug use: No   • Sexual activity: Not Currently     Partners: Male   Other Topics Concern   • None   Social History Narrative    EMPLOYED         Social Determinants of Health     Financial Resource Strain: Not on file   Food Insecurity: Not on file   Transportation Needs: Not on file   Physical Activity: Not on file   Stress: Not on file   Social Connections: Not on file   Intimate Partner Violence: Not on file   Housing Stability: Not on file      Family History:     Family History   Problem Relation Age of Onset   • Breast cancer Mother    • Hernia Father       Current Medications:     Current Outpatient Medications   Medication Sig Dispense Refill   • aflibercept (Eylea) 2 MG/0 05ML SOLN by Intravitreal route     • anastrozole (ARIMIDEX) 1 mg tablet      • apixaban (ELIQUIS) 5 mg Take 2 tablets (10 mg total) by mouth 2 (two) times a day for 7 days, THEN 1 tablet (5 mg total) 2 (two) times a day for 23 days  74 tablet 0   • Calcium Carbonate-Vit D-Min (CALCIUM 1200 PO) Take by mouth daily     • Cholecalciferol (Vitamin D3) 50 MCG (2000 UT) TABS Take 2,000 Units by mouth daily     • denosumab (PROLIA) 60 mg/mL Inject 60 mg under the skin once 1/6/23 first inj,     • levocetirizine (XYZAL) 5 MG tablet Take 5 mg by mouth every evening     • lisinopril (ZESTRIL) 10 mg tablet Take 1 tablet (10 mg total) by mouth daily 90 tablet 1   • loratadine (CLARITIN) 10 mg tablet Take 10 mg by mouth daily     • Multiple Vitamin (multivitamin) tablet Take 1 tablet by mouth daily     • phentermine 37 5 MG capsule Take 1 capsule (37 5 mg total) by mouth every morning 90 capsule 1   • Potassium 99 MG TABS Take 99 mg by mouth 2 (two) times a day 4 tablets = 20mEq  2 tablets = 10mEq     • simvastatin (ZOCOR) 40 mg tablet Take 1 tablet (40 mg total) by mouth daily at bedtime 90 tablet 1   • topiramate (TOPAMAX) 25 mg sprinkle capsule Take 1 capsule (25 mg total) by mouth daily 90 capsule 1     No current facility-administered medications for this visit  Allergies:      Allergies   Allergen Reactions   • Bactrim [Sulfamethoxazole-Trimethoprim]    • Sulfasalazine Rash      Physical Exam:     /80 (BP Location: Left arm, Patient Position: Sitting, Cuff Size: Standard)   Pulse 90   Temp 97 8 °F (36 6 °C)   Ht 5' 3" (1 6 m)   Wt 89 5 kg (197 lb 6 4 oz)   LMP 07/27/2019   SpO2 98%   BMI 34 97 kg/m²     Physical Exam  Vitals and nursing note reviewed  Constitutional:       General: She is awake  She is not in acute distress  Appearance: Normal appearance  She is well-developed and overweight  HENT:      Head: Normocephalic and atraumatic  Nose: Nose normal       Mouth/Throat:      Mouth: Mucous membranes are moist    Eyes:      Conjunctiva/sclera: Conjunctivae normal    Cardiovascular:      Rate and Rhythm: Normal rate and regular rhythm  Pulses: Normal pulses  Heart sounds: Normal heart sounds  No murmur heard  Pulmonary:      Effort: Pulmonary effort is normal  No respiratory distress  Breath sounds: Normal breath sounds  Abdominal:      General: Bowel sounds are normal       Palpations: Abdomen is soft  Tenderness: There is no abdominal tenderness  Musculoskeletal:      Cervical back: Neck supple  Right lower leg: No edema  Left lower leg: No edema  Skin:     General: Skin is warm and dry  Neurological:      Mental Status: She is alert and oriented to person, place, and time  Psychiatric:         Attention and Perception: Attention normal          Mood and Affect: Mood normal          Speech: Speech normal          Behavior: Behavior normal  Behavior is cooperative            CASSIDY Alrdidge  [facility]

## 2023-02-20 NOTE — PATIENT INSTRUCTIONS
Problem List Items Addressed This Visit          Cardiovascular and Mediastinum    Essential hypertension    Relevant Medications    lisinopril (ZESTRIL) 10 mg tablet       Genitourinary    IgA nephropathy    Stage 2 chronic kidney disease       Other    History of pulmonary embolism    Breast cancer     Mixed hyperlipidemia    Relevant Medications    simvastatin (ZOCOR) 40 mg tablet    Other Relevant Orders    Lipid panel    Annual physical exam - Primary     Lifestyle modification, diet and exercise discussed  Medications discussed and refilled appropriately  Labs discussed and ordered   Hepatitis C screening discussed  HIV screening discussed  Depression screening performed  Anxiety screening performed  Urinary Incontinence screening performed   BMI discussed  Osteoporosis screening performed and ordered    Fall screening performed         Relevant Orders    CBC and differential    Comprehensive metabolic panel    Edema of both lower extremities    Class 1 obesity due to excess calories without serious comorbidity with body mass index (BMI) of 30 0 to 30 9 in adult    Relevant Medications    phentermine 37 5 MG capsule    topiramate (TOPAMAX) 25 mg sprinkle capsule    Osteoporosis screening    Relevant Orders    DXA bone density spine hip and pelvis    Vitamin D deficiency    Relevant Orders    Vitamin D 25 hydroxy

## 2023-03-18 NOTE — ASSESSMENT & PLAN NOTE
Lab Results   Component Value Date    EGFR 54 03/18/2021    EGFR 53 02/08/2021    EGFR 58 12/22/2020    CREATININE 1 17 03/18/2021    CREATININE 1 19 02/08/2021    CREATININE 1 10 12/22/2020   Creatinine at baseline left shoulder flex to ~60 degrees, elbow WFL; knees "stiff" bilaterally/Right UE Active ROM was WFL (within functional limits)/bilateral  lower extremity Active ROM was WFL (within functional limits)

## 2023-04-21 PROBLEM — Z13.820 OSTEOPOROSIS SCREENING: Status: RESOLVED | Noted: 2023-02-20 | Resolved: 2023-04-21

## 2023-05-17 ENCOUNTER — CLINICAL SUPPORT (OUTPATIENT)
Dept: INTERNAL MEDICINE CLINIC | Facility: CLINIC | Age: 53
End: 2023-05-17

## 2023-05-17 DIAGNOSIS — Z01.818 PRE-OP TESTING: Primary | ICD-10-CM

## 2023-05-17 LAB — ACCELERATIONS > 10BPM: 80

## 2023-05-24 ENCOUNTER — RA CDI HCC (OUTPATIENT)
Dept: OTHER | Facility: HOSPITAL | Age: 53
End: 2023-05-24

## 2023-06-01 ENCOUNTER — CONSULT (OUTPATIENT)
Dept: INTERNAL MEDICINE CLINIC | Facility: CLINIC | Age: 53
End: 2023-06-01

## 2023-06-01 VITALS
TEMPERATURE: 98.1 F | HEART RATE: 95 BPM | DIASTOLIC BLOOD PRESSURE: 86 MMHG | SYSTOLIC BLOOD PRESSURE: 128 MMHG | WEIGHT: 188.6 LBS | OXYGEN SATURATION: 98 % | BODY MASS INDEX: 33.41 KG/M2

## 2023-06-01 DIAGNOSIS — E55.9 VITAMIN D DEFICIENCY: ICD-10-CM

## 2023-06-01 DIAGNOSIS — E78.2 MIXED HYPERLIPIDEMIA: ICD-10-CM

## 2023-06-01 DIAGNOSIS — Z01.818 PREOP EXAMINATION: Primary | ICD-10-CM

## 2023-06-01 DIAGNOSIS — C50.012 BILATERAL MALIGNANT NEOPLASM OF AREOLA OF BREAST IN FEMALE, UNSPECIFIED ESTROGEN RECEPTOR STATUS (HCC): ICD-10-CM

## 2023-06-01 DIAGNOSIS — N18.2 STAGE 2 CHRONIC KIDNEY DISEASE: ICD-10-CM

## 2023-06-01 DIAGNOSIS — C50.011 BILATERAL MALIGNANT NEOPLASM OF AREOLA OF BREAST IN FEMALE, UNSPECIFIED ESTROGEN RECEPTOR STATUS (HCC): ICD-10-CM

## 2023-06-01 DIAGNOSIS — I10 ESSENTIAL HYPERTENSION: ICD-10-CM

## 2023-06-01 NOTE — PROGRESS NOTES
Presurgical Evaluation    Subjective:      Patient ID: Frank Hurt is a 52 y o  female  Chief Complaint   Patient presents with   • Pre-op Exam     [facility] surgery 6/20 Dr Elvia Green, paperwork was faxed over EKG was done weeks prior        The patient is here today for preoperative clearance for D&C, due to postmenopausal, by Floydene Lundborg, to be performed on 6/20/2023  The patient is medically stable for this procedure as planned  The following portions of the patient's history were reviewed and updated as appropriate: allergies, current medications, past family history, past medical history, past social history, past surgical history and problem list     Procedure date: June 20, 2023    Surgeon:  Floydene Lundborg  Planned procedure:  D&C  Diagnosis for procedure:  Postmenopausal bleeding    Prior anesthesia: Yes   General; Complications:  None / Tolerated well    CAD History: None    Pulmonary History: None    Renal history: None    Diabetes History:  None     Neurological History: None     On Immunosuppressant meds/biologics: Yes      Review of Systems   Constitutional: Negative for activity change, chills, fatigue and fever  HENT: Negative for rhinorrhea and sore throat  Eyes: Negative for pain  Respiratory: Negative for cough and shortness of breath  Cardiovascular: Negative for chest pain, palpitations and leg swelling  Gastrointestinal: Negative for abdominal pain, constipation, diarrhea, nausea and vomiting  Genitourinary: Negative for difficulty urinating, flank pain, frequency and urgency  Musculoskeletal: Negative for gait problem, joint swelling and myalgias  Skin: Negative for color change  Neurological: Negative for dizziness, weakness, light-headedness and headaches  Psychiatric/Behavioral: Negative for sleep disturbance  The patient is not nervous/anxious  All other systems reviewed and are negative          Current Outpatient Medications   Medication Sig Dispense Refill   • aflibercept (Eylea) 2 MG/0 05ML SOLN by Intravitreal route     • anastrozole (ARIMIDEX) 1 mg tablet      • apixaban (ELIQUIS) 5 mg Take 2 tablets (10 mg total) by mouth 2 (two) times a day for 7 days, THEN 1 tablet (5 mg total) 2 (two) times a day for 23 days  74 tablet 0   • Calcium Carbonate-Vit D-Min (CALCIUM 1200 PO) Take by mouth daily     • Cholecalciferol (Vitamin D3) 50 MCG (2000 UT) TABS Take 2,000 Units by mouth daily     • denosumab (PROLIA) 60 mg/mL Inject 60 mg under the skin once 1/6/23 first inj,     • levocetirizine (XYZAL) 5 MG tablet Take 5 mg by mouth every evening     • lisinopril (ZESTRIL) 10 mg tablet Take 1 tablet (10 mg total) by mouth daily 90 tablet 1   • loratadine (CLARITIN) 10 mg tablet Take 10 mg by mouth daily     • Multiple Vitamin (multivitamin) tablet Take 1 tablet by mouth daily     • phentermine 37 5 MG capsule Take 1 capsule (37 5 mg total) by mouth every morning 90 capsule 1   • Potassium 99 MG TABS Take 99 mg by mouth 2 (two) times a day 4 tablets = 20mEq  2 tablets = 10mEq     • simvastatin (ZOCOR) 40 mg tablet Take 1 tablet (40 mg total) by mouth daily at bedtime 90 tablet 1   • topiramate (TOPAMAX) 25 mg sprinkle capsule Take 1 capsule (25 mg total) by mouth daily 90 capsule 1     No current facility-administered medications for this visit         Allergies on file:   Bactrim [sulfamethoxazole-trimethoprim] and Sulfasalazine    Patient Active Problem List   Diagnosis   • Right leg pain   • History of pulmonary embolism   • Breast cancer    • Essential hypertension   • Mixed hyperlipidemia   • IgA nephropathy   • Stage 2 chronic kidney disease   • Chest pain   • Abnormal CT scan   • Annual physical exam   • Edema of both lower extremities   • Class 1 obesity due to excess calories without serious comorbidity with body mass index (BMI) of 30 0 to 30 9 in adult   • Vitamin D deficiency   • Preop examination        Past Medical History:   Diagnosis Date • Allergic rhinitis    • IgA nephropathy        Past Surgical History:   Procedure Laterality Date   • BREAST BIOPSY Left     l breast   • ENDOMETRIAL BIOPSY     • IR BIOPSY KIDNEY MASS  12/19/2018   • MASTECTOMY Bilateral 07/25/2019       Family History   Problem Relation Age of Onset   • Breast cancer Mother    • Hernia Father        Social History     Tobacco Use   • Smoking status: Never   • Smokeless tobacco: Never   Vaping Use   • Vaping Use: Never used   Substance Use Topics   • Alcohol use: Yes     Comment: socially   • Drug use: No       Objective:    Vitals:    06/01/23 1325   BP: 128/86   BP Location: Left arm   Patient Position: Sitting   Cuff Size: Standard   Pulse: 95   Temp: 98 1 °F (36 7 °C)   SpO2: 98%   Weight: 85 5 kg (188 lb 9 6 oz)        Physical Exam  Vitals reviewed  Constitutional:       General: She is awake  Appearance: Normal appearance  She is well-developed and normal weight  HENT:      Head: Normocephalic and atraumatic  Nose: Nose normal       Mouth/Throat:      Mouth: Mucous membranes are moist    Eyes:      Extraocular Movements: Extraocular movements intact  Cardiovascular:      Rate and Rhythm: Normal rate and regular rhythm  Pulses: Normal pulses  Heart sounds: Normal heart sounds  Pulmonary:      Effort: Pulmonary effort is normal       Breath sounds: Normal breath sounds  Abdominal:      General: Bowel sounds are normal       Palpations: Abdomen is soft  Musculoskeletal:         General: Normal range of motion  Cervical back: Normal range of motion  Right lower leg: No edema  Left lower leg: No edema  Skin:     General: Skin is warm and dry  Neurological:      Mental Status: She is alert and oriented to person, place, and time  Psychiatric:         Attention and Perception: Attention normal          Mood and Affect: Mood normal          Speech: Speech normal          Behavior: Behavior normal  Behavior is cooperative  Preop labs/testing available and reviewed: yes               EKG yes    Echo no    Stress test/cath no    PFT/Khang no    Functional capacity: Shovel snow                          6 Mets   Pick the highest level patient can comfortably perform   4 mets or greater for surgery    RCRI  High Risk surgery? 1 Point  CAD History:         1 Point   MI; Positive Stress Test; CP due to Mi;  Nitrate Usage to control Angina; Pathologic Q wave on EKG  CHF Active:         1 Point   Pulm Edema; Paroxysmal Nocturnal Dyspnea;  Bibasilar Rales (crackles);S3; CHF on CXR  Cerebrovascular Disease (TIA or CVA):     1 Point  DM on Insulin:        1 Point  Serum Creat >2 0 mg/dl:       1 Point          Total Points: 6     Scorin: Class I, Very Low Risk (0 4%)     1: Class II, Low risk (0 9%)     2: Class III Moderate (6 6%)     3: Class IV High (>11%)      MARK Risk:  GFR:        For PCP:  If GFR>60, Hold ACE/ARB/Diuretic on the day of surgery, and NSAIDS 10 days before  If GFR<45, Consider PRE and POST op Nephrology Consult  If 46 <GFR> 59 : Has Patient had MARK in last 6 Months? no   If YES: Preop Nephrology consult   If No:  Lajosef 26 Nephrology consult  Assessment/Plan:    Patient is medically optimized (cleared) for the planned procedure  Further testing/evaluation is not required  Postop concerns: no    Problem List Items Addressed This Visit        Cardiovascular and Mediastinum    Essential hypertension       Genitourinary    Stage 2 chronic kidney disease       Other    Breast cancer     Mixed hyperlipidemia    Relevant Orders    Lipid panel    Vitamin D deficiency    Relevant Orders    Vitamin D 25 hydroxy    Preop examination - Primary     The patient is here today for preoperative clearance for D&C, due to postmenopausal, by Hallie Uribe, to be performed on 2023  The patient is medically stable for this procedure as planned               Diagnoses and all orders for this visit:    Preop examination    Essential hypertension    Stage 2 chronic kidney disease    Bilateral malignant neoplasm of areola of breast in female, unspecified estrogen receptor status (HCC)    Mixed hyperlipidemia  -     Lipid panel; Future    Vitamin D deficiency  -     Vitamin D 25 hydroxy; Future          No outpatient medications have been marked as taking for the 6/1/23 encounter (Appointment) with [facility], [address]

## 2023-06-01 NOTE — ASSESSMENT & PLAN NOTE
• The patient is here today for preoperative clearance for D&C, due to postmenopausal, by Mayco Prajapati, to be performed on 6/20/2023  • The patient is medically stable for this procedure as planned

## 2023-06-01 NOTE — PATIENT INSTRUCTIONS
Problem List Items Addressed This Visit          Cardiovascular and Mediastinum    Essential hypertension       Genitourinary    Stage 2 chronic kidney disease       Other    Breast cancer     Mixed hyperlipidemia    Relevant Orders    Lipid panel    Vitamin D deficiency    Relevant Orders    Vitamin D 25 hydroxy    Preop examination - Primary     The patient is here today for preoperative clearance for D&C, due to postmenopausal, by Mayco Prajapati, to be performed on 6/20/2023  The patient is medically stable for this procedure as planned

## 2023-06-13 ENCOUNTER — APPOINTMENT (OUTPATIENT)
Age: 53
End: 2023-06-13
Payer: COMMERCIAL

## 2023-06-13 DIAGNOSIS — E55.9 VITAMIN D DEFICIENCY: ICD-10-CM

## 2023-06-13 DIAGNOSIS — Z01.818 OTHER SPECIFIED PRE-OPERATIVE EXAMINATION: ICD-10-CM

## 2023-06-13 DIAGNOSIS — C50.411 MALIGNANT NEOPLASM OF UPPER-OUTER QUADRANT OF RIGHT FEMALE BREAST, UNSPECIFIED ESTROGEN RECEPTOR STATUS (HCC): ICD-10-CM

## 2023-06-13 DIAGNOSIS — Z79.811 USE OF AROMATASE INHIBITORS: ICD-10-CM

## 2023-06-13 DIAGNOSIS — Z79.01 LONG TERM (CURRENT) USE OF ANTICOAGULANTS: ICD-10-CM

## 2023-06-13 DIAGNOSIS — E78.2 MIXED HYPERLIPIDEMIA: ICD-10-CM

## 2023-06-13 DIAGNOSIS — N95.0 POSTMENOPAUSAL BLEEDING: ICD-10-CM

## 2023-06-14 ENCOUNTER — APPOINTMENT (OUTPATIENT)
Dept: LAB | Facility: HOSPITAL | Age: 53
End: 2023-06-14
Payer: COMMERCIAL

## 2023-06-14 DIAGNOSIS — E55.9 VITAMIN D DEFICIENCY: ICD-10-CM

## 2023-06-14 DIAGNOSIS — Z00.00 ANNUAL PHYSICAL EXAM: ICD-10-CM

## 2023-06-14 DIAGNOSIS — E78.2 MIXED HYPERLIPIDEMIA: ICD-10-CM

## 2023-06-14 LAB
25(OH)D3 SERPL-MCNC: 65.2 NG/ML (ref 30–100)
ALBUMIN SERPL BCP-MCNC: 4.3 G/DL (ref 3.5–5)
ALP SERPL-CCNC: 56 U/L (ref 34–104)
ALT SERPL W P-5'-P-CCNC: 18 U/L (ref 7–52)
ANION GAP SERPL CALCULATED.3IONS-SCNC: 8 MMOL/L (ref 4–13)
AST SERPL W P-5'-P-CCNC: 21 U/L (ref 13–39)
BASOPHILS # BLD AUTO: 0.05 THOUSANDS/ÂΜL (ref 0–0.1)
BASOPHILS NFR BLD AUTO: 1 % (ref 0–1)
BILIRUB SERPL-MCNC: 0.47 MG/DL (ref 0.2–1)
BUN SERPL-MCNC: 14 MG/DL (ref 5–25)
CALCIUM SERPL-MCNC: 9.2 MG/DL (ref 8.4–10.2)
CHLORIDE SERPL-SCNC: 107 MMOL/L (ref 96–108)
CHOLEST SERPL-MCNC: 161 MG/DL
CO2 SERPL-SCNC: 21 MMOL/L (ref 21–32)
CREAT SERPL-MCNC: 1.11 MG/DL (ref 0.6–1.3)
EOSINOPHIL # BLD AUTO: 0.16 THOUSAND/ÂΜL (ref 0–0.61)
EOSINOPHIL NFR BLD AUTO: 2 % (ref 0–6)
ERYTHROCYTE [DISTWIDTH] IN BLOOD BY AUTOMATED COUNT: 13.1 % (ref 11.6–15.1)
GFR SERPL CREATININE-BSD FRML MDRD: 56 ML/MIN/1.73SQ M
GLUCOSE P FAST SERPL-MCNC: 94 MG/DL (ref 65–99)
HCT VFR BLD AUTO: 46.5 % (ref 34.8–46.1)
HDLC SERPL-MCNC: 53 MG/DL
HGB BLD-MCNC: 14.9 G/DL (ref 11.5–15.4)
IMM GRANULOCYTES # BLD AUTO: 0.01 THOUSAND/UL (ref 0–0.2)
IMM GRANULOCYTES NFR BLD AUTO: 0 % (ref 0–2)
LDLC SERPL CALC-MCNC: 74 MG/DL (ref 0–100)
LYMPHOCYTES # BLD AUTO: 1.83 THOUSANDS/ÂΜL (ref 0.6–4.47)
LYMPHOCYTES NFR BLD AUTO: 28 % (ref 14–44)
MCH RBC QN AUTO: 28.6 PG (ref 26.8–34.3)
MCHC RBC AUTO-ENTMCNC: 32 G/DL (ref 31.4–37.4)
MCV RBC AUTO: 89 FL (ref 82–98)
MONOCYTES # BLD AUTO: 0.38 THOUSAND/ÂΜL (ref 0.17–1.22)
MONOCYTES NFR BLD AUTO: 6 % (ref 4–12)
NEUTROPHILS # BLD AUTO: 4.17 THOUSANDS/ÂΜL (ref 1.85–7.62)
NEUTS SEG NFR BLD AUTO: 63 % (ref 43–75)
NONHDLC SERPL-MCNC: 108 MG/DL
NRBC BLD AUTO-RTO: 0 /100 WBCS
PLATELET # BLD AUTO: 249 THOUSANDS/UL (ref 149–390)
PMV BLD AUTO: 8.9 FL (ref 8.9–12.7)
POTASSIUM SERPL-SCNC: 4 MMOL/L (ref 3.5–5.3)
PROT SERPL-MCNC: 7.5 G/DL (ref 6.4–8.4)
RBC # BLD AUTO: 5.21 MILLION/UL (ref 3.81–5.12)
SODIUM SERPL-SCNC: 136 MMOL/L (ref 135–147)
TRIGL SERPL-MCNC: 170 MG/DL
WBC # BLD AUTO: 6.6 THOUSAND/UL (ref 4.31–10.16)

## 2023-06-14 PROCEDURE — 80053 COMPREHEN METABOLIC PANEL: CPT

## 2023-06-14 PROCEDURE — 85025 COMPLETE CBC W/AUTO DIFF WBC: CPT

## 2023-06-14 PROCEDURE — 80061 LIPID PANEL: CPT

## 2023-06-14 PROCEDURE — 82306 VITAMIN D 25 HYDROXY: CPT

## 2023-06-14 PROCEDURE — 36415 COLL VENOUS BLD VENIPUNCTURE: CPT

## 2023-06-22 ENCOUNTER — APPOINTMENT (OUTPATIENT)
Dept: LAB | Facility: HOSPITAL | Age: 53
End: 2023-06-22
Payer: COMMERCIAL

## 2023-06-22 DIAGNOSIS — R60.0 EDEMA OF BOTH LOWER EXTREMITIES: Primary | ICD-10-CM

## 2023-06-22 LAB
ANION GAP SERPL CALCULATED.3IONS-SCNC: 6 MMOL/L
BUN SERPL-MCNC: 17 MG/DL (ref 5–25)
CALCIUM SERPL-MCNC: 8.6 MG/DL (ref 8.4–10.2)
CHLORIDE SERPL-SCNC: 108 MMOL/L (ref 96–108)
CO2 SERPL-SCNC: 24 MMOL/L (ref 21–32)
CREAT SERPL-MCNC: 1.07 MG/DL (ref 0.6–1.3)
GFR SERPL CREATININE-BSD FRML MDRD: 58 ML/MIN/1.73SQ M
GLUCOSE P FAST SERPL-MCNC: 95 MG/DL (ref 65–99)
POTASSIUM SERPL-SCNC: 4.1 MMOL/L (ref 3.5–5.3)
SODIUM SERPL-SCNC: 138 MMOL/L (ref 135–147)

## 2023-06-22 PROCEDURE — 80048 BASIC METABOLIC PNL TOTAL CA: CPT | Performed by: NURSE PRACTITIONER

## 2023-06-22 PROCEDURE — 36415 COLL VENOUS BLD VENIPUNCTURE: CPT | Performed by: NURSE PRACTITIONER

## 2023-06-22 RX ORDER — POTASSIUM CHLORIDE 750 MG/1
20 CAPSULE, EXTENDED RELEASE ORAL DAILY
Qty: 180 CAPSULE | Refills: 1 | Status: SHIPPED | OUTPATIENT
Start: 2023-06-22

## 2023-06-22 RX ORDER — FUROSEMIDE 40 MG/1
40 TABLET ORAL DAILY
Qty: 90 TABLET | Refills: 1 | Status: SHIPPED | OUTPATIENT
Start: 2023-06-22

## 2023-06-29 ENCOUNTER — APPOINTMENT (OUTPATIENT)
Dept: LAB | Facility: HOSPITAL | Age: 53
End: 2023-06-29
Payer: COMMERCIAL

## 2023-07-03 DIAGNOSIS — N18.2 STAGE 2 CHRONIC KIDNEY DISEASE: Primary | ICD-10-CM

## 2023-07-06 ENCOUNTER — APPOINTMENT (OUTPATIENT)
Dept: LAB | Facility: HOSPITAL | Age: 53
End: 2023-07-06
Payer: COMMERCIAL

## 2023-07-07 ENCOUNTER — TELEPHONE (OUTPATIENT)
Dept: NEPHROLOGY | Facility: CLINIC | Age: 53
End: 2023-07-07

## 2023-07-07 DIAGNOSIS — N18.2 STAGE 2 CHRONIC KIDNEY DISEASE: Primary | ICD-10-CM

## 2023-07-07 NOTE — TELEPHONE ENCOUNTER
Tried reaching patient, no answer. Left message on voicemail for patient to  Have urine studies done for upcoming appt.

## 2023-07-10 ENCOUNTER — APPOINTMENT (OUTPATIENT)
Dept: LAB | Facility: CLINIC | Age: 53
End: 2023-07-10
Payer: COMMERCIAL

## 2023-07-10 LAB
BACTERIA UR QL AUTO: NORMAL /HPF
BILIRUB UR QL STRIP: NEGATIVE
CLARITY UR: CLEAR
COLOR UR: COLORLESS
CREAT UR-MCNC: 28.4 MG/DL
CREAT UR-MCNC: 28.4 MG/DL
GLUCOSE UR STRIP-MCNC: NEGATIVE MG/DL
HGB UR QL STRIP.AUTO: NEGATIVE
KETONES UR STRIP-MCNC: NEGATIVE MG/DL
LEUKOCYTE ESTERASE UR QL STRIP: NEGATIVE
MICROALBUMIN UR-MCNC: 6 MG/L (ref 0–20)
MICROALBUMIN/CREAT 24H UR: 21 MG/G CREATININE (ref 0–30)
NITRITE UR QL STRIP: NEGATIVE
NON-SQ EPI CELLS URNS QL MICRO: NORMAL /HPF
PH UR STRIP.AUTO: 6 [PH]
PROT UR STRIP-MCNC: NEGATIVE MG/DL
PROT UR-MCNC: <6 MG/DL
RBC #/AREA URNS AUTO: NORMAL /HPF
SP GR UR STRIP.AUTO: 1.01 (ref 1–1.03)
UROBILINOGEN UR STRIP-ACNC: <2 MG/DL
WBC #/AREA URNS AUTO: NORMAL /HPF

## 2023-07-10 PROCEDURE — 82043 UR ALBUMIN QUANTITATIVE: CPT

## 2023-07-10 PROCEDURE — 84156 ASSAY OF PROTEIN URINE: CPT

## 2023-07-10 PROCEDURE — 81001 URINALYSIS AUTO W/SCOPE: CPT

## 2023-07-10 PROCEDURE — 82570 ASSAY OF URINE CREATININE: CPT

## 2023-07-13 ENCOUNTER — APPOINTMENT (OUTPATIENT)
Dept: LAB | Facility: HOSPITAL | Age: 53
End: 2023-07-13
Payer: COMMERCIAL

## 2023-07-17 DIAGNOSIS — E78.2 MIXED HYPERLIPIDEMIA: ICD-10-CM

## 2023-07-17 DIAGNOSIS — I10 ESSENTIAL HYPERTENSION: Primary | ICD-10-CM

## 2023-07-17 DIAGNOSIS — N18.2 STAGE 2 CHRONIC KIDNEY DISEASE: ICD-10-CM

## 2023-07-18 ENCOUNTER — CONSULT (OUTPATIENT)
Dept: NEPHROLOGY | Facility: CLINIC | Age: 53
End: 2023-07-18
Payer: COMMERCIAL

## 2023-07-18 VITALS
OXYGEN SATURATION: 98 % | DIASTOLIC BLOOD PRESSURE: 76 MMHG | HEART RATE: 101 BPM | BODY MASS INDEX: 33.13 KG/M2 | SYSTOLIC BLOOD PRESSURE: 120 MMHG | WEIGHT: 187 LBS | HEIGHT: 63 IN

## 2023-07-18 DIAGNOSIS — N18.2 STAGE 2 CHRONIC KIDNEY DISEASE: ICD-10-CM

## 2023-07-18 DIAGNOSIS — N18.31 STAGE 3A CHRONIC KIDNEY DISEASE (HCC): ICD-10-CM

## 2023-07-18 DIAGNOSIS — N17.9 AKI (ACUTE KIDNEY INJURY) (HCC): Primary | ICD-10-CM

## 2023-07-18 DIAGNOSIS — R60.0 EDEMA OF BOTH LOWER EXTREMITIES: ICD-10-CM

## 2023-07-18 DIAGNOSIS — N02.8 IGA NEPHROPATHY: ICD-10-CM

## 2023-07-18 PROCEDURE — 99204 OFFICE O/P NEW MOD 45 MIN: CPT | Performed by: INTERNAL MEDICINE

## 2023-07-18 RX ORDER — NYSTATIN 100000 [USP'U]/G
POWDER TOPICAL 2 TIMES DAILY
COMMUNITY
Start: 2023-06-20 | End: 2024-06-19

## 2023-07-18 NOTE — ASSESSMENT & PLAN NOTE
Lab Results   Component Value Date    EGFR 45 07/13/2023    EGFR 40 07/06/2023    EGFR 50 06/29/2023    CREATININE 1.31 (H) 07/13/2023    CREATININE 1.45 (H) 07/06/2023    CREATININE 1.20 06/29/2023     Patient appears to have a baseline creatinine around 1.1-1.2 mg/dL. She is approaching this baseline. Etiology of chronic kidney disease most likely due to IgA nephropathy.

## 2023-07-18 NOTE — ASSESSMENT & PLAN NOTE
Patient remains in mild acute kidney injury but appears to be improving. Unclear if this was due to reduced effective circulation with inadequate response due to vasomotor dysfunction, versus other underlying issue which at this point remains unknown. Thankfully, the patient appears to be improving with supportive care and optimization of underlying medical issues. She may continue with both lisinopril and furosemide at this time. We discussed use of furosemide further, please refer below. For completeness sake, we will check a kidney ultrasound to rule out anatomic issues.

## 2023-07-18 NOTE — PROGRESS NOTES
[facility] [address] [place] [address]    Name: Verner Chew  YOB: 1970      Assessment/Plan:    MARK (acute kidney injury) [facility])  Patient remains in mild acute kidney injury but appears to be improving. Unclear if this was due to reduced effective circulation with inadequate response due to vasomotor dysfunction, versus other underlying issue which at this point remains unknown. Thankfully, the patient appears to be improving with supportive care and optimization of underlying medical issues. She may continue with both lisinopril and furosemide at this time. We discussed use of furosemide further, please refer below. For completeness sake, we will check a kidney ultrasound to rule out anatomic issues. Stage 3a chronic kidney disease [facility])  Lab Results   Component Value Date    EGFR 45 07/13/2023    EGFR 40 07/06/2023    EGFR 50 06/29/2023    CREATININE 1.31 (H) 07/13/2023    CREATININE 1.45 (H) 07/06/2023    CREATININE 1.20 06/29/2023     Patient appears to have a baseline creatinine around 1.1-1.2 mg/dL. She is approaching this baseline. Etiology of chronic kidney disease most likely due to IgA nephropathy. IgA nephropathy  Biopsy-proven, continue with lisinopril for supportive care. Edema of both lower extremities  Continue with furosemide daily for now. Patient will continue to work towards reducing sodium intake in her diet. Agree with taking potassium supplementation with furosemide use. Problem List Items Addressed This Visit        Genitourinary    IgA nephropathy     Biopsy-proven, continue with lisinopril for supportive care.          Stage 3a chronic kidney disease [facility])     Lab Results   Component Value Date    EGFR 45 07/13/2023    EGFR 40 07/06/2023    EGFR 50 06/29/2023    CREATININE 1.31 (H) 07/13/2023    CREATININE 1.45 (H) 07/06/2023    CREATININE 1.20 06/29/2023     Patient appears to have a baseline creatinine around 1.1-1.2 mg/dL. She is approaching this baseline. Etiology of chronic kidney disease most likely due to IgA nephropathy. Relevant Orders    Magnesium    Phosphorus    PTH, intact    Comprehensive metabolic panel    US kidney and bladder    MARK (acute kidney injury) ([address]) - Primary     Patient remains in mild acute kidney injury but appears to be improving. Unclear if this was due to reduced effective circulation with inadequate response due to vasomotor dysfunction, versus other underlying issue which at this point remains unknown. Thankfully, the patient appears to be improving with supportive care and optimization of underlying medical issues. She may continue with both lisinopril and furosemide at this time. We discussed use of furosemide further, please refer below. For completeness sake, we will check a kidney ultrasound to rule out anatomic issues. Relevant Orders    US kidney and bladder       Other    Edema of both lower extremities     Continue with furosemide daily for now. Patient will continue to work towards reducing sodium intake in her diet. Agree with taking potassium supplementation with furosemide use. Thank you for allowing us to participate in the care of your patient. We will check labs above next week given that she has regular labs scheduled for her oncologist.  Additional medical intervention will be pursued depending on results. We will see the patient back in 2 months to reevaluate clinically as well as review labs. Subjective:      Patient ID: Verner Chew is a 52 y.o. female. Patient presents for initial evaluation. We reviewed the patient's labs in detail, most recent creatinine, which is slightly improved from previous check is 1.31 mg/dL, which places estimated GFR 45 mL/min. There were no significant electrolyte abnormalities noted. Urine studies noted no significant proteinuria nor hematuria.   She is taking all medications as prescribed with no specific side effects at this time. Hypertension  This is a chronic problem. The current episode started more than 1 year ago. The problem is unchanged. The problem is controlled. Pertinent negatives include no chest pain, orthopnea or peripheral edema. There are no associated agents to hypertension. Risk factors for coronary artery disease include post-menopausal state. Past treatments include lifestyle changes, ACE inhibitors and diuretics. There are no compliance problems. Hypertensive end-organ damage includes kidney disease. Identifiable causes of hypertension include chronic renal disease. The following portions of the patient's history were reviewed and updated as appropriate: allergies, current medications, past family history, past medical history, past social history, past surgical history and problem list.    Review of Systems   Cardiovascular: Negative for chest pain and orthopnea. All other systems reviewed and are negative.         Social History     Socioeconomic History   • Marital status:      Spouse name: None   • Number of children: None   • Years of education: None   • Highest education level: None   Occupational History   • Occupation: EMPLOYED   Tobacco Use   • Smoking status: Never   • Smokeless tobacco: Never   Vaping Use   • Vaping Use: Never used   Substance and Sexual Activity   • Alcohol use: Yes     Comment: socially   • Drug use: No   • Sexual activity: Not Currently     Partners: Male   Other Topics Concern   • None   Social History Narrative    EMPLOYED         Social Determinants of Health     Financial Resource Strain: Not on file   Food Insecurity: Not on file   Transportation Needs: Not on file   Physical Activity: Not on file   Stress: Not on file   Social Connections: Not on file   Intimate Partner Violence: Not on file   Housing Stability: Not on file     Past Medical History:   Diagnosis Date   • Allergic rhinitis    • IgA nephropathy      Past Surgical History:   Procedure Laterality Date   • BREAST BIOPSY Left     l breast   • ENDOMETRIAL BIOPSY     • IR BIOPSY KIDNEY MASS  12/19/2018   • MASTECTOMY Bilateral 07/25/2019       Current Outpatient Medications:   •  aflibercept (Eylea) 2 MG/0.05ML SOLN, by Intravitreal route, Disp: , Rfl:   •  anastrozole (ARIMIDEX) 1 mg tablet, , Disp: , Rfl:   •  Calcium Carbonate-Vit D-Min (CALCIUM 1200 PO), Take by mouth daily, Disp: , Rfl:   •  Cholecalciferol (Vitamin D3) 50 MCG (2000 UT) TABS, Take 2,000 Units by mouth daily, Disp: , Rfl:   •  denosumab (PROLIA) 60 mg/mL, Inject 60 mg under the skin once 1/6/23 first inj,, Disp: , Rfl:   •  furosemide (LASIX) 40 mg tablet, Take 1 tablet (40 mg total) by mouth daily, Disp: 90 tablet, Rfl: 1  •  levocetirizine (XYZAL) 5 MG tablet, Take 5 mg by mouth every evening, Disp: , Rfl:   •  lisinopril (ZESTRIL) 10 mg tablet, Take 1 tablet (10 mg total) by mouth daily, Disp: 90 tablet, Rfl: 1  •  loratadine (CLARITIN) 10 mg tablet, Take 10 mg by mouth daily, Disp: , Rfl:   •  Multiple Vitamin (multivitamin) tablet, Take 1 tablet by mouth daily, Disp: , Rfl:   •  nystatin (MYCOSTATIN) powder, Apply topically 2 (two) times a day, Disp: , Rfl:   •  phentermine 37.5 MG capsule, Take 1 capsule (37.5 mg total) by mouth every morning, Disp: 90 capsule, Rfl: 1  •  potassium chloride (MICRO-K) 10 MEQ CR capsule, Take 2 capsules (20 mEq total) by mouth daily, Disp: 180 capsule, Rfl: 1  •  simvastatin (ZOCOR) 40 mg tablet, Take 1 tablet (40 mg total) by mouth daily at bedtime, Disp: 90 tablet, Rfl: 1  •  topiramate (TOPAMAX) 25 mg sprinkle capsule, Take 1 capsule (25 mg total) by mouth daily, Disp: 90 capsule, Rfl: 1  •  apixaban (ELIQUIS) 5 mg, Take 2 tablets (10 mg total) by mouth 2 (two) times a day for 7 days, THEN 1 tablet (5 mg total) 2 (two) times a day for 23 days. , Disp: 74 tablet, Rfl: 0    Lab Results   Component Value Date    SODIUM 139 07/13/2023    K 4.1 07/13/2023     07/13/2023    CO2 23 07/13/2023    AGAP 10 07/13/2023    BUN 30 (H) 07/13/2023    CREATININE 1.31 (H) 07/13/2023    GLUC 88 03/18/2021    GLUF 101 (H) 07/13/2023    CALCIUM 9.7 07/13/2023    AST 21 06/14/2023    ALT 18 06/14/2023    ALKPHOS 56 06/14/2023    TP 7.5 06/14/2023    TBILI 0.47 06/14/2023    EGFR 45 07/13/2023     Lab Results   Component Value Date    WBC 6.60 06/14/2023    HGB 14.9 06/14/2023    HCT 46.5 (H) 06/14/2023    MCV 89 06/14/2023     06/14/2023     Lab Results   Component Value Date    CHOLESTEROL 161 06/14/2023    CHOLESTEROL 149 02/08/2021    CHOLESTEROL 129 07/17/2020     Lab Results   Component Value Date    HDL 53 06/14/2023    HDL 67 02/08/2021    HDL 40 07/17/2020     Lab Results   Component Value Date    LDLCALC 74 06/14/2023    LDLCALC 66 02/08/2021    LDLCALC 64 07/17/2020     Lab Results   Component Value Date    TRIG 170 (H) 06/14/2023    TRIG 82 02/08/2021    TRIG 125 07/17/2020     No results found for: "CHOLHDL"  Lab Results   Component Value Date    LGR3DHTMDXVO 2.340 03/18/2021     Lab Results   Component Value Date    CALCIUM 9.7 07/13/2023     Lab Results   Component Value Date    SPEP  09/28/2018     The serum total protein and albumin are within normal limits. No monoclonal bands noted. Reviewed by:  Jonathan Burton MD  ([id number]) **Electronic Signature**    UPEP  09/28/2018     The urine total protein is increased. The urine protein electrophoresis shows non-selective proteinuria. No monoclonal bands noted. Reviewed by: Jonathan Burton MD ([id number])  *Electronic Signature**     No results found for: "Rina Todd", "[id number]"        Objective:      /76 (BP Location: Left arm, Patient Position: Sitting, Cuff Size: Standard)   Pulse 101   Ht 5' 3" (1.6 m)   Wt 84.8 kg (187 lb)   LMP 07/27/2019   SpO2 98%   BMI 33.13 kg/m²          Physical Exam  Vitals reviewed. Constitutional:       General: She is not in acute distress. Appearance: She is well-developed. HENT:      Head: Normocephalic and atraumatic. Eyes:      Conjunctiva/sclera: Conjunctivae normal.   Cardiovascular:      Rate and Rhythm: Normal rate and regular rhythm. Pulmonary:      Effort: Pulmonary effort is normal.      Breath sounds: Normal breath sounds. Abdominal:      Palpations: Abdomen is soft. Musculoskeletal:      Cervical back: Neck supple. Skin:     General: Skin is warm. Findings: No rash. Neurological:      Mental Status: She is alert and oriented to person, place, and time. Cranial Nerves: No cranial nerve deficit.    Psychiatric:         Behavior: Behavior normal.

## 2023-07-18 NOTE — ASSESSMENT & PLAN NOTE
Continue with furosemide daily for now. Patient will continue to work towards reducing sodium intake in her diet. Agree with taking potassium supplementation with furosemide use.

## 2023-07-19 ENCOUNTER — TELEPHONE (OUTPATIENT)
Dept: INTERNAL MEDICINE CLINIC | Facility: CLINIC | Age: 53
End: 2023-07-19

## 2023-07-19 NOTE — TELEPHONE ENCOUNTER
Janine Lombard called and is asking what she can take for her headache, with all her medications and different issues going on, she just wants to make sure she doesn't mess anything up.

## 2023-07-20 NOTE — TELEPHONE ENCOUNTER
Extra strength tylenol. Maybe even an excedrine migraine. If neither of them work, I can order her something stronger.

## 2023-07-24 ENCOUNTER — HOSPITAL ENCOUNTER (OUTPATIENT)
Dept: ULTRASOUND IMAGING | Facility: HOSPITAL | Age: 53
Discharge: HOME | End: 2023-07-24
Attending: INTERNAL MEDICINE
Payer: COMMERCIAL

## 2023-07-24 ENCOUNTER — APPOINTMENT (OUTPATIENT)
Dept: LAB | Facility: HOSPITAL | Age: 53
End: 2023-07-24
Payer: COMMERCIAL

## 2023-07-24 DIAGNOSIS — N17.9 AKI (ACUTE KIDNEY INJURY) (HCC): ICD-10-CM

## 2023-07-24 DIAGNOSIS — N18.31 STAGE 3A CHRONIC KIDNEY DISEASE (HCC): ICD-10-CM

## 2023-07-24 DIAGNOSIS — M81.6: ICD-10-CM

## 2023-07-24 LAB
ALBUMIN SERPL BCP-MCNC: 4.4 G/DL (ref 3.5–5)
ALP SERPL-CCNC: 54 U/L (ref 34–104)
ALT SERPL W P-5'-P-CCNC: 15 U/L (ref 7–52)
ANION GAP SERPL CALCULATED.3IONS-SCNC: 9 MMOL/L
AST SERPL W P-5'-P-CCNC: 19 U/L (ref 13–39)
BILIRUB SERPL-MCNC: 0.64 MG/DL (ref 0.2–1)
BUN SERPL-MCNC: 24 MG/DL (ref 5–25)
CALCIUM SERPL-MCNC: 9.3 MG/DL (ref 8.4–10.2)
CHLORIDE SERPL-SCNC: 106 MMOL/L (ref 96–108)
CO2 SERPL-SCNC: 25 MMOL/L (ref 21–32)
CREAT SERPL-MCNC: 1.36 MG/DL (ref 0.6–1.3)
GFR SERPL CREATININE-BSD FRML MDRD: 43 ML/MIN/1.73SQ M
GLUCOSE P FAST SERPL-MCNC: 92 MG/DL (ref 65–99)
MAGNESIUM SERPL-MCNC: 2.1 MG/DL (ref 1.9–2.7)
PHOSPHATE SERPL-MCNC: 3 MG/DL (ref 2.7–4.5)
POTASSIUM SERPL-SCNC: 4.1 MMOL/L (ref 3.5–5.3)
PROT SERPL-MCNC: 7.6 G/DL (ref 6.4–8.4)
PTH-INTACT SERPL-MCNC: 87.4 PG/ML (ref 12–88)
SODIUM SERPL-SCNC: 140 MMOL/L (ref 135–147)

## 2023-07-24 PROCEDURE — 76775 US EXAM ABDO BACK WALL LIM: CPT

## 2023-07-24 PROCEDURE — 84100 ASSAY OF PHOSPHORUS: CPT

## 2023-07-24 PROCEDURE — 83970 ASSAY OF PARATHORMONE: CPT

## 2023-07-24 PROCEDURE — 36415 COLL VENOUS BLD VENIPUNCTURE: CPT

## 2023-07-24 PROCEDURE — 80053 COMPREHEN METABOLIC PANEL: CPT

## 2023-07-24 PROCEDURE — 83735 ASSAY OF MAGNESIUM: CPT

## 2023-08-16 DIAGNOSIS — N18.31 STAGE 3A CHRONIC KIDNEY DISEASE (HCC): ICD-10-CM

## 2023-08-16 DIAGNOSIS — I10 ESSENTIAL HYPERTENSION: Primary | ICD-10-CM

## 2023-08-16 DIAGNOSIS — E78.2 MIXED HYPERLIPIDEMIA: ICD-10-CM

## 2023-08-16 DIAGNOSIS — Z13.29 SCREENING FOR THYROID DISORDER: ICD-10-CM

## 2023-08-16 DIAGNOSIS — Z13.1 SCREENING FOR DIABETES MELLITUS: ICD-10-CM

## 2023-08-16 DIAGNOSIS — D50.8 IRON DEFICIENCY ANEMIA SECONDARY TO INADEQUATE DIETARY IRON INTAKE: ICD-10-CM

## 2023-08-16 DIAGNOSIS — E55.9 VITAMIN D DEFICIENCY: ICD-10-CM

## 2023-08-16 PROBLEM — Z13.220 SCREENING FOR HYPERLIPIDEMIA: Status: ACTIVE | Noted: 2023-08-16

## 2023-08-17 ENCOUNTER — APPOINTMENT (OUTPATIENT)
Dept: LAB | Facility: HOSPITAL | Age: 53
End: 2023-08-17
Payer: COMMERCIAL

## 2023-08-17 DIAGNOSIS — D50.8 IRON DEFICIENCY ANEMIA SECONDARY TO INADEQUATE DIETARY IRON INTAKE: ICD-10-CM

## 2023-08-17 DIAGNOSIS — N18.2 STAGE 2 CHRONIC KIDNEY DISEASE: ICD-10-CM

## 2023-08-17 DIAGNOSIS — Z13.1 SCREENING FOR DIABETES MELLITUS: ICD-10-CM

## 2023-08-17 DIAGNOSIS — E55.9 VITAMIN D DEFICIENCY: ICD-10-CM

## 2023-08-17 DIAGNOSIS — I10 ESSENTIAL HYPERTENSION: ICD-10-CM

## 2023-08-17 DIAGNOSIS — Z13.29 SCREENING FOR THYROID DISORDER: ICD-10-CM

## 2023-08-17 DIAGNOSIS — E78.2 MIXED HYPERLIPIDEMIA: ICD-10-CM

## 2023-08-17 LAB
25(OH)D3 SERPL-MCNC: 77.1 NG/ML (ref 30–100)
ALBUMIN SERPL BCP-MCNC: 4.2 G/DL (ref 3.5–5)
ALP SERPL-CCNC: 54 U/L (ref 34–104)
ALT SERPL W P-5'-P-CCNC: 17 U/L (ref 7–52)
ANION GAP SERPL CALCULATED.3IONS-SCNC: 10 MMOL/L
AST SERPL W P-5'-P-CCNC: 18 U/L (ref 13–39)
BASOPHILS # BLD AUTO: 0.07 THOUSANDS/ÂΜL (ref 0–0.1)
BASOPHILS NFR BLD AUTO: 1 % (ref 0–1)
BILIRUB SERPL-MCNC: 0.4 MG/DL (ref 0.2–1)
BUN SERPL-MCNC: 27 MG/DL (ref 5–25)
CALCIUM SERPL-MCNC: 9.5 MG/DL (ref 8.4–10.2)
CHLORIDE SERPL-SCNC: 106 MMOL/L (ref 96–108)
CHOLEST SERPL-MCNC: 163 MG/DL
CO2 SERPL-SCNC: 24 MMOL/L (ref 21–32)
CREAT SERPL-MCNC: 1.23 MG/DL (ref 0.6–1.3)
EOSINOPHIL # BLD AUTO: 0.19 THOUSAND/ÂΜL (ref 0–0.61)
EOSINOPHIL NFR BLD AUTO: 3 % (ref 0–6)
ERYTHROCYTE [DISTWIDTH] IN BLOOD BY AUTOMATED COUNT: 12.5 % (ref 11.6–15.1)
EST. AVERAGE GLUCOSE BLD GHB EST-MCNC: 126 MG/DL
FERRITIN SERPL-MCNC: 52 NG/ML (ref 11–307)
FOLATE SERPL-MCNC: >22.3 NG/ML
GFR SERPL CREATININE-BSD FRML MDRD: 49 ML/MIN/1.73SQ M
GLUCOSE P FAST SERPL-MCNC: 100 MG/DL (ref 65–99)
HBA1C MFR BLD: 6 %
HCT VFR BLD AUTO: 42.3 % (ref 34.8–46.1)
HDLC SERPL-MCNC: 49 MG/DL
HGB BLD-MCNC: 13.6 G/DL (ref 11.5–15.4)
IMM GRANULOCYTES # BLD AUTO: 0.02 THOUSAND/UL (ref 0–0.2)
IMM GRANULOCYTES NFR BLD AUTO: 0 % (ref 0–2)
IRON SATN MFR SERPL: 23 % (ref 15–50)
IRON SERPL-MCNC: 65 UG/DL (ref 50–170)
LDLC SERPL CALC-MCNC: 88 MG/DL (ref 0–100)
LYMPHOCYTES # BLD AUTO: 2.03 THOUSANDS/ÂΜL (ref 0.6–4.47)
LYMPHOCYTES NFR BLD AUTO: 27 % (ref 14–44)
MAGNESIUM SERPL-MCNC: 2.1 MG/DL (ref 1.9–2.7)
MCH RBC QN AUTO: 28.9 PG (ref 26.8–34.3)
MCHC RBC AUTO-ENTMCNC: 32.2 G/DL (ref 31.4–37.4)
MCV RBC AUTO: 90 FL (ref 82–98)
MONOCYTES # BLD AUTO: 0.54 THOUSAND/ÂΜL (ref 0.17–1.22)
MONOCYTES NFR BLD AUTO: 7 % (ref 4–12)
NEUTROPHILS # BLD AUTO: 4.78 THOUSANDS/ÂΜL (ref 1.85–7.62)
NEUTS SEG NFR BLD AUTO: 62 % (ref 43–75)
NONHDLC SERPL-MCNC: 114 MG/DL
NRBC BLD AUTO-RTO: 0 /100 WBCS
PHOSPHATE SERPL-MCNC: 3.8 MG/DL (ref 2.7–4.5)
PLATELET # BLD AUTO: 220 THOUSANDS/UL (ref 149–390)
PMV BLD AUTO: 9.5 FL (ref 8.9–12.7)
POTASSIUM SERPL-SCNC: 4.2 MMOL/L (ref 3.5–5.3)
PROT SERPL-MCNC: 7.1 G/DL (ref 6.4–8.4)
PTH-INTACT SERPL-MCNC: 63.8 PG/ML (ref 12–88)
RBC # BLD AUTO: 4.71 MILLION/UL (ref 3.81–5.12)
RETICS # AUTO: NORMAL 10*3/UL (ref 14097–95744)
RETICS # CALC: 1.42 % (ref 0.37–1.87)
SODIUM SERPL-SCNC: 140 MMOL/L (ref 135–147)
T4 FREE SERPL-MCNC: 0.85 NG/DL (ref 0.61–1.12)
TIBC SERPL-MCNC: 288 UG/DL (ref 250–450)
TRIGL SERPL-MCNC: 128 MG/DL
TSH SERPL DL<=0.05 MIU/L-ACNC: 2.46 UIU/ML (ref 0.45–4.5)
VIT B12 SERPL-MCNC: 624 PG/ML (ref 180–914)
WBC # BLD AUTO: 7.63 THOUSAND/UL (ref 4.31–10.16)

## 2023-08-17 PROCEDURE — 80061 LIPID PANEL: CPT

## 2023-08-17 PROCEDURE — 36415 COLL VENOUS BLD VENIPUNCTURE: CPT

## 2023-08-17 PROCEDURE — 84443 ASSAY THYROID STIM HORMONE: CPT

## 2023-08-17 PROCEDURE — 80053 COMPREHEN METABOLIC PANEL: CPT

## 2023-08-17 PROCEDURE — 83036 HEMOGLOBIN GLYCOSYLATED A1C: CPT

## 2023-08-17 PROCEDURE — 84100 ASSAY OF PHOSPHORUS: CPT

## 2023-08-17 PROCEDURE — 82728 ASSAY OF FERRITIN: CPT

## 2023-08-17 PROCEDURE — 83970 ASSAY OF PARATHORMONE: CPT

## 2023-08-17 PROCEDURE — 85045 AUTOMATED RETICULOCYTE COUNT: CPT

## 2023-08-17 PROCEDURE — 83540 ASSAY OF IRON: CPT

## 2023-08-17 PROCEDURE — 82306 VITAMIN D 25 HYDROXY: CPT

## 2023-08-17 PROCEDURE — 82746 ASSAY OF FOLIC ACID SERUM: CPT

## 2023-08-17 PROCEDURE — 83550 IRON BINDING TEST: CPT

## 2023-08-17 PROCEDURE — 85025 COMPLETE CBC W/AUTO DIFF WBC: CPT

## 2023-08-17 PROCEDURE — 83735 ASSAY OF MAGNESIUM: CPT

## 2023-08-17 PROCEDURE — 84439 ASSAY OF FREE THYROXINE: CPT

## 2023-08-17 PROCEDURE — 82607 VITAMIN B-12: CPT

## 2023-09-08 DIAGNOSIS — I10 ESSENTIAL HYPERTENSION: ICD-10-CM

## 2023-09-08 DIAGNOSIS — R60.0 EDEMA OF BOTH LOWER EXTREMITIES: ICD-10-CM

## 2023-09-08 RX ORDER — LISINOPRIL 10 MG/1
10 TABLET ORAL DAILY
Qty: 90 TABLET | Refills: 0 | Status: SHIPPED | OUTPATIENT
Start: 2023-09-08

## 2023-09-08 RX ORDER — FUROSEMIDE 40 MG/1
40 TABLET ORAL DAILY
Qty: 90 TABLET | Refills: 0 | Status: SHIPPED | OUTPATIENT
Start: 2023-09-08

## 2023-09-19 ENCOUNTER — OFFICE VISIT (OUTPATIENT)
Dept: NEPHROLOGY | Facility: CLINIC | Age: 53
End: 2023-09-19
Payer: COMMERCIAL

## 2023-09-19 VITALS
BODY MASS INDEX: 32.96 KG/M2 | SYSTOLIC BLOOD PRESSURE: 118 MMHG | HEIGHT: 63 IN | HEART RATE: 94 BPM | DIASTOLIC BLOOD PRESSURE: 80 MMHG | WEIGHT: 186 LBS | OXYGEN SATURATION: 98 %

## 2023-09-19 DIAGNOSIS — N18.31 STAGE 3A CHRONIC KIDNEY DISEASE (HCC): Primary | ICD-10-CM

## 2023-09-19 DIAGNOSIS — I10 ESSENTIAL HYPERTENSION: ICD-10-CM

## 2023-09-19 DIAGNOSIS — N02.B9 IGA NEPHROPATHY: ICD-10-CM

## 2023-09-19 DIAGNOSIS — R60.0 EDEMA OF BOTH LOWER EXTREMITIES: ICD-10-CM

## 2023-09-19 PROBLEM — N17.9 AKI (ACUTE KIDNEY INJURY) (HCC): Status: RESOLVED | Noted: 2023-07-18 | Resolved: 2023-09-19

## 2023-09-19 PROCEDURE — 99204 OFFICE O/P NEW MOD 45 MIN: CPT | Performed by: INTERNAL MEDICINE

## 2023-09-19 NOTE — PROGRESS NOTES
[facility] [address] [place] [address]    Name: Johnnie Sanchez  YOB: 1970      Assessment/Plan:    Stage 3a chronic kidney disease [facility])  Lab Results   Component Value Date    EGFR 49 08/17/2023    EGFR 43 07/24/2023    EGFR 45 07/13/2023    CREATININE 1.23 08/17/2023    CREATININE 1.36 (H) 07/24/2023    CREATININE 1.31 (H) 07/13/2023     Kidney function stable and doing well at this time. Continue with current care and avoidance of potential nephrotoxins. IgA nephropathy  Continue supportive care and management, patient's glomerulonephropathy appears to be stable at this time. Essential hypertension  Blood pressure slightly reduced, but otherwise okay. If the patient becomes symptomatic we will need to reduce the dosing of lisinopril. Edema of both lower extremities  Continue to encourage low-sodium diet, patient is taking furosemide on a daily basis at this time. She is also experiencing right greater than left upper extremity edema, potentially related to lymph node removal with previous surgery on the right side. Will be seen her surgeon in the near future. BMI 32.0-32.9,adult  Patient is trying to reduce caloric intake in her diet, consider the use of a GLP-1 agonist either by mouth or subcutaneous injection to assist given that she has impaired fasting glucose. Problem List Items Addressed This Visit        Cardiovascular and Mediastinum    Essential hypertension     Blood pressure slightly reduced, but otherwise okay. If the patient becomes symptomatic we will need to reduce the dosing of lisinopril. Genitourinary    IgA nephropathy     Continue supportive care and management, patient's glomerulonephropathy appears to be stable at this time.          Stage 3a chronic kidney disease [facility]) - Primary     Lab Results   Component Value Date    EGFR 49 08/17/2023    EGFR 43 07/24/2023    EGFR 45 07/13/2023    CREATININE 1.23 08/17/2023    CREATININE 1.36 (H) 07/24/2023    CREATININE 1.31 (H) 07/13/2023     Kidney function stable and doing well at this time. Continue with current care and avoidance of potential nephrotoxins. Relevant Orders    Comprehensive metabolic panel    Albumin / creatinine urine ratio    Urinalysis with microscopic    Magnesium    Phosphorus    PTH, intact       Other    Edema of both lower extremities     Continue to encourage low-sodium diet, patient is taking furosemide on a daily basis at this time. She is also experiencing right greater than left upper extremity edema, potentially related to lymph node removal with previous surgery on the right side. Will be seen her surgeon in the near future. BMI 32.0-32.9,adult     Patient is trying to reduce caloric intake in her diet, consider the use of a GLP-1 agonist either by mouth or subcutaneous injection to assist given that she has impaired fasting glucose. Patient is stable from a renal standpoint, we will see her back for regular appointment in 1 year. Check labs in 6 months. Subjective:      Patient ID: Phillip Mcgrath is a 52 y.o. female. Patient presents for follow-up appointment. Labs reviewed in detail, most recent creatinine is very good at 1.23 mg/dL, places estimated GFR 49 mL/min with no significant electrode abnormalities noted. She is taking all medications as prescribed with no specific side effects at this time. She is avoiding all nonsteroid and the remainder medications. Hypertension  This is a chronic problem. The current episode started more than 1 year ago. The problem is unchanged. The problem is controlled. Pertinent negatives include no chest pain, orthopnea or peripheral edema. There are no associated agents to hypertension. Risk factors for coronary artery disease include post-menopausal state. Past treatments include lifestyle changes and ACE inhibitors.  There are no compliance problems. Hypertensive end-organ damage includes kidney disease. Identifiable causes of hypertension include chronic renal disease. The following portions of the patient's history were reviewed and updated as appropriate: allergies, current medications, past family history, past medical history, past social history, past surgical history and problem list.    Review of Systems   Cardiovascular: Negative for chest pain and orthopnea. All other systems reviewed and are negative.         Social History     Socioeconomic History   • Marital status:      Spouse name: None   • Number of children: None   • Years of education: None   • Highest education level: None   Occupational History   • Occupation: EMPLOYED   Tobacco Use   • Smoking status: Never   • Smokeless tobacco: Never   Vaping Use   • Vaping Use: Never used   Substance and Sexual Activity   • Alcohol use: Yes     Comment: socially   • Drug use: No   • Sexual activity: Not Currently     Partners: Male   Other Topics Concern   • None   Social History Narrative    EMPLOYED         Social Determinants of Health     Financial Resource Strain: Not on file   Food Insecurity: Not on file   Transportation Needs: Not on file   Physical Activity: Not on file   Stress: Not on file   Social Connections: Not on file   Intimate Partner Violence: Not on file   Housing Stability: Not on file     Past Medical History:   Diagnosis Date   • MARK (acute kidney injury) ([address]) 7/18/2023   • Allergic rhinitis    • IgA nephropathy      Past Surgical History:   Procedure Laterality Date   • BREAST BIOPSY Left     l breast   • ENDOMETRIAL BIOPSY     • IR BIOPSY KIDNEY MASS  12/19/2018   • MASTECTOMY Bilateral 07/25/2019       Current Outpatient Medications:   •  aflibercept (Eylea) 2 MG/0.05ML SOLN, by Intravitreal route, Disp: , Rfl:   •  anastrozole (ARIMIDEX) 1 mg tablet, , Disp: , Rfl:   •  apixaban (ELIQUIS) 5 mg, Take 2 tablets (10 mg total) by mouth 2 (two) times a day for 7 days, THEN 1 tablet (5 mg total) 2 (two) times a day for 23 days. , Disp: 74 tablet, Rfl: 0  •  Calcium Carbonate-Vit D-Min (CALCIUM 1200 PO), Take by mouth daily, Disp: , Rfl:   •  Cholecalciferol (Vitamin D3) 50 MCG (2000 UT) TABS, Take 2,000 Units by mouth daily, Disp: , Rfl:   •  denosumab (PROLIA) 60 mg/mL, Inject 60 mg under the skin once 1/6/23 first inj,, Disp: , Rfl:   •  furosemide (LASIX) 40 mg tablet, TAKE 1 TABLET DAILY. , Disp: 90 tablet, Rfl: 0  •  lisinopril (ZESTRIL) 10 mg tablet, TAKE ONE TABLET DAILY. .., Disp: 90 tablet, Rfl: 0  •  nystatin (MYCOSTATIN) powder, Apply topically 2 (two) times a day, Disp: , Rfl:   •  potassium chloride (MICRO-K) 10 MEQ CR capsule, Take 2 capsules (20 mEq total) by mouth daily, Disp: 180 capsule, Rfl: 1  •  simvastatin (ZOCOR) 40 mg tablet, Take 1 tablet (40 mg total) by mouth daily at bedtime, Disp: 90 tablet, Rfl: 1    Lab Results   Component Value Date    SODIUM 140 08/17/2023    K 4.2 08/17/2023     08/17/2023    CO2 24 08/17/2023    AGAP 10 08/17/2023    BUN 27 (H) 08/17/2023    CREATININE 1.23 08/17/2023    GLUC 88 03/18/2021    GLUF 100 (H) 08/17/2023    CALCIUM 9.5 08/17/2023    AST 18 08/17/2023    ALT 17 08/17/2023    ALKPHOS 54 08/17/2023    TP 7.1 08/17/2023    TBILI 0.40 08/17/2023    EGFR 49 08/17/2023     Lab Results   Component Value Date    WBC 7.63 08/17/2023    HGB 13.6 08/17/2023    HCT 42.3 08/17/2023    MCV 90 08/17/2023     08/17/2023     Lab Results   Component Value Date    CHOLESTEROL 163 08/17/2023    CHOLESTEROL 161 06/14/2023    CHOLESTEROL 149 02/08/2021     Lab Results   Component Value Date    HDL 49 (L) 08/17/2023    HDL 53 06/14/2023    HDL 67 02/08/2021     Lab Results   Component Value Date    LDLCALC 88 08/17/2023    LDLCALC 74 06/14/2023    LDLCALC 66 02/08/2021     Lab Results   Component Value Date    TRIG 128 08/17/2023    TRIG 170 (H) 06/14/2023    TRIG 82 02/08/2021     No results found for: "CHOLHDL"  Lab Results   Component Value Date    FVV3FLVCMLMB 2.458 08/17/2023     Lab Results   Component Value Date    PTH 63.8 08/17/2023    CALCIUM 9.5 08/17/2023    PHOS 3.8 08/17/2023     Lab Results   Component Value Date    SPEP  09/28/2018     The serum total protein and albumin are within normal limits. No monoclonal bands noted. Reviewed by:  Grace Porter MD  ([id number]) **Electronic Signature**    UPEP  09/28/2018     The urine total protein is increased. The urine protein electrophoresis shows non-selective proteinuria. No monoclonal bands noted. Reviewed by: Grace Porter MD ([id number])  *Electronic Signature**     No results found for: "Lucille Hurley", "[id number]"        Objective:      /80 Comment: left arm  Pulse 94   Ht 5' 3" (1.6 m)   Wt 84.4 kg (186 lb)   LMP 07/27/2019   SpO2 98%   BMI 32.95 kg/m²          Physical Exam  Vitals reviewed. Constitutional:       General: She is not in acute distress. Appearance: She is well-developed. HENT:      Head: Normocephalic and atraumatic. Eyes:      Conjunctiva/sclera: Conjunctivae normal.   Cardiovascular:      Rate and Rhythm: Normal rate and regular rhythm. Pulmonary:      Effort: Pulmonary effort is normal.      Breath sounds: Normal breath sounds. Abdominal:      Palpations: Abdomen is soft. Musculoskeletal:      Cervical back: Neck supple. Comments: Mild bilateral lower extremity edema with mild lymphedema   Skin:     General: Skin is warm. Findings: No rash. Neurological:      Mental Status: She is alert and oriented to person, place, and time. Cranial Nerves: No cranial nerve deficit.    Psychiatric:         Behavior: Behavior normal.

## 2023-09-20 NOTE — ASSESSMENT & PLAN NOTE
Blood pressure slightly reduced, but otherwise okay. If the patient becomes symptomatic we will need to reduce the dosing of lisinopril.

## 2023-09-20 NOTE — ASSESSMENT & PLAN NOTE
Continue supportive care and management, patient's glomerulonephropathy appears to be stable at this time.

## 2023-09-20 NOTE — ASSESSMENT & PLAN NOTE
Patient is trying to reduce caloric intake in her diet, consider the use of a GLP-1 agonist either by mouth or subcutaneous injection to assist given that she has impaired fasting glucose.

## 2023-09-20 NOTE — ASSESSMENT & PLAN NOTE
Continue to encourage low-sodium diet, patient is taking furosemide on a daily basis at this time. She is also experiencing right greater than left upper extremity edema, potentially related to lymph node removal with previous surgery on the right side. Will be seen her surgeon in the near future.

## 2023-09-20 NOTE — ASSESSMENT & PLAN NOTE
Lab Results   Component Value Date    EGFR 49 08/17/2023    EGFR 43 07/24/2023    EGFR 45 07/13/2023    CREATININE 1.23 08/17/2023    CREATININE 1.36 (H) 07/24/2023    CREATININE 1.31 (H) 07/13/2023     Kidney function stable and doing well at this time. Continue with current care and avoidance of potential nephrotoxins.

## 2023-09-28 ENCOUNTER — RA CDI HCC (OUTPATIENT)
Dept: OTHER | Facility: HOSPITAL | Age: 53
End: 2023-09-28

## 2023-10-03 PROBLEM — Z78.0 ASYMPTOMATIC POSTMENOPAUSAL STATE: Status: ACTIVE | Noted: 2023-10-03

## 2023-10-03 PROBLEM — Z11.59 NEED FOR HEPATITIS C SCREENING TEST: Status: ACTIVE | Noted: 2023-10-03

## 2023-10-03 PROBLEM — Z11.4 SCREENING FOR HIV (HUMAN IMMUNODEFICIENCY VIRUS): Status: ACTIVE | Noted: 2023-10-03

## 2023-10-03 PROBLEM — R73.03 PRE-DIABETES: Status: ACTIVE | Noted: 2023-08-16

## 2023-10-03 NOTE — PROGRESS NOTES
Assessment/Plan:     Problem List Items Addressed This Visit        Cardiovascular and Mediastinum    Essential hypertension - Primary       Genitourinary    IgA nephropathy    Stage 3a chronic kidney disease (HCC)       Other    History of pulmonary embolism    Mixed hyperlipidemia    Edema of both lower extremities     Has lasix prn         Vitamin D deficiency    Pre-diabetes    Iron deficiency anemia secondary to inadequate dietary iron intake    BMI 32.0-32.9,adult    Asymptomatic postmenopausal state    Relevant Orders    DXA bone density spine hip and pelvis    Need for hepatitis C screening test    Relevant Orders    Hepatitis C antibody    Screening for HIV (human immunodeficiency virus)    Relevant Orders    HIV 1/2 AB/AG w Reflex [facility] for 2 yr old and above       Subjective:      Patient ID: Heriberto Anand is a 52 y.o. female. The patient is here today to discuss her medication and treatment plans. Please continue to the [facility] section of the note for details of today's visit. The following portions of the patient's history were reviewed and updated as appropriate:     Past Medical History:  She has a past medical history of MARK (acute kidney injury) ([address]) (7/18/2023), Allergic rhinitis, and IgA nephropathy. ,  _______________________________________________________________________  Medical Problems:  does not have any pertinent problems on file.,  _______________________________________________________________________  Past Surgical History:   has a past surgical history that includes Breast biopsy (Left); IR biopsy kidney mass (12/19/2018); Mastectomy (Bilateral, 07/25/2019); and Endometrial biopsy. ,  _______________________________________________________________________  Family History:  family history includes Breast cancer in her mother; Hernia in her father.,  _______________________________________________________________________  Social History:   reports that she has never smoked. She has never used smokeless tobacco. She reports current alcohol use. She reports that she does not use drugs. ,  _______________________________________________________________________  Allergies:  is allergic to bactrim [sulfamethoxazole-trimethoprim] and sulfasalazine. .  _______________________________________________________________________  Current Outpatient Medications   Medication Sig Dispense Refill   • aflibercept (Eylea) 2 MG/0.05ML SOLN by Intravitreal route     • anastrozole (ARIMIDEX) 1 mg tablet      • apixaban (ELIQUIS) 5 mg Take 2 tablets (10 mg total) by mouth 2 (two) times a day for 7 days, THEN 1 tablet (5 mg total) 2 (two) times a day for 23 days. 74 tablet 0   • Calcium Carbonate-Vit D-Min (CALCIUM 1200 PO) Take by mouth daily     • Cholecalciferol (Vitamin D3) 50 MCG (2000 UT) TABS Take 2,000 Units by mouth daily     • denosumab (PROLIA) 60 mg/mL Inject 60 mg under the skin once 1/6/23 first inj,     • lisinopril (ZESTRIL) 10 mg tablet TAKE ONE TABLET DAILY. .. 90 tablet 0   • nystatin (MYCOSTATIN) powder Apply topically 2 (two) times a day     • simvastatin (ZOCOR) 40 mg tablet Take 1 tablet (40 mg total) by mouth daily at bedtime 90 tablet 1     No current facility-administered medications for this visit.     _______________________________________________________________________      Review of Systems   Constitutional: Negative for activity change, chills, fatigue and fever. HENT: Negative for rhinorrhea and sore throat. Eyes: Negative for pain. Respiratory: Negative for cough and shortness of breath. Cardiovascular: Negative for chest pain, palpitations and leg swelling. Gastrointestinal: Negative for abdominal pain, constipation, diarrhea, nausea and vomiting. Genitourinary: Negative for difficulty urinating, flank pain, frequency and urgency. Musculoskeletal: Negative for gait problem, joint swelling and myalgias. Skin: Negative for color change.    Neurological: Negative for dizziness, weakness, light-headedness and headaches. Psychiatric/Behavioral: Negative for sleep disturbance. The patient is not nervous/anxious. All other systems reviewed and are negative. Objective:  Vitals:    10/05/23 0805   BP: 118/74   Pulse: 73   Temp: 97.8 °F (36.6 °C)   SpO2: 98%   Weight: 85 kg (187 lb 6.4 oz)   Height: 5' 3" (1.6 m)     Body mass index is 33.2 kg/m². Physical Exam  Vitals reviewed. Constitutional:       General: She is awake. Appearance: Normal appearance. She is well-developed and normal weight. HENT:      Head: Normocephalic and atraumatic. Nose: Nose normal.      Mouth/Throat:      Mouth: Mucous membranes are moist.   Eyes:      Extraocular Movements: Extraocular movements intact. Cardiovascular:      Rate and Rhythm: Normal rate and regular rhythm. Pulses: Normal pulses. Heart sounds: Normal heart sounds. Pulmonary:      Effort: Pulmonary effort is normal.      Breath sounds: Normal breath sounds. Abdominal:      General: Bowel sounds are normal.      Palpations: Abdomen is soft. Musculoskeletal:         General: Normal range of motion. Cervical back: Normal range of motion. Right lower leg: No edema. Left lower leg: No edema. Skin:     General: Skin is warm and dry. Neurological:      Mental Status: She is alert and oriented to person, place, and time. Psychiatric:         Attention and Perception: Attention normal.         Mood and Affect: Mood normal.         Speech: Speech normal.         Behavior: Behavior normal. Behavior is cooperative.

## 2023-10-05 ENCOUNTER — OFFICE VISIT (OUTPATIENT)
Dept: INTERNAL MEDICINE CLINIC | Facility: CLINIC | Age: 53
End: 2023-10-05
Payer: COMMERCIAL

## 2023-10-05 VITALS
TEMPERATURE: 97.8 F | SYSTOLIC BLOOD PRESSURE: 118 MMHG | BODY MASS INDEX: 33.2 KG/M2 | DIASTOLIC BLOOD PRESSURE: 74 MMHG | WEIGHT: 187.4 LBS | OXYGEN SATURATION: 98 % | HEART RATE: 73 BPM | HEIGHT: 63 IN

## 2023-10-05 DIAGNOSIS — N02.B9 IGA NEPHROPATHY: ICD-10-CM

## 2023-10-05 DIAGNOSIS — R73.03 PRE-DIABETES: ICD-10-CM

## 2023-10-05 DIAGNOSIS — Z11.4 SCREENING FOR HIV (HUMAN IMMUNODEFICIENCY VIRUS): ICD-10-CM

## 2023-10-05 DIAGNOSIS — D50.8 IRON DEFICIENCY ANEMIA SECONDARY TO INADEQUATE DIETARY IRON INTAKE: ICD-10-CM

## 2023-10-05 DIAGNOSIS — R60.0 EDEMA OF BOTH LOWER EXTREMITIES: ICD-10-CM

## 2023-10-05 DIAGNOSIS — E55.9 VITAMIN D DEFICIENCY: ICD-10-CM

## 2023-10-05 DIAGNOSIS — Z11.59 NEED FOR HEPATITIS C SCREENING TEST: ICD-10-CM

## 2023-10-05 DIAGNOSIS — I10 ESSENTIAL HYPERTENSION: Primary | ICD-10-CM

## 2023-10-05 DIAGNOSIS — E78.2 MIXED HYPERLIPIDEMIA: ICD-10-CM

## 2023-10-05 DIAGNOSIS — N18.31 STAGE 3A CHRONIC KIDNEY DISEASE (HCC): ICD-10-CM

## 2023-10-05 DIAGNOSIS — Z78.0 ASYMPTOMATIC POSTMENOPAUSAL STATE: ICD-10-CM

## 2023-10-05 DIAGNOSIS — Z86.711 HISTORY OF PULMONARY EMBOLISM: ICD-10-CM

## 2023-10-05 PROCEDURE — 99214 OFFICE O/P EST MOD 30 MIN: CPT | Performed by: NURSE PRACTITIONER

## 2023-10-05 NOTE — PATIENT INSTRUCTIONS
Problem List Items Addressed This Visit          Cardiovascular and Mediastinum    Essential hypertension - Primary       Genitourinary    IgA nephropathy    Stage 3a chronic kidney disease (HCC)       Other    History of pulmonary embolism    Mixed hyperlipidemia    Edema of both lower extremities     Has lasix prn         Vitamin D deficiency    Pre-diabetes    Iron deficiency anemia secondary to inadequate dietary iron intake    BMI 32.0-32.9,adult    Asymptomatic postmenopausal state    Relevant Orders    DXA bone density spine hip and pelvis    Need for hepatitis C screening test    Relevant Orders    Hepatitis C antibody    Screening for HIV (human immunodeficiency virus)    Relevant Orders    HIV 1/2 AB/AG w Reflex [facility] for 2 yr old and above

## 2023-10-15 PROBLEM — Z13.29 SCREENING FOR THYROID DISORDER: Status: RESOLVED | Noted: 2023-08-16 | Resolved: 2023-10-15

## 2023-10-21 PROBLEM — Z28.21 REFUSED INFLUENZA VACCINE: Status: ACTIVE | Noted: 2023-10-21

## 2023-12-02 PROBLEM — Z11.59 NEED FOR HEPATITIS C SCREENING TEST: Status: RESOLVED | Noted: 2023-10-03 | Resolved: 2023-12-02

## 2024-02-15 ENCOUNTER — RA CDI HCC (OUTPATIENT)
Dept: OTHER | Facility: HOSPITAL | Age: 54
End: 2024-02-15

## 2024-02-15 PROBLEM — Z11.4 SCREENING FOR HIV (HUMAN IMMUNODEFICIENCY VIRUS): Status: RESOLVED | Noted: 2023-10-03 | Resolved: 2024-02-15

## 2024-02-15 PROBLEM — Z01.818 PREOP EXAMINATION: Status: RESOLVED | Noted: 2023-06-01 | Resolved: 2024-02-15

## 2024-02-15 PROBLEM — Z00.00 ANNUAL PHYSICAL EXAM: Status: RESOLVED | Noted: 2021-10-07 | Resolved: 2024-02-15

## 2024-02-19 PROBLEM — C50.411 MALIGNANT NEOPLASM OF UPPER-OUTER QUADRANT OF RIGHT BREAST IN FEMALE, ESTROGEN RECEPTOR POSITIVE: Status: ACTIVE | Noted: 2019-04-03

## 2024-02-19 PROBLEM — T45.1X5A HOT FLASHES RELATED TO AROMATASE INHIBITOR THERAPY: Status: ACTIVE | Noted: 2021-02-26

## 2024-02-19 PROBLEM — Z17.0 MALIGNANT NEOPLASM OF UPPER-OUTER QUADRANT OF RIGHT BREAST IN FEMALE, ESTROGEN RECEPTOR POSITIVE: Status: ACTIVE | Noted: 2019-04-03

## 2024-02-19 PROBLEM — M79.604 RIGHT LEG PAIN: Status: RESOLVED | Noted: 2019-09-16 | Resolved: 2024-02-19

## 2024-02-19 PROBLEM — J30.2 SEASONAL ALLERGIES: Status: ACTIVE | Noted: 2017-06-10

## 2024-02-19 PROBLEM — R07.9 CHEST PAIN: Status: RESOLVED | Noted: 2021-03-18 | Resolved: 2024-02-19

## 2024-02-19 PROBLEM — M81.6 LOCALIZED OSTEOPOROSIS WITHOUT CURRENT PATHOLOGICAL FRACTURE: Status: ACTIVE | Noted: 2022-12-14

## 2024-02-19 PROBLEM — Z23 ENCOUNTER FOR IMMUNIZATION: Status: ACTIVE | Noted: 2024-02-19

## 2024-02-19 PROBLEM — Z78.0 ASYMPTOMATIC POSTMENOPAUSAL STATE: Status: RESOLVED | Noted: 2023-10-03 | Resolved: 2024-02-19

## 2024-02-19 PROBLEM — R23.2 HOT FLASHES RELATED TO AROMATASE INHIBITOR THERAPY: Status: ACTIVE | Noted: 2021-02-26

## 2024-02-19 PROBLEM — Z90.13 H/O BILATERAL MASTECTOMY: Status: ACTIVE | Noted: 2020-06-19

## 2024-02-19 NOTE — PATIENT INSTRUCTIONS
Wellness Visit for Adults   AMBULATORY CARE:   A wellness visit  is when you see your healthcare provider to get screened for health problems. Your healthcare provider will also give you advice on how to stay healthy. Write down your questions so you remember to ask them. Ask your healthcare provider how often you should have a wellness visit.  What happens at a wellness visit:  Your healthcare provider will ask about your health, and your family history of health problems. This includes high blood pressure, heart disease, and cancer. He or she will ask if you have symptoms that concern you, if you smoke, and about your mood. You may also be asked about your intake of medicines, supplements, food, and alcohol. Any of the following may be done:  Your weight  will be checked. Your height may also be checked so your body mass index (BMI) can be calculated. Your BMI shows if you are at a healthy weight.    Your blood pressure  and heart rate will be checked. Your temperature may also be checked.    Blood and urine tests  may be done. Blood tests may be done to check your cholesterol levels. Abnormal cholesterol levels increase your risk for heart disease and stroke. You may also need a blood or urine test to check for diabetes if you are at increased risk. Urine tests may be done to look for signs of an infection or kidney disease.    A physical exam  includes checking your heartbeat and lungs with a stethoscope. Your healthcare provider may also check your skin to look for sun damage.    Screening tests  may be recommended. A screening test is done to check for diseases that may not cause symptoms. The screening tests you may need depend on your age, gender, family history, and lifestyle habits. For example, colorectal screening may be recommended if you are 50 years old or older.    Screening tests you need if you are a woman:   A Pap smear  is used to screen for cervical cancer. Pap smears are usually done every 3 to  5 years depending on your age. You may need them more often if you have had abnormal Pap smear test results in the past. Ask your healthcare provider how often you should have a Pap smear.    A mammogram  is an x-ray of your breasts to screen for breast cancer. Experts recommend mammograms every 2 years starting at age 50 years. You may need a mammogram at age 49 years or younger if you have an increased risk for breast cancer. Talk to your healthcare provider about when you should start having mammograms and how often you need them.    Vaccines you may need:   Get an influenza vaccine  every year. The influenza vaccine protects you from the flu. Several types of viruses cause the flu. The viruses change over time, so new vaccines are made each year.    Get a tetanus-diphtheria (Td) booster vaccine  every 10 years. This vaccine protects you against tetanus and diphtheria. Tetanus is a severe infection that may cause painful muscle spasms and lockjaw. Diphtheria is a severe bacterial infection that causes a thick covering in the back of your mouth and throat.    Get a human papillomavirus (HPV) vaccine  if you are female and aged 19 to 26 or male 19 to 21 and never received it. This vaccine protects you from HPV infection. HPV is the most common infection spread by sexual contact. HPV may also cause vaginal, penile, and anal cancers.    Get a pneumococcal vaccine  if you are aged 65 years or older. The pneumococcal vaccine is an injection given to protect you from pneumococcal disease. Pneumococcal disease is an infection caused by pneumococcal bacteria. The infection may cause pneumonia, meningitis, or an ear infection.    Get a shingles vaccine  if you are 60 or older, even if you have had shingles before. The shingles vaccine is an injection to protect you from the varicella-zoster virus. This is the same virus that causes chickenpox. Shingles is a painful rash that develops in people who had chickenpox or have  been exposed to the virus.    How to eat healthy:  My Plate is a model for planning healthy meals. It shows the types and amounts of foods that should go on your plate. Fruits and vegetables make up about half of your plate, and grains and protein make up the other half. A serving of dairy is included on the side of your plate. The amount of calories and serving sizes you need depends on your age, gender, weight, and height. Examples of healthy foods are listed below:  Eat a variety of vegetables  such as dark green, red, and orange vegetables. You can also include canned vegetables low in sodium (salt) and frozen vegetables without added butter or sauces.    Eat a variety of fresh fruits , canned fruit in 100% juice, frozen fruit, and dried fruit.    Include whole grains.  At least half of the grains you eat should be whole grains. Examples include whole-wheat bread, wheat pasta, brown rice, and whole-grain cereals such as oatmeal.    Eat a variety of protein foods such as seafood (fish and shellfish), lean meat, and poultry without skin (turkey and chicken). Examples of lean meats include pork leg, shoulder, or tenderloin, and beef round, sirloin, tenderloin, and extra lean ground beef. Other protein foods include eggs and egg substitutes, beans, peas, soy products, nuts, and seeds.    Choose low-fat dairy products such as skim or 1% milk or low-fat yogurt, cheese, and cottage cheese.    Limit unhealthy fats  such as butter, hard margarine, and shortening.       Exercise:  Exercise at least 30 minutes per day on most days of the week. Some examples of exercise include walking, biking, dancing, and swimming. You can also fit in more physical activity by taking the stairs instead of the elevator or parking farther away from stores. Include muscle strengthening activities 2 days each week. Regular exercise provides many health benefits. It helps you manage your weight, and decreases your risk for type 2 diabetes,  heart disease, stroke, and high blood pressure. Exercise can also help improve your mood. Ask your healthcare provider about the best exercise plan for you.       General health and safety guidelines:   Do not smoke.  Nicotine and other chemicals in cigarettes and cigars can cause lung damage. Ask your healthcare provider for information if you currently smoke and need help to quit. E-cigarettes or smokeless tobacco still contain nicotine. Talk to your healthcare provider before you use these products.    Limit alcohol.  A drink of alcohol is 12 ounces of beer, 5 ounces of wine, or 1½ ounces of liquor.    Lose weight, if needed.  Being overweight increases your risk of certain health conditions. These include heart disease, high blood pressure, type 2 diabetes, and certain types of cancer.    Protect your skin.  Do not sunbathe or use tanning beds. Use sunscreen with a SPF 15 or higher. Apply sunscreen at least 15 minutes before you go outside. Reapply sunscreen every 2 hours. Wear protective clothing, hats, and sunglasses when you are outside.    Drive safely.  Always wear your seatbelt. Make sure everyone in your car wears a seatbelt. A seatbelt can save your life if you are in an accident. Do not use your cell phone when you are driving. This could distract you and cause an accident. Pull over if you need to make a call or send a text message.    Practice safe sex.  Use latex condoms if are sexually active and have more than one partner. Your healthcare provider may recommend screening tests for sexually transmitted infections (STIs).    Wear helmets, lifejackets, and protective gear.  Always wear a helmet when you ride a bike or motorcycle, go skiing, or play sports that could cause a head injury. Wear protective equipment when you play sports. Wear a lifejacket when you are on a boat or doing water sports.    © Copyright [employer] 2023 Information is for End User's use only and may not be sold, redistributed or  Render Post-Care Instructions In Note?: no otherwise used for commercial purposes.  The above information is an  only. It is not intended as medical advice for individual conditions or treatments. Talk to your doctor, nurse or pharmacist before following any medical regimen to see if it is safe and effective for you.     Consent: The patient's consent was obtained including but not limited to risks of crusting, scabbing, blistering, scarring, darker or lighter pigmentary change, recurrence, incomplete removal and infection. Detail Level: Detailed Duration Of Freeze Thaw-Cycle (Seconds): 0 Post-Care Instructions: I reviewed with the patient in detail post-care instructions. Patient is to wear sunprotection, and avoid picking at any of the treated lesions. Pt may apply Vaseline to crusted or scabbing areas.

## 2024-02-19 NOTE — ASSESSMENT & PLAN NOTE
Lab Results   Component Value Date    EGFR 49 08/17/2023    EGFR 43 07/24/2023    EGFR 45 07/13/2023    CREATININE 1.23 08/17/2023    CREATININE 1.36 (H) 07/24/2023    CREATININE 1.31 (H) 07/13/2023     Follows with nephrology

## 2024-02-19 NOTE — PROGRESS NOTES
ADULT ANNUAL PHYSICAL  [facility]    NAME: Elise Reyes  AGE: 53 y.o. SEX: female  : 1970     DATE: 2024     Assessment and Plan:     Problem List Items Addressed This Visit          Cardiovascular and Mediastinum    Essential hypertension     Continue lisinopril          Relevant Medications    lisinopril (ZESTRIL) 10 mg tablet    Other Relevant Orders    Comprehensive metabolic panel    Hot flashes related to aromatase inhibitor therapy    Venous insufficiency     Continue eliquis             Musculoskeletal and Integument    Localized osteoporosis without current pathological fracture     DXA scheduled by hematology/oncology   Continue prolia, calcium and vitamin d supplements         Relevant Medications    Calcium Carbonate-Vit D-Min (Calcium 1200) 1353-6983 MG-UNIT CHEW    Skin anomaly     Bilateral forearms red splotches             Genitourinary    Stage 3a chronic kidney disease (HCC)     Lab Results   Component Value Date    EGFR 49 2023    EGFR 43 2023    EGFR 45 2023    CREATININE 1.23 2023    CREATININE 1.36 (H) 2023    CREATININE 1.31 (H) 2023     Follows with nephrology             Other    History of pulmonary embolism     Continue eliquis          Mixed hyperlipidemia     Continue zocor          Relevant Medications    simvastatin (ZOCOR) 40 mg tablet    Other Relevant Orders    Lipid panel    Annual physical exam - Primary     Lifestyle modification, diet and exercise discussed  Medications discussed and refilled appropriately  Labs discussed and ordered   Hepatitis C screening discussed  HIV screening discussed  Depression screening performed  Anxiety screening performed  Urinary Incontinence screening performed   BMI discussed  Osteoporosis screening performed and ordered   Fall screening performed         Relevant Orders    CBC and Platelet    Comprehensive metabolic panel    Edema of  both lower extremities     Improved            Class 1 obesity due to excess calories without serious comorbidity with body mass index (BMI) of 33.0 to 33.9 in adult     Lifestyle modification, diet and exercise discussed         Vitamin D deficiency     Continue supplement  Will check lab          Relevant Medications    Cholecalciferol (Vitamin D3) 50 MCG (2000 UT) TABS    Other Relevant Orders    Vitamin D 25 hydroxy    Iron deficiency anemia secondary to inadequate dietary iron intake     Will check labs          Relevant Orders    Vitamin B12    Iron Panel (Includes Ferritin, Iron Sat%, Iron, and TIBC)    Folate    Screening for HIV (human immunodeficiency virus)    Relevant Orders    HIV 1/2 AB/AG w Reflex [facility] for 2 yr old and above    H/O bilateral mastectomy    Malignant neoplasm of upper-outer quadrant of right breast in female, estrogen receptor positive      Arimidex should be finished in August  Then she should be done with Prolia and Eliquis          Encounter for immunization     Patient offered and deferred vaccinations.           RESOLVED: Screening for thyroid disorder    Relevant Orders    TSH, 3rd generation with Free T4 reflex    RESOLVED: Need for hepatitis C screening test    Relevant Orders    Hepatitis C antibody     Other Visit Diagnoses       Need for COVID-19 vaccine        Screening for diabetes mellitus        Relevant Orders    Hemoglobin A1C    Multiple vitamin deficiency        Relevant Orders    PTH, intact    Magnesium    Phosphorus            Immunizations and preventive care screenings were discussed with patient today. Appropriate education was printed on patient's after visit summary.    Counseling:  Alcohol/drug use: discussed moderation in alcohol intake, the recommendations for healthy alcohol use, and avoidance of illicit drug use.  Dental Health: discussed importance of regular tooth brushing, flossing, and dental visits.  Injury prevention: discussed safety/seat belts,  safety helmets, smoke detectors, carbon dioxide detectors, and smoking near bedding or upholstery.  Sexual health: discussed sexually transmitted diseases, partner selection, use of condoms, avoidance of unintended pregnancy, and contraceptive alternatives.  Exercise: the importance of regular exercise/physical activity was discussed. Recommend exercise 3-5 times per week for at least 30 minutes.       Depression Screening and Follow-up Plan: Patient was screened for depression during today's encounter. They screened negative with a PHQ-2 score of 0.        Return in about 6 months (around 8/22/2024).     Chief Complaint:     Chief Complaint   Patient presents with    Follow-up     No questions or concerns      History of Present Illness:     Adult Annual Physical   Patient here for a comprehensive physical exam. The patient reports problems - as discussed  .    Diet and Physical Activity  Diet/Nutrition: well balanced diet.   Exercise: 3-4 times a week on average.      Depression Screening  PHQ-2/9 Depression Screening    Little interest or pleasure in doing things: 0 - not at all  Feeling down, depressed, or hopeless: 0 - not at all  PHQ-2 Score: 0  PHQ-2 Interpretation: Negative depression screen       General Health  Sleep: sleeps well.   Hearing: normal - bilateral.  Vision: no vision problems.   Dental: regular dental visits.       /GYN Health  Follows with gynecology? yes   Patient is: .  Last menstrual period: .  Contraceptive method:  . .    Advanced Care Planning  Do you have an advanced directive? no  Do you have a durable medical power of ? no  ACP document given to the patient? no     Review of Systems:     Review of Systems   Constitutional:  Negative for activity change, chills, fatigue and fever.   HENT:  Negative for rhinorrhea and sore throat.    Eyes:  Negative for pain.   Respiratory:  Negative for cough and shortness of breath.    Cardiovascular:  Negative for chest pain, palpitations  and leg swelling.   Gastrointestinal:  Negative for abdominal pain, constipation, diarrhea, nausea and vomiting.   Genitourinary:  Negative for difficulty urinating, flank pain, frequency and urgency.   Musculoskeletal:  Negative for gait problem, joint swelling and myalgias.   Skin:  Negative for color change.   Neurological:  Negative for dizziness, weakness, light-headedness and headaches.   Psychiatric/Behavioral:  Negative for sleep disturbance. The patient is not nervous/anxious.    All other systems reviewed and are negative.     Past Medical History:     Past Medical History:   Diagnosis Date    MARK (acute kidney injury) ([facility]) 07/18/2023    Allergic     Seasonal allergies and sulfa bactrim    Allergic rhinitis     Annual physical exam     Cancer ([facility]) March 2019    Chronic kidney disease     IGA Nephrapathy    Headache(784.0)     IgA nephropathy     Preop examination     Screening for HIV (human immunodeficiency virus)       Past Surgical History:     Past Surgical History:   Procedure Laterality Date    BREAST BIOPSY Left     l breast    ENDOMETRIAL BIOPSY      IR BIOPSY KIDNEY MASS  12/19/2018    MAMMO NEEDLE LOCALIZATION LEFT (ALL INC) Left 4/17/2012    MASTECTOMY Bilateral 07/25/2019      Social History:     Social History     Socioeconomic History    Marital status:      Spouse name: None    Number of children: None    Years of education: None    Highest education level: None   Occupational History    Occupation: EMPLOYED   Tobacco Use    Smoking status: Never     Passive exposure: Never    Smokeless tobacco: Never   Vaping Use    Vaping status: Never Used   Substance and Sexual Activity    Alcohol use: Yes     Alcohol/week: 2.0 standard drinks of alcohol     Types: 2 Cans of beer per week     Comment: socially    Drug use: No    Sexual activity: Yes     Partners: Male     Birth control/protection: None   Other Topics Concern    None   Social History Narrative    EMPLOYED         Social  "Determinants of Health     Financial Resource Strain: Not on file   Food Insecurity: Not on file   Transportation Needs: Not on file   Physical Activity: Not on file   Stress: Not on file   Social Connections: Not on file   Intimate Partner Violence: Not on file   Housing Stability: Not on file      Family History:     Family History   Problem Relation Age of Onset    Breast cancer Mother     Cancer Mother     Hernia Father     Cancer Maternal Grandfather     Cancer Maternal Grandmother     Cancer Maternal Aunt       Current Medications:     Current Outpatient Medications   Medication Sig Dispense Refill    aflibercept (Eylea) 2 MG/0.05ML SOLN by Intravitreal route      anastrozole (ARIMIDEX) 1 mg tablet       apixaban (ELIQUIS) 5 mg Take 2 tablets (10 mg total) by mouth 2 (two) times a day for 7 days, THEN 1 tablet (5 mg total) 2 (two) times a day for 23 days. 74 tablet 0    Calcium Carbonate-Vit D-Min (Calcium 1200) 0391-7029 MG-UNIT CHEW Chew 1 tablet daily 90 tablet 3    Cholecalciferol (Vitamin D3) 50 MCG (2000 UT) TABS Take 1 tablet (2,000 Units total) by mouth daily 90 tablet 3    denosumab (PROLIA) 60 mg/mL Inject 60 mg under the skin once 1/6/23 first inj,      lisinopril (ZESTRIL) 10 mg tablet Take 1 tablet (10 mg total) by mouth daily 90 tablet 3    simvastatin (ZOCOR) 40 mg tablet Take 1 tablet (40 mg total) by mouth daily at bedtime 90 tablet 3     No current facility-administered medications for this visit.      Allergies:     Allergies   Allergen Reactions    Bactrim [Sulfamethoxazole-Trimethoprim]     Sulfasalazine Rash      Physical Exam:     /82 (BP Location: Left arm, Patient Position: Sitting)   Pulse 67   Temp 98.7 °F (37.1 °C)   Ht 5' 4\" (1.626 m)   Wt 88.9 kg (196 lb)   LMP 07/27/2019   SpO2 98%   BMI 33.64 kg/m²     Physical Exam  Vitals and nursing note reviewed.   Constitutional:       General: She is awake. She is not in acute distress.     Appearance: Normal appearance. She " is well-developed and overweight.   HENT:      Head: Normocephalic and atraumatic.      Nose: Nose normal.      Mouth/Throat:      Mouth: Mucous membranes are moist.   Eyes:      Conjunctiva/sclera: Conjunctivae normal.   Cardiovascular:      Rate and Rhythm: Normal rate and regular rhythm.      Pulses: Normal pulses.      Heart sounds: Normal heart sounds. No murmur heard.  Pulmonary:      Effort: Pulmonary effort is normal. No respiratory distress.      Breath sounds: Normal breath sounds.   Abdominal:      General: Bowel sounds are normal.      Palpations: Abdomen is soft.      Tenderness: There is no abdominal tenderness.   Musculoskeletal:      Cervical back: Neck supple.      Right lower leg: No edema.      Left lower leg: No edema.   Skin:     General: Skin is warm and dry.   Neurological:      Mental Status: She is alert and oriented to person, place, and time.   Psychiatric:         Attention and Perception: Attention normal.         Mood and Affect: Mood normal.         Speech: Speech normal.         Behavior: Behavior normal. Behavior is cooperative.          CASSIDY Begum  [facility]

## 2024-02-19 NOTE — ASSESSMENT & PLAN NOTE
Lifestyle modification, diet and exercise discussed  Medications discussed and refilled appropriately  Labs discussed and ordered   Hepatitis C screening discussed  HIV screening discussed  Depression screening performed  Anxiety screening performed  Urinary Incontinence screening performed   BMI discussed  Osteoporosis screening performed and ordered   Fall screening performed

## 2024-02-21 ENCOUNTER — TELEPHONE (OUTPATIENT)
Age: 54
End: 2024-02-21

## 2024-02-21 PROBLEM — Z13.29 SCREENING FOR THYROID DISORDER: Status: RESOLVED | Noted: 2023-08-16 | Resolved: 2024-02-21

## 2024-02-21 PROBLEM — Z11.59 NEED FOR HEPATITIS C SCREENING TEST: Status: RESOLVED | Noted: 2023-10-03 | Resolved: 2024-02-21

## 2024-02-21 NOTE — TELEPHONE ENCOUNTER
Patient called bc she would like to set up a consult w/ dr matthew.  She had a double mastectomy and breast recon about a year ago at another facility and said her implants are shifting and she would like a second opinion.  Please contact patient, thanks!

## 2024-02-22 ENCOUNTER — OFFICE VISIT (OUTPATIENT)
Dept: INTERNAL MEDICINE CLINIC | Facility: CLINIC | Age: 54
End: 2024-02-22
Payer: COMMERCIAL

## 2024-02-22 VITALS
DIASTOLIC BLOOD PRESSURE: 82 MMHG | TEMPERATURE: 98.7 F | BODY MASS INDEX: 33.46 KG/M2 | OXYGEN SATURATION: 98 % | SYSTOLIC BLOOD PRESSURE: 122 MMHG | HEART RATE: 67 BPM | HEIGHT: 64 IN | WEIGHT: 196 LBS

## 2024-02-22 DIAGNOSIS — E56.9 MULTIPLE VITAMIN DEFICIENCY: ICD-10-CM

## 2024-02-22 DIAGNOSIS — Z23 NEED FOR COVID-19 VACCINE: ICD-10-CM

## 2024-02-22 DIAGNOSIS — D50.8 IRON DEFICIENCY ANEMIA SECONDARY TO INADEQUATE DIETARY IRON INTAKE: ICD-10-CM

## 2024-02-22 DIAGNOSIS — R60.0 EDEMA OF BOTH LOWER EXTREMITIES: ICD-10-CM

## 2024-02-22 DIAGNOSIS — E78.2 MIXED HYPERLIPIDEMIA: ICD-10-CM

## 2024-02-22 DIAGNOSIS — Z86.711 HISTORY OF PULMONARY EMBOLISM: ICD-10-CM

## 2024-02-22 DIAGNOSIS — Z13.29 SCREENING FOR THYROID DISORDER: ICD-10-CM

## 2024-02-22 DIAGNOSIS — M81.6 LOCALIZED OSTEOPOROSIS WITHOUT CURRENT PATHOLOGICAL FRACTURE: ICD-10-CM

## 2024-02-22 DIAGNOSIS — Q82.9 SKIN ANOMALY: ICD-10-CM

## 2024-02-22 DIAGNOSIS — Z11.4 SCREENING FOR HIV (HUMAN IMMUNODEFICIENCY VIRUS): ICD-10-CM

## 2024-02-22 DIAGNOSIS — E66.09 CLASS 1 OBESITY DUE TO EXCESS CALORIES WITHOUT SERIOUS COMORBIDITY WITH BODY MASS INDEX (BMI) OF 33.0 TO 33.9 IN ADULT: ICD-10-CM

## 2024-02-22 DIAGNOSIS — R23.2 HOT FLASHES RELATED TO AROMATASE INHIBITOR THERAPY: ICD-10-CM

## 2024-02-22 DIAGNOSIS — Z00.00 ANNUAL PHYSICAL EXAM: Primary | ICD-10-CM

## 2024-02-22 DIAGNOSIS — T45.1X5A HOT FLASHES RELATED TO AROMATASE INHIBITOR THERAPY: ICD-10-CM

## 2024-02-22 DIAGNOSIS — Z17.0 MALIGNANT NEOPLASM OF UPPER-OUTER QUADRANT OF RIGHT BREAST IN FEMALE, ESTROGEN RECEPTOR POSITIVE: ICD-10-CM

## 2024-02-22 DIAGNOSIS — I87.2 VENOUS INSUFFICIENCY: ICD-10-CM

## 2024-02-22 DIAGNOSIS — N18.31 STAGE 3A CHRONIC KIDNEY DISEASE (HCC): ICD-10-CM

## 2024-02-22 DIAGNOSIS — Z11.59 NEED FOR HEPATITIS C SCREENING TEST: ICD-10-CM

## 2024-02-22 DIAGNOSIS — Z13.1 SCREENING FOR DIABETES MELLITUS: ICD-10-CM

## 2024-02-22 DIAGNOSIS — E55.9 VITAMIN D DEFICIENCY: ICD-10-CM

## 2024-02-22 DIAGNOSIS — C50.411 MALIGNANT NEOPLASM OF UPPER-OUTER QUADRANT OF RIGHT BREAST IN FEMALE, ESTROGEN RECEPTOR POSITIVE: ICD-10-CM

## 2024-02-22 DIAGNOSIS — I10 ESSENTIAL HYPERTENSION: ICD-10-CM

## 2024-02-22 DIAGNOSIS — Z23 ENCOUNTER FOR IMMUNIZATION: ICD-10-CM

## 2024-02-22 DIAGNOSIS — Z90.13 H/O BILATERAL MASTECTOMY: ICD-10-CM

## 2024-02-22 PROCEDURE — 99396 PREV VISIT EST AGE 40-64: CPT | Performed by: NURSE PRACTITIONER

## 2024-02-22 RX ORDER — SIMVASTATIN 40 MG
40 TABLET ORAL
Qty: 90 TABLET | Refills: 3 | Status: SHIPPED | OUTPATIENT
Start: 2024-02-22

## 2024-02-22 RX ORDER — LISINOPRIL 10 MG/1
10 TABLET ORAL DAILY
Qty: 90 TABLET | Refills: 3 | Status: SHIPPED | OUTPATIENT
Start: 2024-02-22

## 2024-02-22 RX ORDER — CHOLECALCIFEROL (VITAMIN D3) 125 MCG
2000 CAPSULE ORAL DAILY
Qty: 90 TABLET | Refills: 3 | Status: SHIPPED | OUTPATIENT
Start: 2024-02-22

## 2024-02-22 RX ORDER — ACETAMINOPHEN 500 MG
1 TABLET ORAL DAILY
Qty: 90 TABLET | Refills: 3 | Status: SHIPPED | OUTPATIENT
Start: 2024-02-22

## 2024-03-04 ENCOUNTER — APPOINTMENT (OUTPATIENT)
Dept: LAB | Facility: HOSPITAL | Age: 54
End: 2024-03-04
Payer: COMMERCIAL

## 2024-03-04 DIAGNOSIS — Z13.1 SCREENING FOR DIABETES MELLITUS: ICD-10-CM

## 2024-03-04 DIAGNOSIS — E78.2 MIXED HYPERLIPIDEMIA: ICD-10-CM

## 2024-03-04 DIAGNOSIS — Z13.29 SCREENING FOR THYROID DISORDER: ICD-10-CM

## 2024-03-04 DIAGNOSIS — I10 ESSENTIAL HYPERTENSION: ICD-10-CM

## 2024-03-04 DIAGNOSIS — E56.9 MULTIPLE VITAMIN DEFICIENCY: ICD-10-CM

## 2024-03-04 DIAGNOSIS — Z11.4 SCREENING FOR HIV (HUMAN IMMUNODEFICIENCY VIRUS): ICD-10-CM

## 2024-03-04 DIAGNOSIS — E55.9 VITAMIN D DEFICIENCY: ICD-10-CM

## 2024-03-04 DIAGNOSIS — Z11.59 NEED FOR HEPATITIS C SCREENING TEST: ICD-10-CM

## 2024-03-04 DIAGNOSIS — N18.31 STAGE 3A CHRONIC KIDNEY DISEASE (HCC): ICD-10-CM

## 2024-03-04 DIAGNOSIS — D50.8 IRON DEFICIENCY ANEMIA SECONDARY TO INADEQUATE DIETARY IRON INTAKE: ICD-10-CM

## 2024-03-04 DIAGNOSIS — M81.6 LOCALIZED OSTEOPOROSIS WITHOUT PATHOLOGICAL FRACTURE: ICD-10-CM

## 2024-03-04 DIAGNOSIS — Z00.00 ANNUAL PHYSICAL EXAM: ICD-10-CM

## 2024-03-04 LAB
25(OH)D3 SERPL-MCNC: 59.6 NG/ML (ref 30–100)
ALBUMIN SERPL BCP-MCNC: 4.3 G/DL (ref 3.5–5)
ALP SERPL-CCNC: 63 U/L (ref 34–104)
ALT SERPL W P-5'-P-CCNC: 17 U/L (ref 7–52)
ANION GAP SERPL CALCULATED.3IONS-SCNC: 11 MMOL/L
AST SERPL W P-5'-P-CCNC: 20 U/L (ref 13–39)
BACTERIA UR QL AUTO: ABNORMAL /HPF
BILIRUB SERPL-MCNC: 0.6 MG/DL (ref 0.2–1)
BILIRUB UR QL STRIP: NEGATIVE
BUN SERPL-MCNC: 20 MG/DL (ref 5–25)
CALCIUM SERPL-MCNC: 9.5 MG/DL (ref 8.4–10.2)
CHLORIDE SERPL-SCNC: 104 MMOL/L (ref 96–108)
CHOLEST SERPL-MCNC: 162 MG/DL
CLARITY UR: CLEAR
CO2 SERPL-SCNC: 25 MMOL/L (ref 21–32)
COLOR UR: YELLOW
CREAT SERPL-MCNC: 1.06 MG/DL (ref 0.6–1.3)
CREAT UR-MCNC: 170.7 MG/DL
ERYTHROCYTE [DISTWIDTH] IN BLOOD BY AUTOMATED COUNT: 13 % (ref 11.6–15.1)
EST. AVERAGE GLUCOSE BLD GHB EST-MCNC: 123 MG/DL
FERRITIN SERPL-MCNC: 37 NG/ML (ref 11–307)
FOLATE SERPL-MCNC: >22.3 NG/ML
GFR SERPL CREATININE-BSD FRML MDRD: 59 ML/MIN/1.73SQ M
GLUCOSE P FAST SERPL-MCNC: 96 MG/DL (ref 65–99)
GLUCOSE UR STRIP-MCNC: NEGATIVE MG/DL
HBA1C MFR BLD: 5.9 %
HCT VFR BLD AUTO: 46.3 % (ref 34.8–46.1)
HDLC SERPL-MCNC: 53 MG/DL
HGB BLD-MCNC: 15 G/DL (ref 11.5–15.4)
HGB UR QL STRIP.AUTO: NEGATIVE
IRON SATN MFR SERPL: 32 % (ref 15–50)
IRON SERPL-MCNC: 112 UG/DL (ref 50–212)
KETONES UR STRIP-MCNC: NEGATIVE MG/DL
LDLC SERPL CALC-MCNC: 78 MG/DL (ref 0–100)
LEUKOCYTE ESTERASE UR QL STRIP: NEGATIVE
MAGNESIUM SERPL-MCNC: 1.9 MG/DL (ref 1.9–2.7)
MCH RBC QN AUTO: 28.4 PG (ref 26.8–34.3)
MCHC RBC AUTO-ENTMCNC: 32.4 G/DL (ref 31.4–37.4)
MCV RBC AUTO: 88 FL (ref 82–98)
MICROALBUMIN UR-MCNC: 54 MG/L
MICROALBUMIN/CREAT 24H UR: 32 MG/G CREATININE (ref 0–30)
MUCOUS THREADS UR QL AUTO: ABNORMAL
NITRITE UR QL STRIP: NEGATIVE
NON-SQ EPI CELLS URNS QL MICRO: ABNORMAL /HPF
NONHDLC SERPL-MCNC: 109 MG/DL
PH UR STRIP.AUTO: 6 [PH]
PHOSPHATE SERPL-MCNC: 3 MG/DL (ref 2.7–4.5)
PLATELET # BLD AUTO: 237 THOUSANDS/UL (ref 149–390)
PMV BLD AUTO: 9.5 FL (ref 8.9–12.7)
POTASSIUM SERPL-SCNC: 4.2 MMOL/L (ref 3.5–5.3)
PROT SERPL-MCNC: 7.1 G/DL (ref 6.4–8.4)
PROT UR STRIP-MCNC: NEGATIVE MG/DL
PTH-INTACT SERPL-MCNC: 39.1 PG/ML (ref 12–88)
RBC # BLD AUTO: 5.28 MILLION/UL (ref 3.81–5.12)
RBC #/AREA URNS AUTO: ABNORMAL /HPF
SODIUM SERPL-SCNC: 140 MMOL/L (ref 135–147)
SP GR UR STRIP.AUTO: 1.02
TIBC SERPL-MCNC: 353 UG/DL (ref 250–450)
TRIGL SERPL-MCNC: 154 MG/DL
TSH SERPL DL<=0.05 MIU/L-ACNC: 2.38 UIU/ML (ref 0.45–4.5)
UIBC SERPL-MCNC: 241 UG/DL (ref 155–355)
UROBILINOGEN UR QL STRIP.AUTO: 0.2 E.U./DL
VIT B12 SERPL-MCNC: 396 PG/ML (ref 180–914)
WBC # BLD AUTO: 6.33 THOUSAND/UL (ref 4.31–10.16)
WBC #/AREA URNS AUTO: ABNORMAL /HPF

## 2024-03-04 PROCEDURE — 82570 ASSAY OF URINE CREATININE: CPT

## 2024-03-04 PROCEDURE — 83540 ASSAY OF IRON: CPT

## 2024-03-04 PROCEDURE — 81001 URINALYSIS AUTO W/SCOPE: CPT

## 2024-03-04 PROCEDURE — 82728 ASSAY OF FERRITIN: CPT

## 2024-03-04 PROCEDURE — 82043 UR ALBUMIN QUANTITATIVE: CPT

## 2024-03-04 PROCEDURE — 83970 ASSAY OF PARATHORMONE: CPT

## 2024-03-04 PROCEDURE — 83036 HEMOGLOBIN GLYCOSYLATED A1C: CPT

## 2024-03-04 PROCEDURE — 80053 COMPREHEN METABOLIC PANEL: CPT

## 2024-03-04 PROCEDURE — 83735 ASSAY OF MAGNESIUM: CPT

## 2024-03-04 PROCEDURE — 83550 IRON BINDING TEST: CPT

## 2024-03-04 PROCEDURE — 80061 LIPID PANEL: CPT

## 2024-03-04 PROCEDURE — 82746 ASSAY OF FOLIC ACID SERUM: CPT

## 2024-03-04 PROCEDURE — 36415 COLL VENOUS BLD VENIPUNCTURE: CPT

## 2024-03-04 PROCEDURE — 84443 ASSAY THYROID STIM HORMONE: CPT

## 2024-03-04 PROCEDURE — 85027 COMPLETE CBC AUTOMATED: CPT

## 2024-03-04 PROCEDURE — 82607 VITAMIN B-12: CPT

## 2024-03-04 PROCEDURE — 82306 VITAMIN D 25 HYDROXY: CPT

## 2024-03-04 PROCEDURE — 84100 ASSAY OF PHOSPHORUS: CPT

## 2024-03-20 ENCOUNTER — CONSULT (OUTPATIENT)
Dept: PLASTIC SURGERY | Facility: CLINIC | Age: 54
End: 2024-03-20
Payer: COMMERCIAL

## 2024-03-20 VITALS
SYSTOLIC BLOOD PRESSURE: 125 MMHG | TEMPERATURE: 98.3 F | WEIGHT: 194.5 LBS | HEART RATE: 74 BPM | HEIGHT: 63 IN | DIASTOLIC BLOOD PRESSURE: 96 MMHG | BODY MASS INDEX: 34.46 KG/M2

## 2024-03-20 DIAGNOSIS — Z17.0 MALIGNANT NEOPLASM OF UPPER-OUTER QUADRANT OF RIGHT BREAST IN FEMALE, ESTROGEN RECEPTOR POSITIVE (HCC): Primary | ICD-10-CM

## 2024-03-20 DIAGNOSIS — C50.411 MALIGNANT NEOPLASM OF UPPER-OUTER QUADRANT OF RIGHT BREAST IN FEMALE, ESTROGEN RECEPTOR POSITIVE (HCC): Primary | ICD-10-CM

## 2024-03-20 PROCEDURE — 99205 OFFICE O/P NEW HI 60 MIN: CPT | Performed by: STUDENT IN AN ORGANIZED HEALTH CARE EDUCATION/TRAINING PROGRAM

## 2024-03-20 NOTE — PROGRESS NOTES
Plastic Surgery Consult    Reason for visit: implant displacement of right breast implant with history of breast reconstruction    HPI from 3/20/24  Patient is a pleasant 54 y/o female who presents with displaced right breast implant. She has significant medical history of right breast cancer in April 2019. She did not have radiation. She then underwent bilateral mastectomy with reconstructio in July 2019. She then underwent exchange for expanders with permanent implants in Oct 2019. She underwent subsequent revision 3/2020 and then suffered pulmonary embolsim in July 2020 for which she was placed on eliquis. In June 2022, She underwent revision of the right breast implant. All of these procedures were performed at [facility]. She is currently unhappy with the malposition of the right breast implant.    Patient states that she has gained 60 lbs since being on arimidex (aromatase inhibitor) and is scheduled to complete therapy this summer. She was instructed to continue eliquis until she has completed her arimidex therapy.    She currently has 535 cc Smooth High Profile Xtra SHPX-535 cc implants.     Of note, she has significant medical history of IgA nephropathy    ROS: 12 pt ROS negative, except as otherwise noted in HPI    Past Medical History:   Diagnosis Date    MARK (acute kidney injury) ([facility]) 07/18/2023    Allergic     Seasonal allergies and sulfa bactrim    Allergic rhinitis     Annual physical exam     Cancer ([facility]) March 2019    Chronic kidney disease     IGA Nephrapathy    Headache(784.0)     IgA nephropathy     Preop examination     Screening for HIV (human immunodeficiency virus)        FamHx: non-contrib  SurgHx: breast surgeries as noted above  SocHx: no tobacco, no smoking. +social ETOH  Meds: +eliquis, no steroids, no aspirin  Allergies: sulfa, bactrim    PE:    Vitals:    03/20/24 1131   BP: 125/96   Pulse: 74   Temp: 98.3 °F (36.8 °C)       General: NC/AT, breathing comfortably on RA  Neuro: CN II-XII  grossly intact, symmetric reflexes  HEENT: PERRLA, EOMI, external ears normal, no lesions or deformities, neck supple, trachea midline  Respiratory: CTAB, normal respiratory effort  Cardio: RRR, normal S1, S2, no murmur, rubs, gallops  GI: soft, non-tender, non-distended  Extremities/MSK: normal alignment, mobility, gait, no edema  Skin: no rashes, lesions, subcutaneous nodules    BMI: 34.5    Bilateral moderate to large sized breasts  Good shape bilaterally; however, the right breast is bottomed out with lower IMF compared to the left  Right breast has two parallel horizontal scars (at IMF and mid-breast).  Left breast has single transverse scar across breast.  Mild excess skin in the lateral breast/chest region  Both breasts soft, non-tender  No capsular contracture      A/P: 52 y/o female with history of right breast cancer s/p bilateral mastectomy and reconstruction. She presents with bottomed out right breast implant that has effaced the right IMF.   -I discussed with patient that this sometimes happens with larger breast implants as they start out in the normal position but overtime due to size, weight, and gravity, the implant can efface the IMF and result in lower position of the breast footprint. The treatment for this would be to place mesh or ADM or other reinforcing suture to recreate the IMF at a higher position to raise the breast footprint. Patient is also dissatisfied with the extra skin in the lateral chest and breast area. I discussed that these can be excised for revision if needed.  -I did caution that the right breast has long parallel transverse scars in the mid breast and IMF and the skin between could be threatened by significant excision of lateral skin involving the blood supply. Patient acknowledged.  -I informed patient that prior to any surgical intervention, it would be worthwhile to reach her goal weight. Patient has been trying to lose weight but with the arimedex has gained 60 lbs.  Her arimedex will be complete by the summer at which time she will likely be able to stop eliquis as well. I stated that there is no rush, as she loses weight, her breast may become more ptotic with increased excess skin in the lateral breast and chest region. I recommended for patient to reach her goal weight and maintain for 3 months and then will re-evaluate to discuss surgical options. Patient acknowledged and agreed.  -Will verify placement of implants submuscular vs subglandular   -Spent 55 minutes in consultation with patient. Greater than 50% of the total time was spent obtaining history, evaluation, performing exam, discussion of management options including post-operative care, answering patient's questions and concerns, chart reviewing, and documentation    Otto Chowdhury MD   [facility]   [address] [place] [address], [address]   [place] [address]   Office: [phone]

## 2024-04-19 PROBLEM — Z11.4 SCREENING FOR HIV (HUMAN IMMUNODEFICIENCY VIRUS): Status: RESOLVED | Noted: 2023-10-03 | Resolved: 2024-04-19

## 2024-05-01 ENCOUNTER — TELEPHONE (OUTPATIENT)
Dept: NEPHROLOGY | Facility: CLINIC | Age: 54
End: 2024-05-01

## 2024-05-01 NOTE — TELEPHONE ENCOUNTER
Patient was at an appointment with her mom, will call back to schedule appointment. She is due for a 1 year follow up in September with Dr. Folyd or she can see CRNP/PA if willing. (Recall list)

## 2024-06-20 ENCOUNTER — HOSPITAL ENCOUNTER (EMERGENCY)
Facility: HOSPITAL | Age: 54
Discharge: HOME | End: 2024-06-20
Attending: EMERGENCY MEDICINE
Payer: COMMERCIAL

## 2024-06-20 VITALS
HEART RATE: 88 BPM | TEMPERATURE: 98.6 F | OXYGEN SATURATION: 95 % | SYSTOLIC BLOOD PRESSURE: 178 MMHG | RESPIRATION RATE: 19 BRPM | DIASTOLIC BLOOD PRESSURE: 89 MMHG

## 2024-06-20 DIAGNOSIS — M54.50 MIDLINE LOW BACK PAIN WITHOUT SCIATICA, UNSPECIFIED CHRONICITY: Primary | ICD-10-CM

## 2024-06-20 PROCEDURE — 99284 EMERGENCY DEPT VISIT MOD MDM: CPT | Performed by: EMERGENCY MEDICINE

## 2024-06-20 PROCEDURE — 99283 EMERGENCY DEPT VISIT LOW MDM: CPT

## 2024-06-20 PROCEDURE — 96372 THER/PROPH/DIAG INJ SC/IM: CPT

## 2024-06-20 RX ORDER — DEXAMETHASONE SODIUM PHOSPHATE 4 MG/ML
4 INJECTION, SOLUTION INTRA-ARTICULAR; INTRALESIONAL; INTRAMUSCULAR; INTRAVENOUS; SOFT TISSUE ONCE
Status: COMPLETED | OUTPATIENT
Start: 2024-06-20 | End: 2024-06-20

## 2024-06-20 RX ORDER — PREDNISONE 20 MG/1
40 TABLET ORAL DAILY
Qty: 8 TABLET | Refills: 0 | Status: SHIPPED | OUTPATIENT
Start: 2024-06-21 | End: 2024-06-25

## 2024-06-20 RX ORDER — MELOXICAM 15 MG/1
15 TABLET ORAL DAILY
Qty: 7 TABLET | Refills: 0 | Status: SHIPPED | OUTPATIENT
Start: 2024-06-20 | End: 2024-06-20 | Stop reason: CLARIF

## 2024-06-20 RX ORDER — LIDOCAINE 50 MG/G
1 PATCH TOPICAL DAILY
Qty: 10 PATCH | Refills: 0 | Status: SHIPPED | OUTPATIENT
Start: 2024-06-20

## 2024-06-20 RX ORDER — CYCLOBENZAPRINE HCL 5 MG
5 TABLET ORAL 3 TIMES DAILY PRN
Qty: 15 TABLET | Refills: 0 | Status: SHIPPED | OUTPATIENT
Start: 2024-06-20

## 2024-06-20 RX ADMIN — DEXAMETHASONE SODIUM PHOSPHATE 4 MG: 4 INJECTION, SOLUTION INTRAMUSCULAR; INTRAVENOUS at 21:26

## 2024-06-21 NOTE — DISCHARGE INSTRUCTIONS
Return to the ER for any new, concerning, or worsening issues.    Take prednisone 40 mg a day for the next 4 days starting tomorrow 6/21/2024.  Take with food.    Use Flexeril 1 pill every 8 hours as needed for spasm.  Do not drive or operate heavy machinery on this medicine.    You may use Lidoderm patches as discussed for local pain control.    If symptoms persist, imaging would be required.  I recommend you talk with your doctor about this next week.    Return for any neurologic issues such as weakness difficulty with bowel or bladder management, or numbness in the groin.  Also if you develop any fever or chills.

## 2024-06-21 NOTE — ED PROVIDER NOTES
"History  Chief Complaint   Patient presents with    Back Pain     Patient reports lower back spasms since beginning of June. Reports she has seen chiropractor with some relief but it keeps coming back.      54-year-old female presents emergency department complaining of lumbosacral back pain that has been ongoing for about 6 days.  The patient denies any trauma fever IV drug use or weight loss recently.  The patient does have a history of breast cancer and currently is on oral chemotherapy as well as Eliquis for multiple pulmonary emboli.  The patient notes that she has seen her chiropractor who feels that this is a structural issue, possibly from a disc however the patient has no radicular radiation of pain into her legs.  Patient denies bowel or bladder continence issues or weakness.  The patient came to the ER because she seems to get \"spasms\" in her back when she turns certain ways.        Prior to Admission Medications   Prescriptions Last Dose Informant Patient Reported? Taking?   Calcium Carbonate-Vit D-Min (Calcium 1200) 6966-5333 MG-UNIT CHEW  Self No No   Sig: Chew 1 tablet daily   Cholecalciferol (Vitamin D3) 50 MCG (2000 UT) TABS  Self No No   Sig: Take 1 tablet (2,000 Units total) by mouth daily   aflibercept (Eylea) 2 MG/0.05ML SOLN  Self Yes No   Sig: by Intravitreal route   anastrozole (ARIMIDEX) 1 mg tablet  Self Yes No   apixaban (ELIQUIS) 5 mg  Self No No   Sig: Take 2 tablets (10 mg total) by mouth 2 (two) times a day for 7 days, THEN 1 tablet (5 mg total) 2 (two) times a day for 23 days.   denosumab (PROLIA) 60 mg/mL  Self Yes No   Sig: Inject 60 mg under the skin once 1/6/23 first inj,   lisinopril (ZESTRIL) 10 mg tablet  Self No No   Sig: Take 1 tablet (10 mg total) by mouth daily   simvastatin (ZOCOR) 40 mg tablet  Self No No   Sig: Take 1 tablet (40 mg total) by mouth daily at bedtime      Facility-Administered Medications: None       Past Medical History:   Diagnosis Date    MARK (acute " kidney injury) ([facility]) 07/18/2023    Allergic     Seasonal allergies and sulfa bactrim    Allergic rhinitis     Annual physical exam     Cancer ([facility]) March 2019    Chronic kidney disease     IGA Nephrapathy    Headache(784.0)     IgA nephropathy     Preop examination     Screening for HIV (human immunodeficiency virus)        Past Surgical History:   Procedure Laterality Date    BREAST BIOPSY Left     l breast    ENDOMETRIAL BIOPSY      IR BIOPSY KIDNEY MASS  12/19/2018    MAMMO NEEDLE LOCALIZATION LEFT (ALL INC) Left 4/17/2012    MASTECTOMY Bilateral 07/25/2019       Family History   Problem Relation Age of Onset    Breast cancer Mother     Cancer Mother     Hernia Father     Cancer Maternal Grandfather     Cancer Maternal Grandmother     Cancer Maternal Aunt     Heart failure Paternal Grandmother      I have reviewed and agree with the history as documented.    E-Cigarette/Vaping    E-Cigarette Use Never User      E-Cigarette/Vaping Substances    Nicotine No     THC No     CBD No     Flavoring No     Other No     Unknown No      Social History     Tobacco Use    Smoking status: Never     Passive exposure: Never    Smokeless tobacco: Never   Vaping Use    Vaping status: Never Used   Substance Use Topics    Alcohol use: Yes     Alcohol/week: 2.0 standard drinks of alcohol     Types: 2 Cans of beer per week     Comment: socially    Drug use: No       Review of Systems   Constitutional:  Negative for fever.   HENT:  Negative for ear pain and sore throat.    Eyes:  Negative for pain and visual disturbance.   Respiratory:  Negative for cough and shortness of breath.    Cardiovascular:  Negative for chest pain and palpitations.   Gastrointestinal:  Negative for abdominal pain and vomiting.   Genitourinary:  Negative for hematuria.   Musculoskeletal:  Positive for arthralgias, back pain and myalgias. Negative for gait problem and joint swelling.   Skin:  Negative for color change and rash.   All other systems reviewed and  are negative.      Physical Exam  Physical Exam  Vitals and nursing note reviewed.   Constitutional:       General: She is not in acute distress.     Appearance: She is well-developed.   HENT:      Head: Normocephalic and atraumatic.      Mouth/Throat:      Mouth: Mucous membranes are moist.   Eyes:      Conjunctiva/sclera: Conjunctivae normal.   Cardiovascular:      Rate and Rhythm: Normal rate and regular rhythm.      Heart sounds: No murmur heard.  Pulmonary:      Effort: Pulmonary effort is normal. No respiratory distress.      Breath sounds: Normal breath sounds.   Abdominal:      Palpations: Abdomen is soft.      Tenderness: There is no abdominal tenderness.   Musculoskeletal:         General: Tenderness present. No swelling, deformity or signs of injury.      Cervical back: Neck supple.      Right lower leg: No edema.      Left lower leg: No edema.   Skin:     General: Skin is warm and dry.      Capillary Refill: Capillary refill takes less than 2 seconds.   Neurological:      General: No focal deficit present.      Mental Status: She is alert and oriented to person, place, and time. Mental status is at baseline.      Cranial Nerves: No cranial nerve deficit.      Sensory: No sensory deficit.      Motor: No weakness.      Gait: Gait normal.   Psychiatric:         Mood and Affect: Mood normal.         Vital Signs  ED Triage Vitals   Temperature Pulse Respirations Blood Pressure SpO2   06/20/24 2058 06/20/24 2058 06/20/24 2058 06/20/24 2100 06/20/24 2058   98.6 °F (37 °C) 88 19 (!) 178/89 95 %      Temp src Heart Rate Source Patient Position - Orthostatic VS BP Location FiO2 (%)   -- 06/20/24 2058 06/20/24 2058 06/20/24 2058 --    Monitor Standing Left arm       Pain Score       06/20/24 2058       10 - Worst Possible Pain           Vitals:    06/20/24 2058 06/20/24 2100   BP:  (!) 178/89   Pulse: 88    Patient Position - Orthostatic VS: Standing          Visual Acuity      ED Medications  Medications    dexamethasone (DECADRON) injection 4 mg (4 mg Intramuscular Given 6/20/24 2126)       Diagnostic Studies  Results Reviewed       None                   No orders to display              Procedures  Procedures         ED Course                               SBIRT 22yo+      Flowsheet Row Most Recent Value   Initial Alcohol Screen: US AUDIT-C     1. How often do you have a drink containing alcohol? 0 Filed at: 06/20/2024 2100   2. How many drinks containing alcohol do you have on a typical day you are drinking?  0 Filed at: 06/20/2024 2100   3a. Male UNDER 65: How often do you have five or more drinks on one occasion? 0 Filed at: 06/20/2024 2100   3b. FEMALE Any Age, or MALE 65+: How often do you have 4 or more drinks on one occassion? 0 Filed at: 06/20/2024 2100   Audit-C Score 0 Filed at: 06/20/2024 2100   RAUDEL: How many times in the past year have you...    Used an illegal drug or used a prescription medication for non-medical reasons? Never Filed at: 06/20/2024 2100                      Medical Decision Making  54-year-old female presents emergency department complaining of lower back pain that radiates from the lumbar sacral junction laterally to the top of the sacrum and SI joint bilaterally.  Patient denies any trauma fever or IV drug use.  The patient does have a history of breast cancer and is status post double mastectomy and has had history of emboli to the lungs as well as the left eye in the past.  The patient is on Eliquis chronically.  Patient denies bowel or bladder continence issues at this time and is able to ambulate but notes that certain changes, usually abrupt movements increase her pain in her low back.  The patient was ambulating in the ER with very little pain at this time.  The patient denies sensory or motor dysfunction.  Differential diagnosis based on my evaluation is lumbar sacral arthritis, muscle spasm, ligamentous strain, or potentially bony metastasis due to breast cancer.  I have  offered to order imaging for this potential however patient would rather just try medication at this time.  Patient will be given a dose of steroid and placed on outpatient muscle relaxants and more steroids due to sulfa allergy and oral anticoagulants.  Patient to return for any acute neurologic changes or worsening pain.  Patient discharged    Risk  Prescription drug management.             Disposition  Final diagnoses:   Midline low back pain without sciatica, unspecified chronicity     Time reflects when diagnosis was documented in both MDM as applicable and the Disposition within this note       Time User Action Codes Description Comment    6/20/2024  9:30 PM Michael Woods [M54.50] Midline low back pain without sciatica, unspecified chronicity           ED Disposition       ED Disposition   Discharge    Condition   Stable    Date/Time   Thu Jun 20, 2024 2131    Comment   Elise Reyes discharge to home/self care.                   Follow-up Information    None         Discharge Medication List as of 6/20/2024  9:42 PM        START taking these medications    Details   cyclobenzaprine (FLEXERIL) 5 mg tablet Take 1 tablet (5 mg total) by mouth 3 (three) times a day as needed for muscle spasms for up to 15 doses, Starting Thu 6/20/2024, Normal      lidocaine (Lidoderm) 5 % Apply 1 patch topically over 12 hours daily Remove & Discard patch within 12 hours or as directed by MD, Starting Thu 6/20/2024, Normal      predniSONE 20 mg tablet Take 2 tablets (40 mg total) by mouth daily for 4 days Do not start before June 21, 2024., Starting Fri 6/21/2024, Until Tue 6/25/2024, Normal           CONTINUE these medications which have NOT CHANGED    Details   aflibercept (Eylea) 2 MG/0.05ML SOLN by Intravitreal route, Historical Med      anastrozole (ARIMIDEX) 1 mg tablet Starting Thu 8/27/2020, Historical Med      apixaban (ELIQUIS) 5 mg Multiple Dosages:Starting Thu 7/23/2020, Until Wed 7/29/2020 at 2359, THEN  Starting Thu 7/30/2020, Until Fri 8/21/2020 at 2359Take 2 tablets (10 mg total) by mouth 2 (two) times a day for 7 days, THEN 1 tablet (5 mg total) 2 (two) times a day for 23 d ays., Normal      Calcium Carbonate-Vit D-Min (Calcium 1200) 6412-8176 MG-UNIT CHEW Chew 1 tablet daily, Starting Thu 2/22/2024, Normal      Cholecalciferol (Vitamin D3) 50 MCG (2000 UT) TABS Take 1 tablet (2,000 Units total) by mouth daily, Starting Th 2/22/2024, Normal      denosumab (PROLIA) 60 mg/mL Inject 60 mg under the skin once 1/6/23 first inj,, Historical Med      lisinopril (ZESTRIL) 10 mg tablet Take 1 tablet (10 mg total) by mouth daily, Starting Thu 2/22/2024, Normal      simvastatin (ZOCOR) 40 mg tablet Take 1 tablet (40 mg total) by mouth daily at bedtime, Starting Thu 2/22/2024, Normal             No discharge procedures on file.    PDMP Review         Value Time User    PDMP Reviewed  Yes 2/20/2023  3:39 PM CASSIDY Begum            ED Provider  Electronically Signed by             Michael Woods Jr.,   06/21/24 1564

## 2024-09-04 ENCOUNTER — APPOINTMENT (OUTPATIENT)
Dept: LAB | Facility: HOSPITAL | Age: 54
End: 2024-09-04
Payer: COMMERCIAL

## 2024-09-04 DIAGNOSIS — Z17.0 MALIGNANT NEOPLASM OF UPPER-OUTER QUADRANT OF RIGHT BREAST IN FEMALE, ESTROGEN RECEPTOR POSITIVE (HCC): ICD-10-CM

## 2024-09-04 DIAGNOSIS — C50.411 MALIGNANT NEOPLASM OF UPPER-OUTER QUADRANT OF RIGHT BREAST IN FEMALE, ESTROGEN RECEPTOR POSITIVE (HCC): ICD-10-CM

## 2024-09-04 DIAGNOSIS — M81.6 LOCALIZED OSTEOPOROSIS WITHOUT CURRENT PATHOLOGICAL FRACTURE: ICD-10-CM

## 2024-09-04 LAB
ALBUMIN SERPL BCG-MCNC: 4.4 G/DL (ref 3.5–5)
ALP SERPL-CCNC: 42 U/L (ref 34–104)
ALT SERPL W P-5'-P-CCNC: 18 U/L (ref 7–52)
ANION GAP SERPL CALCULATED.3IONS-SCNC: 8 MMOL/L (ref 4–13)
AST SERPL W P-5'-P-CCNC: 16 U/L (ref 13–39)
BASOPHILS # BLD AUTO: 0.05 THOUSANDS/ÂΜL (ref 0–0.1)
BASOPHILS NFR BLD AUTO: 1 % (ref 0–1)
BILIRUB SERPL-MCNC: 0.56 MG/DL (ref 0.2–1)
BUN SERPL-MCNC: 13 MG/DL (ref 5–25)
CALCIUM SERPL-MCNC: 10 MG/DL (ref 8.4–10.2)
CHLORIDE SERPL-SCNC: 106 MMOL/L (ref 96–108)
CO2 SERPL-SCNC: 27 MMOL/L (ref 21–32)
CREAT SERPL-MCNC: 1.07 MG/DL (ref 0.6–1.3)
EOSINOPHIL # BLD AUTO: 0.1 THOUSAND/ÂΜL (ref 0–0.61)
EOSINOPHIL NFR BLD AUTO: 2 % (ref 0–6)
ERYTHROCYTE [DISTWIDTH] IN BLOOD BY AUTOMATED COUNT: 13 % (ref 11.6–15.1)
GFR SERPL CREATININE-BSD FRML MDRD: 58 ML/MIN/1.73SQ M
GLUCOSE P FAST SERPL-MCNC: 85 MG/DL (ref 65–99)
HCT VFR BLD AUTO: 45 % (ref 34.8–46.1)
HGB BLD-MCNC: 14.5 G/DL (ref 11.5–15.4)
IMM GRANULOCYTES # BLD AUTO: 0 THOUSAND/UL (ref 0–0.2)
IMM GRANULOCYTES NFR BLD AUTO: 0 % (ref 0–2)
LYMPHOCYTES # BLD AUTO: 1.67 THOUSANDS/ÂΜL (ref 0.6–4.47)
LYMPHOCYTES NFR BLD AUTO: 27 % (ref 14–44)
MCH RBC QN AUTO: 28.4 PG (ref 26.8–34.3)
MCHC RBC AUTO-ENTMCNC: 32.2 G/DL (ref 31.4–37.4)
MCV RBC AUTO: 88 FL (ref 82–98)
MONOCYTES # BLD AUTO: 0.53 THOUSAND/ÂΜL (ref 0.17–1.22)
MONOCYTES NFR BLD AUTO: 8 % (ref 4–12)
NEUTROPHILS # BLD AUTO: 3.93 THOUSANDS/ÂΜL (ref 1.85–7.62)
NEUTS SEG NFR BLD AUTO: 62 % (ref 43–75)
NRBC BLD AUTO-RTO: 0 /100 WBCS
PLATELET # BLD AUTO: 230 THOUSANDS/UL (ref 149–390)
PMV BLD AUTO: 9.7 FL (ref 8.9–12.7)
POTASSIUM SERPL-SCNC: 3.6 MMOL/L (ref 3.5–5.3)
PROT SERPL-MCNC: 7.3 G/DL (ref 6.4–8.4)
RBC # BLD AUTO: 5.11 MILLION/UL (ref 3.81–5.12)
SODIUM SERPL-SCNC: 141 MMOL/L (ref 135–147)
WBC # BLD AUTO: 6.28 THOUSAND/UL (ref 4.31–10.16)

## 2024-09-04 PROCEDURE — 85025 COMPLETE CBC W/AUTO DIFF WBC: CPT

## 2024-09-04 PROCEDURE — 36415 COLL VENOUS BLD VENIPUNCTURE: CPT

## 2024-09-04 PROCEDURE — 80053 COMPREHEN METABOLIC PANEL: CPT

## 2024-09-19 ENCOUNTER — TELEPHONE (OUTPATIENT)
Age: 54
End: 2024-09-19

## 2024-09-19 NOTE — TELEPHONE ENCOUNTER
Patient wants to know if she needs updated blood work for her upcoming appointment since her last labs were done in March.    Patient would like someone to call her back with an update.

## 2024-10-02 ENCOUNTER — TELEPHONE (OUTPATIENT)
Dept: NEPHROLOGY | Facility: CLINIC | Age: 54
End: 2024-10-02

## 2024-10-02 DIAGNOSIS — N02.B9 IGA NEPHROPATHY: ICD-10-CM

## 2024-10-02 DIAGNOSIS — N18.31 STAGE 3A CHRONIC KIDNEY DISEASE (HCC): Primary | ICD-10-CM

## 2024-10-07 ENCOUNTER — APPOINTMENT (OUTPATIENT)
Dept: LAB | Facility: HOSPITAL | Age: 54
End: 2024-10-07
Payer: COMMERCIAL

## 2024-10-07 DIAGNOSIS — N18.31 STAGE 3A CHRONIC KIDNEY DISEASE (HCC): ICD-10-CM

## 2024-10-07 DIAGNOSIS — N02.B9 IGA NEPHROPATHY: ICD-10-CM

## 2024-10-07 LAB
BACTERIA UR QL AUTO: ABNORMAL /HPF
BILIRUB UR QL STRIP: NEGATIVE
CLARITY UR: CLEAR
COLOR UR: YELLOW
CREAT UR-MCNC: 178.7 MG/DL
CREAT UR-MCNC: 180.3 MG/DL
GLUCOSE UR STRIP-MCNC: NEGATIVE MG/DL
HCV AB SER QL: NORMAL
HGB UR QL STRIP.AUTO: NEGATIVE
HIV 1+2 AB+HIV1 P24 AG SERPL QL IA: NORMAL
HIV 2 AB SERPL QL IA: NORMAL
HIV1 AB SERPL QL IA: NORMAL
HIV1 P24 AG SERPL QL IA: NORMAL
KETONES UR STRIP-MCNC: NEGATIVE MG/DL
LEUKOCYTE ESTERASE UR QL STRIP: NEGATIVE
MAGNESIUM SERPL-MCNC: 2 MG/DL (ref 1.9–2.7)
MICROALBUMIN UR-MCNC: 19.1 MG/L
MICROALBUMIN/CREAT 24H UR: 11 MG/G CREATININE (ref 0–30)
NITRITE UR QL STRIP: NEGATIVE
NON-SQ EPI CELLS URNS QL MICRO: ABNORMAL /HPF
PH UR STRIP.AUTO: 6 [PH]
PHOSPHATE SERPL-MCNC: 3.1 MG/DL (ref 2.7–4.5)
PROT UR STRIP-MCNC: NEGATIVE MG/DL
PROT UR-MCNC: 10.1 MG/DL
PROT/CREAT UR: 0.1 MG/G{CREAT} (ref 0–0.1)
PTH-INTACT SERPL-MCNC: 36.7 PG/ML (ref 12–88)
RBC #/AREA URNS AUTO: ABNORMAL /HPF
SP GR UR STRIP.AUTO: 1.02
UROBILINOGEN UR QL STRIP.AUTO: 0.2 E.U./DL
WBC #/AREA URNS AUTO: ABNORMAL /HPF

## 2024-10-07 PROCEDURE — 83970 ASSAY OF PARATHORMONE: CPT

## 2024-10-07 PROCEDURE — 82570 ASSAY OF URINE CREATININE: CPT

## 2024-10-07 PROCEDURE — 84100 ASSAY OF PHOSPHORUS: CPT

## 2024-10-07 PROCEDURE — 84156 ASSAY OF PROTEIN URINE: CPT

## 2024-10-07 PROCEDURE — 82043 UR ALBUMIN QUANTITATIVE: CPT

## 2024-10-07 PROCEDURE — 81001 URINALYSIS AUTO W/SCOPE: CPT

## 2024-10-07 PROCEDURE — 83735 ASSAY OF MAGNESIUM: CPT

## 2024-10-15 ENCOUNTER — OFFICE VISIT (OUTPATIENT)
Dept: NEPHROLOGY | Facility: CLINIC | Age: 54
End: 2024-10-15
Payer: COMMERCIAL

## 2024-10-15 VITALS
OXYGEN SATURATION: 99 % | HEIGHT: 63 IN | HEART RATE: 71 BPM | WEIGHT: 181 LBS | BODY MASS INDEX: 32.07 KG/M2 | TEMPERATURE: 98.7 F | DIASTOLIC BLOOD PRESSURE: 84 MMHG | SYSTOLIC BLOOD PRESSURE: 130 MMHG

## 2024-10-15 DIAGNOSIS — I10 ESSENTIAL HYPERTENSION: ICD-10-CM

## 2024-10-15 DIAGNOSIS — N25.81 SECONDARY HYPERPARATHYROIDISM OF RENAL ORIGIN (HCC): ICD-10-CM

## 2024-10-15 DIAGNOSIS — N18.31 STAGE 3A CHRONIC KIDNEY DISEASE (HCC): Primary | ICD-10-CM

## 2024-10-15 DIAGNOSIS — R60.0 EDEMA OF BOTH LOWER EXTREMITIES: ICD-10-CM

## 2024-10-15 DIAGNOSIS — N02.B9 IGA NEPHROPATHY: ICD-10-CM

## 2024-10-15 DIAGNOSIS — E83.42 HYPOMAGNESEMIA: ICD-10-CM

## 2024-10-15 DIAGNOSIS — E55.9 VITAMIN D DEFICIENCY: ICD-10-CM

## 2024-10-15 DIAGNOSIS — R80.1 PERSISTENT PROTEINURIA: ICD-10-CM

## 2024-10-15 PROCEDURE — 99214 OFFICE O/P EST MOD 30 MIN: CPT

## 2024-10-15 RX ORDER — SEMAGLUTIDE 1.7 MG/.75ML
1.7 INJECTION, SOLUTION SUBCUTANEOUS WEEKLY
COMMUNITY

## 2024-10-15 NOTE — PATIENT INSTRUCTIONS
Pleasure seeing you today.     Your kidney function is stable with a creatinine of 1.07 mg/dL and a GFR of 58 mL/min.  Please continue to avoid NSAIDs such as Advil, Motrin, Aleve, ibuprofen and naproxen.  Please continue to follow 2 g sodium restriction.    Please monitor blood pressure at doctor visits.  If blood pressure upper number is consistently less than 100 or greater than 150 please contact the office.  Please also call the office if you are experiencing increased headaches, dizziness, lightheadedness, chest pain or blurred vision.  Please continue to maintain a 2 g sodium restriction.    Please continue taking all medications as previously ordered.    Please return for follow-up appointment in 1 year to see Dr. Floyd with lab work completed prior to that visit.

## 2024-10-15 NOTE — PROGRESS NOTES
Ambulatory Visit  Name: Elise Reyes      : 1970      MRN: [id number]  Encounter Provider: CASSIDY Rowley  Encounter Date: 10/15/2024   Encounter department: [facility]    Assessment & Plan  Stage 3a chronic kidney disease (HCC)  Lab Results   Component Value Date    EGFR 58 2024    EGFR 59 2024    EGFR 49 2023    CREATININE 1.07 2024    CREATININE 1.06 2024    CREATININE 1.23 2023   Baseline creatinine 1.0-1.3 mg/dL dating to .  Etiology IgA nephropathy, s/p left renal biopsy 2018.  Creatinine 1.07 mg/dL, GFR 58 ml/min, .   UA unremarkable, 10/7. UACR 11 mg/g, UPCR 0.1, 10.7.  US with no hydronephrosis or calculi, 23.   Continue to avoid NSAIDs, nephrotoxins and hypotension.  Continue lisinopril 10 mg daily     IgA nephropathy  Biopsy-proven 2018.  Renal function stable at this time, will continue to monitor.     Essential hypertension  Blood pressure 130/84 mmHg  Home blood pressure - does not check at home.   Continue 2 g sodium restriction.  Continue lisinopril 10 mg daily.     Edema of both lower extremities  Chronic lymphedema in BLE and right arm, wears sleeves and has been following with Radha Carpenter for lymphedema management. Bilateral lower extremities examine 1+ BLE edema.   Previously taking furosemide daily, no longer taking.      Vitamin D deficiency  Vitamin D 59.6 ng/mL, 3/4/2024.  Continue cholecalciferol 2000 units daily and calcium carbonate/vitamin D 2271-1190 mg daily.       History of Present Illness     Elise Reyes is a 54 y.o. female who presents to [place] office for follow-up of stage III chronic kidney disease, IgA nephropathy, essential hypertension, BLE edema and vitamin D deficiency.  PMH includes breast cancer in 2019 s/p bilateral mastectomy, pulmonary embolism, and obesity.    Denies recent visits to the emergency department,  hospitalizations or illness.  States has been feeling well.      Review of Systems        Objective     LMP 07/27/2019     Physical Exam

## 2024-10-15 NOTE — ASSESSMENT & PLAN NOTE
Blood pressure 130/84 mmHg  Home blood pressure - does not check at home.   Continue 2 g sodium restriction.  Continue lisinopril 10 mg daily.

## 2024-10-15 NOTE — ASSESSMENT & PLAN NOTE
Lab Results   Component Value Date    EGFR 58 09/04/2024    EGFR 59 03/04/2024    EGFR 49 08/17/2023    CREATININE 1.07 09/04/2024    CREATININE 1.06 03/04/2024    CREATININE 1.23 08/17/2023   Baseline creatinine 1.0-1.3 mg/dL dating to 2019.  Etiology IgA nephropathy, s/p left renal biopsy December 2018.  Creatinine 1.07 mg/dL, GFR 58 ml/min, 9/4.   UA unremarkable, 10/7. UACR 11 mg/g, UPCR 0.1, 10.7.  US with no hydronephrosis or calculi, 7/4/23.   Continue to avoid NSAIDs, nephrotoxins and hypotension.  Continue lisinopril 10 mg daily

## 2024-10-15 NOTE — ASSESSMENT & PLAN NOTE
Chronic lymphedema in BLE and right arm, wears sleeves and has been following with Radha Carpenter for lymphedema management. Bilateral lower extremities examine 1+ BLE edema.   Previously taking furosemide daily, no longer taking.

## 2024-10-15 NOTE — ASSESSMENT & PLAN NOTE
Vitamin D 59.6 ng/mL, 3/4/2024.  Continue cholecalciferol 2000 units daily and calcium carbonate/vitamin D 7219-2859 mg daily.

## 2024-11-06 ENCOUNTER — TELEPHONE (OUTPATIENT)
Dept: NEPHROLOGY | Facility: CLINIC | Age: 54
End: 2024-11-06

## 2025-04-01 DIAGNOSIS — I10 ESSENTIAL HYPERTENSION: ICD-10-CM

## 2025-04-02 RX ORDER — LISINOPRIL 10 MG/1
10 TABLET ORAL DAILY
Qty: 90 TABLET | Refills: 0 | OUTPATIENT
Start: 2025-04-02

## 2025-04-04 DIAGNOSIS — I10 ESSENTIAL HYPERTENSION: ICD-10-CM

## 2025-04-04 RX ORDER — LISINOPRIL 10 MG/1
10 TABLET ORAL DAILY
Qty: 90 TABLET | Refills: 0 | OUTPATIENT
Start: 2025-04-04

## 2025-04-04 RX ORDER — LISINOPRIL 10 MG/1
10 TABLET ORAL DAILY
Qty: 90 TABLET | Refills: 3 | Status: SHIPPED | OUTPATIENT
Start: 2025-04-04

## 2025-04-15 ENCOUNTER — OFFICE VISIT (OUTPATIENT)
Dept: URGENT CARE | Facility: CLINIC | Age: 55
End: 2025-04-15
Payer: COMMERCIAL

## 2025-04-15 VITALS
HEART RATE: 74 BPM | BODY MASS INDEX: 28.7 KG/M2 | WEIGHT: 162 LBS | RESPIRATION RATE: 18 BRPM | SYSTOLIC BLOOD PRESSURE: 133 MMHG | TEMPERATURE: 98.4 F | OXYGEN SATURATION: 100 % | DIASTOLIC BLOOD PRESSURE: 61 MMHG

## 2025-04-15 DIAGNOSIS — R30.0 DYSURIA: Primary | ICD-10-CM

## 2025-04-15 LAB
SL AMB  POCT GLUCOSE, UA: ABNORMAL
SL AMB LEUKOCYTE ESTERASE,UA: ABNORMAL
SL AMB POCT BILIRUBIN,UA: ABNORMAL
SL AMB POCT BLOOD,UA: ABNORMAL
SL AMB POCT CLARITY,UA: ABNORMAL
SL AMB POCT COLOR,UA: ABNORMAL
SL AMB POCT KETONES,UA: ABNORMAL
SL AMB POCT NITRITE,UA: ABNORMAL
SL AMB POCT PH,UA: 6.5
SL AMB POCT SPECIFIC GRAVITY,UA: 1.01
SL AMB POCT URINE PROTEIN: 100
SL AMB POCT UROBILINOGEN: 0.2

## 2025-04-15 PROCEDURE — 99213 OFFICE O/P EST LOW 20 MIN: CPT | Performed by: NURSE PRACTITIONER

## 2025-04-15 PROCEDURE — 87086 URINE CULTURE/COLONY COUNT: CPT | Performed by: NURSE PRACTITIONER

## 2025-04-15 PROCEDURE — 81002 URINALYSIS NONAUTO W/O SCOPE: CPT | Performed by: NURSE PRACTITIONER

## 2025-04-15 RX ORDER — NITROFURANTOIN 25; 75 MG/1; MG/1
100 CAPSULE ORAL 2 TIMES DAILY
Qty: 10 CAPSULE | Refills: 0 | Status: SHIPPED | OUTPATIENT
Start: 2025-04-15 | End: 2025-04-20

## 2025-04-15 NOTE — PROGRESS NOTES
[facility]        NAME: Elise Reyes is a 54 y.o. female  : 1970    MRN: [id number]  DATE: April 15, 2025  TIME: 10:34 AM    Assessment and Plan   Dysuria [R30.0]  1. Dysuria  POCT urine dip    Urine culture    nitrofurantoin (MACROBID) 100 mg capsule        Ua pos blood and leuks -   Will start course of macrobid   Instructions provided   F/u with pcp   Pt in agreement with plan     Patient Instructions     Follow up with PCP in 3-5 days.  Proceed to  ER if symptoms worsen.    Chief Complaint     Chief Complaint   Patient presents with    Possible UTI         History of Present Illness   Elise Reyes presents to the clinic c/o    Pt states symptoms started this morning when she got to work - urinary urgency, burning, frequency     No h/o utis in the past -   Daughter with history so she is aware of symptoms   Noted some blood with wiping just prior to coming to office for appt.  Had some lbp for the past few days but didn't think anything of it         Review of Systems   Review of Systems   All other systems reviewed and are negative.        Current Medications     Long-Term Medications   Medication Sig Dispense Refill    Calcium Carbonate-Vit D-Min (Calcium 1200) [id number] MG-UNIT CHEW Chew 1 tablet daily 90 tablet 3    Cholecalciferol (Vitamin D3) 50 MCG (2000 UT) TABS Take 1 tablet (2,000 Units total) by mouth daily 90 tablet 3    denosumab (PROLIA) 60 mg/mL Inject 60 mg under the skin once 23 first inj,      lisinopril (ZESTRIL) 10 mg tablet Take 1 tablet (10 mg total) by mouth daily 90 tablet 3    simvastatin (ZOCOR) 40 mg tablet Take 1 tablet (40 mg total) by mouth daily at bedtime 90 tablet 3       Current Allergies     Allergies as of 04/15/2025 - Reviewed 04/15/2025   Allergen Reaction Noted    Bactrim [sulfamethoxazole-trimethoprim]  2019    Sulfasalazine Rash 2013            The following portions of the patient's history were reviewed and updated as  appropriate: allergies, current medications, past family history, past medical history, past social history, past surgical history and problem list.    Objective   /61   Pulse 74   Temp 98.4 °F (36.9 °C)   Resp 18   Wt 73.5 kg (162 lb)   LMP 07/27/2019   SpO2 100%   BMI 28.70 kg/m²        Physical Exam     Physical Exam  Vitals and nursing note reviewed.   Constitutional:       Appearance: Normal appearance. She is well-developed.   HENT:      Head: Normocephalic and atraumatic.      Right Ear: Hearing normal.      Left Ear: Hearing normal.      Nose: Nose normal.      Mouth/Throat:      Lips: Pink.      Mouth: Mucous membranes are moist.      Pharynx: Oropharynx is clear.   Eyes:      General: Lids are normal.      Extraocular Movements: Extraocular movements intact.      Conjunctiva/sclera: Conjunctivae normal.      Pupils: Pupils are equal, round, and reactive to light.   Cardiovascular:      Rate and Rhythm: Normal rate and regular rhythm.      Pulses: Normal pulses.      Heart sounds: Normal heart sounds, S1 normal and S2 normal.   Pulmonary:      Effort: Pulmonary effort is normal.      Breath sounds: Normal breath sounds.   Abdominal:      General: Abdomen is flat. Bowel sounds are normal.      Palpations: Abdomen is soft.      Tenderness: There is no abdominal tenderness. There is no right CVA tenderness or left CVA tenderness.   Musculoskeletal:         General: Normal range of motion.      Cervical back: Full passive range of motion without pain, normal range of motion and neck supple.   Skin:     General: Skin is warm and dry.   Neurological:      General: No focal deficit present.      Mental Status: She is alert and oriented to person, place, and time.   Psychiatric:         Mood and Affect: Mood normal.         Speech: Speech normal.         Behavior: Behavior normal. Behavior is cooperative.         Thought Content: Thought content normal.         Judgment: Judgment normal.

## 2025-04-15 NOTE — PATIENT INSTRUCTIONS
"Patient Education     Urinary tract infection - Discharge instructions   The Basics   Written by the doctors and editors at [facility]   What are discharge instructions? -- Discharge instructions are information about how to take care of yourself after getting medical care for a health problem.  What is a urinary tract infection? -- A urinary tract infection (\"UTI\") is an infection that affects either the bladder or the kidneys (figure 1). A kidney infection is more serious, and can lead to other serious problems if it is not treated properly.  You need to take antibiotics to treat a UTI. It is important to take all of your antibiotics, even if you start to feel better.  How do I care for myself at home? -- Ask the doctor or nurse what you should do when you go home. Make sure that you understand exactly what you need to do to care for yourself. Ask questions if there is anything you do not understand.  You should also:   Take all of your medicines as instructed.   Take phenazopyridine (sample brand name: AZO Urinary Pain Relief) for the first day or so, if you choose. This is an over-the-counter medicine. It will help numb your bladder and decrease the urge to urinate. This medicine causes your urine and tears to look orange.   Take acetaminophen (sample brand name: Tylenol) if needed for pain.   Drink extra fluids. This can help prevent more bladder infections. If you have sex, these things might also help:   Urinate right afterward.   If you use birth control, use a form that does not contain spermicide.  When should I call the doctor? -- Call for advice if:   You have pain in your back, shoulder, or belly.   You have a fever, shaking chills, or sweats even though you are taking antibiotics.   You notice more blood in your urine.   Your symptoms get worse or do not get better within 24 hours of starting antibiotics.   Your symptoms come back after finishing treatment.   You have any new or worrying symptoms.  All " topics are updated as new evidence becomes available and our peer review process is complete.  This topic retrieved from [employer] on: Feb 26, 2024.  Topic 361701 Version 1.0  Release: 32.2.4 - C32.56  © 2024 Up[employer], [employer] and/or its affiliates. All rights reserved.  figure 1: Anatomy of the urinary tract     Urine is made by the kidneys. It passes from the kidneys into the bladder through 2 tubes called the ureters. Then, it leaves the bladder through another tube called the urethra.  Graphic 11084 Version 8.0  Consumer Information Use and Disclaimer   Disclaimer: This generalized information is a limited summary of diagnosis, treatment, and/or medication information. It is not meant to be comprehensive and should be used as a tool to help the user understand and/or assess potential diagnostic and treatment options. It does NOT include all information about conditions, treatments, medications, side effects, or risks that may apply to a specific patient. It is not intended to be medical advice or a substitute for the medical advice, diagnosis, or treatment of a health care provider based on the health care provider's examination and assessment of a patient's specific and unique circumstances. Patients must speak with a health care provider for complete information about their health, medical questions, and treatment options, including any risks or benefits regarding use of medications. This information does not endorse any treatments or medications as safe, effective, or approved for treating a specific patient. Up[employer], Inc[employer] and its affiliates disclaim any warranty or liability relating to this information or the use thereof.The use of this information is governed by the Terms of Use, available at https://www.wolterskluwer.com/en/know/clinical-effectiveness-terms. 2024© Up[employer]Date, Inc. and its affiliates and/or licensors. All rights reserved.  Copyright   © 2024 [employer], [employer] and/or its affiliates. All rights  reserved.

## 2025-04-17 LAB — BACTERIA UR CULT: NORMAL
